# Patient Record
Sex: FEMALE | Race: OTHER | HISPANIC OR LATINO
[De-identification: names, ages, dates, MRNs, and addresses within clinical notes are randomized per-mention and may not be internally consistent; named-entity substitution may affect disease eponyms.]

---

## 2017-01-10 ENCOUNTER — APPOINTMENT (OUTPATIENT)
Dept: UROGYNECOLOGY | Facility: CLINIC | Age: 67
End: 2017-01-10

## 2017-01-10 VITALS
DIASTOLIC BLOOD PRESSURE: 70 MMHG | SYSTOLIC BLOOD PRESSURE: 120 MMHG | WEIGHT: 131 LBS | BODY MASS INDEX: 24.73 KG/M2 | HEIGHT: 61 IN

## 2017-01-10 LAB
BILIRUB UR QL STRIP: NEGATIVE
CLARITY UR: CLEAR
COLLECTION METHOD: NORMAL
GLUCOSE UR-MCNC: NEGATIVE
HCG UR QL: 0.2 EU/DL
HGB UR QL STRIP.AUTO: NORMAL
KETONES UR-MCNC: NEGATIVE
LEUKOCYTE ESTERASE UR QL STRIP: NEGATIVE
NITRITE UR QL STRIP: NEGATIVE
PH UR STRIP: 5.5
PROT UR STRIP-MCNC: NEGATIVE
SP GR UR STRIP: 1

## 2017-01-11 LAB
APPEARANCE: CLEAR
BACTERIA: NEGATIVE
BILIRUBIN URINE: NEGATIVE
BLOOD URINE: ABNORMAL
COLOR: YELLOW
GLUCOSE QUALITATIVE U: NORMAL MG/DL
KETONES URINE: NEGATIVE
LEUKOCYTE ESTERASE URINE: NEGATIVE
MICROSCOPIC-UA: NORMAL
NITRITE URINE: NEGATIVE
PH URINE: 6
PROTEIN URINE: NEGATIVE MG/DL
RED BLOOD CELLS URINE: 0 /HPF
SPECIFIC GRAVITY URINE: 1.01
SQUAMOUS EPITHELIAL CELLS: 0 /HPF
UROBILINOGEN URINE: NORMAL MG/DL
WHITE BLOOD CELLS URINE: 0 /HPF

## 2017-04-06 ENCOUNTER — RESULT CHARGE (OUTPATIENT)
Age: 67
End: 2017-04-06

## 2017-04-07 ENCOUNTER — LABORATORY RESULT (OUTPATIENT)
Age: 67
End: 2017-04-07

## 2017-04-07 ENCOUNTER — APPOINTMENT (OUTPATIENT)
Dept: INTERNAL MEDICINE | Facility: CLINIC | Age: 67
End: 2017-04-07

## 2017-04-07 ENCOUNTER — NON-APPOINTMENT (OUTPATIENT)
Age: 67
End: 2017-04-07

## 2017-04-07 ENCOUNTER — OUTPATIENT (OUTPATIENT)
Dept: OUTPATIENT SERVICES | Facility: HOSPITAL | Age: 67
LOS: 1 days | End: 2017-04-07
Payer: MEDICAID

## 2017-04-07 VITALS
DIASTOLIC BLOOD PRESSURE: 90 MMHG | HEART RATE: 76 BPM | SYSTOLIC BLOOD PRESSURE: 120 MMHG | OXYGEN SATURATION: 98 % | WEIGHT: 134 LBS | HEIGHT: 61 IN | BODY MASS INDEX: 25.3 KG/M2

## 2017-04-07 DIAGNOSIS — R00.2 PALPITATIONS: ICD-10-CM

## 2017-04-07 DIAGNOSIS — H26.9 UNSPECIFIED CATARACT: Chronic | ICD-10-CM

## 2017-04-07 DIAGNOSIS — N95.2 POSTMENOPAUSAL ATROPHIC VAGINITIS: ICD-10-CM

## 2017-04-07 DIAGNOSIS — I10 ESSENTIAL (PRIMARY) HYPERTENSION: ICD-10-CM

## 2017-04-07 DIAGNOSIS — L71.9 ROSACEA, UNSPECIFIED: ICD-10-CM

## 2017-04-07 DIAGNOSIS — H02.209 UNSPECIFIED LAGOPHTHALMOS UNSPECIFIED EYE, UNSPECIFIED EYELID: Chronic | ICD-10-CM

## 2017-04-07 LAB — TSH SERPL-MCNC: 0.96 UIU/ML — SIGNIFICANT CHANGE UP (ref 0.27–4.2)

## 2017-04-07 PROCEDURE — 86803 HEPATITIS C AB TEST: CPT

## 2017-04-07 PROCEDURE — 80053 COMPREHEN METABOLIC PANEL: CPT

## 2017-04-07 PROCEDURE — 83036 HEMOGLOBIN GLYCOSYLATED A1C: CPT

## 2017-04-07 PROCEDURE — G0463: CPT

## 2017-04-07 PROCEDURE — 84443 ASSAY THYROID STIM HORMONE: CPT

## 2017-04-08 LAB
ALBUMIN SERPL ELPH-MCNC: 4.4 G/DL — SIGNIFICANT CHANGE UP (ref 3.3–5)
ALP SERPL-CCNC: 90 U/L — SIGNIFICANT CHANGE UP (ref 40–120)
ALT FLD-CCNC: 9 U/L — LOW (ref 10–45)
ANION GAP SERPL CALC-SCNC: 14 MMOL/L — SIGNIFICANT CHANGE UP (ref 5–17)
AST SERPL-CCNC: 24 U/L — SIGNIFICANT CHANGE UP (ref 10–40)
BILIRUB SERPL-MCNC: 0.3 MG/DL — SIGNIFICANT CHANGE UP (ref 0.2–1.2)
BUN SERPL-MCNC: 15 MG/DL — SIGNIFICANT CHANGE UP (ref 7–23)
CALCIUM SERPL-MCNC: 9.8 MG/DL — SIGNIFICANT CHANGE UP (ref 8.4–10.5)
CHLORIDE SERPL-SCNC: 99 MMOL/L — SIGNIFICANT CHANGE UP (ref 96–108)
CO2 SERPL-SCNC: 28 MMOL/L — SIGNIFICANT CHANGE UP (ref 22–31)
CREAT SERPL-MCNC: 0.93 MG/DL — SIGNIFICANT CHANGE UP (ref 0.5–1.3)
GLUCOSE SERPL-MCNC: 91 MG/DL — SIGNIFICANT CHANGE UP (ref 70–99)
HBA1C BLD-MCNC: 5.9 % — HIGH (ref 4–5.6)
HCV AB S/CO SERPL IA: 0.1 S/CO — SIGNIFICANT CHANGE UP
HCV AB SERPL-IMP: SIGNIFICANT CHANGE UP
POTASSIUM SERPL-MCNC: 4.8 MMOL/L — SIGNIFICANT CHANGE UP (ref 3.5–5.3)
POTASSIUM SERPL-SCNC: 4.8 MMOL/L — SIGNIFICANT CHANGE UP (ref 3.5–5.3)
PROT SERPL-MCNC: 8.2 G/DL — SIGNIFICANT CHANGE UP (ref 6–8.3)
SODIUM SERPL-SCNC: 141 MMOL/L — SIGNIFICANT CHANGE UP (ref 135–145)

## 2017-04-12 ENCOUNTER — APPOINTMENT (OUTPATIENT)
Dept: INTERNAL MEDICINE | Facility: CLINIC | Age: 67
End: 2017-04-12

## 2017-04-17 DIAGNOSIS — R73.03 PREDIABETES: ICD-10-CM

## 2017-04-17 DIAGNOSIS — R00.2 PALPITATIONS: ICD-10-CM

## 2017-04-17 DIAGNOSIS — L71.9 ROSACEA, UNSPECIFIED: ICD-10-CM

## 2017-04-17 DIAGNOSIS — E87.6 HYPOKALEMIA: ICD-10-CM

## 2017-04-17 DIAGNOSIS — L53.9 ERYTHEMATOUS CONDITION, UNSPECIFIED: ICD-10-CM

## 2017-04-18 ENCOUNTER — APPOINTMENT (OUTPATIENT)
Dept: INTERNAL MEDICINE | Facility: CLINIC | Age: 67
End: 2017-04-18

## 2017-04-18 ENCOUNTER — NON-APPOINTMENT (OUTPATIENT)
Age: 67
End: 2017-04-18

## 2017-04-25 ENCOUNTER — FORM ENCOUNTER (OUTPATIENT)
Age: 67
End: 2017-04-25

## 2017-04-26 ENCOUNTER — APPOINTMENT (OUTPATIENT)
Dept: MAMMOGRAPHY | Facility: IMAGING CENTER | Age: 67
End: 2017-04-26

## 2017-04-26 ENCOUNTER — OUTPATIENT (OUTPATIENT)
Dept: OUTPATIENT SERVICES | Facility: HOSPITAL | Age: 67
LOS: 1 days | End: 2017-04-26
Payer: MEDICAID

## 2017-04-26 DIAGNOSIS — H26.9 UNSPECIFIED CATARACT: Chronic | ICD-10-CM

## 2017-04-26 DIAGNOSIS — Z00.8 ENCOUNTER FOR OTHER GENERAL EXAMINATION: ICD-10-CM

## 2017-04-26 DIAGNOSIS — H02.209 UNSPECIFIED LAGOPHTHALMOS UNSPECIFIED EYE, UNSPECIFIED EYELID: Chronic | ICD-10-CM

## 2017-04-26 PROCEDURE — 77067 SCR MAMMO BI INCL CAD: CPT

## 2017-04-26 PROCEDURE — 77063 BREAST TOMOSYNTHESIS BI: CPT

## 2017-05-02 ENCOUNTER — LABORATORY RESULT (OUTPATIENT)
Age: 67
End: 2017-05-02

## 2017-05-02 ENCOUNTER — APPOINTMENT (OUTPATIENT)
Dept: OBGYN | Facility: CLINIC | Age: 67
End: 2017-05-02

## 2017-05-02 ENCOUNTER — RESULT REVIEW (OUTPATIENT)
Age: 67
End: 2017-05-02

## 2017-05-02 ENCOUNTER — OUTPATIENT (OUTPATIENT)
Dept: OUTPATIENT SERVICES | Facility: HOSPITAL | Age: 67
LOS: 1 days | End: 2017-05-02
Payer: MEDICARE

## 2017-05-02 VITALS
BODY MASS INDEX: 24.4 KG/M2 | WEIGHT: 129.25 LBS | HEIGHT: 61 IN | DIASTOLIC BLOOD PRESSURE: 82 MMHG | SYSTOLIC BLOOD PRESSURE: 120 MMHG

## 2017-05-02 DIAGNOSIS — H26.9 UNSPECIFIED CATARACT: Chronic | ICD-10-CM

## 2017-05-02 DIAGNOSIS — H02.209 UNSPECIFIED LAGOPHTHALMOS UNSPECIFIED EYE, UNSPECIFIED EYELID: Chronic | ICD-10-CM

## 2017-05-02 DIAGNOSIS — N76.0 ACUTE VAGINITIS: ICD-10-CM

## 2017-05-02 DIAGNOSIS — N95.2 POSTMENOPAUSAL ATROPHIC VAGINITIS: ICD-10-CM

## 2017-05-02 PROCEDURE — 81003 URINALYSIS AUTO W/O SCOPE: CPT

## 2017-05-02 PROCEDURE — 88175 CYTOPATH C/V AUTO FLUID REDO: CPT

## 2017-05-02 PROCEDURE — 87086 URINE CULTURE/COLONY COUNT: CPT

## 2017-05-02 PROCEDURE — G0439: CPT

## 2017-05-02 PROCEDURE — 87624 HPV HI-RISK TYP POOLED RSLT: CPT

## 2017-05-03 ENCOUNTER — LABORATORY RESULT (OUTPATIENT)
Age: 67
End: 2017-05-03

## 2017-05-03 LAB
C TRACH RRNA SPEC QL NAA+PROBE: SIGNIFICANT CHANGE UP
HPV HIGH+LOW RISK DNA PNL CVX: SIGNIFICANT CHANGE UP
N GONORRHOEA RRNA SPEC QL NAA+PROBE: SIGNIFICANT CHANGE UP
SPECIMEN SOURCE: SIGNIFICANT CHANGE UP

## 2017-05-04 DIAGNOSIS — Z01.419 ENCOUNTER FOR GYNECOLOGICAL EXAMINATION (GENERAL) (ROUTINE) WITHOUT ABNORMAL FINDINGS: ICD-10-CM

## 2017-05-04 DIAGNOSIS — N95.2 POSTMENOPAUSAL ATROPHIC VAGINITIS: ICD-10-CM

## 2017-05-04 DIAGNOSIS — L29.0 PRURITUS ANI: ICD-10-CM

## 2017-05-04 LAB
CULTURE RESULTS: SIGNIFICANT CHANGE UP
CYTOLOGY SPEC DOC CYTO: SIGNIFICANT CHANGE UP
SPECIMEN SOURCE: SIGNIFICANT CHANGE UP

## 2017-05-12 ENCOUNTER — APPOINTMENT (OUTPATIENT)
Dept: OPHTHALMOLOGY | Facility: CLINIC | Age: 67
End: 2017-05-12

## 2017-06-02 ENCOUNTER — LABORATORY RESULT (OUTPATIENT)
Age: 67
End: 2017-06-02

## 2017-06-02 ENCOUNTER — APPOINTMENT (OUTPATIENT)
Dept: PEDIATRIC ALLERGY IMMUNOLOGY | Facility: CLINIC | Age: 67
End: 2017-06-02

## 2017-06-02 ENCOUNTER — OUTPATIENT (OUTPATIENT)
Dept: OUTPATIENT SERVICES | Facility: HOSPITAL | Age: 67
LOS: 1 days | End: 2017-06-02
Payer: MEDICARE

## 2017-06-02 VITALS
WEIGHT: 128.99 LBS | HEART RATE: 70 BPM | DIASTOLIC BLOOD PRESSURE: 82 MMHG | OXYGEN SATURATION: 98 % | SYSTOLIC BLOOD PRESSURE: 129 MMHG | HEIGHT: 60.98 IN | BODY MASS INDEX: 24.35 KG/M2

## 2017-06-02 DIAGNOSIS — Z91.041 RADIOGRAPHIC DYE ALLERGY STATUS: ICD-10-CM

## 2017-06-02 DIAGNOSIS — H26.9 UNSPECIFIED CATARACT: Chronic | ICD-10-CM

## 2017-06-02 DIAGNOSIS — J30.9 ALLERGIC RHINITIS, UNSPECIFIED: ICD-10-CM

## 2017-06-02 DIAGNOSIS — H02.209 UNSPECIFIED LAGOPHTHALMOS UNSPECIFIED EYE, UNSPECIFIED EYELID: Chronic | ICD-10-CM

## 2017-06-02 DIAGNOSIS — Z91.040 LATEX ALLERGY STATUS: ICD-10-CM

## 2017-06-02 PROCEDURE — 36415 COLL VENOUS BLD VENIPUNCTURE: CPT

## 2017-06-02 PROCEDURE — G0463: CPT | Mod: 25

## 2017-06-02 PROCEDURE — 95004 PERQ TESTS W/ALRGNC XTRCS: CPT

## 2017-06-02 RX ORDER — NYSTATIN AND TRIAMCINOLONE ACETONIDE 100000; 1 [USP'U]/G; MG/G
100000-0.1 OINTMENT TOPICAL 3 TIMES DAILY
Qty: 1 | Refills: 0 | Status: DISCONTINUED | COMMUNITY
Start: 2017-05-02 | End: 2017-06-02

## 2017-06-07 LAB
5OH-INDOLEACETATE 24H UR-MRATE: 0.2 MG/24 H
5OH-INDOLEACETATE UR-MCNC: 0.04 G/24 H
ALBUMIN SERPL ELPH-MCNC: 4.3 G/DL
ALP BLD-CCNC: 90 U/L
ALT SERPL-CCNC: 12 U/L
ANION GAP SERPL CALC-SCNC: 14 MMOL/L
AST SERPL-CCNC: 20 U/L
BASOPHILS # BLD AUTO: 0.03 K/UL
BASOPHILS NFR BLD AUTO: 0.5 %
BILIRUB SERPL-MCNC: 0.3 MG/DL
BUN SERPL-MCNC: 12 MG/DL
CALCIUM SERPL-MCNC: 9.5 MG/DL
CHLORIDE SERPL-SCNC: 102 MMOL/L
CO2 SERPL-SCNC: 23 MMOL/L
CREAT ?TM UR-MCNC: 40.5 MG/DL
CREAT SERPL-MCNC: 0.71 MG/DL
EOSINOPHIL # BLD AUTO: 0.12 K/UL
EOSINOPHIL NFR BLD AUTO: 1.9 %
GLUCOSE SERPL-MCNC: 90 MG/DL
HCT VFR BLD CALC: 38.6 %
HGB BLD-MCNC: 12 G/DL
IMM GRANULOCYTES NFR BLD AUTO: 0.2 %
LYMPHOCYTES # BLD AUTO: 2.01 K/UL
LYMPHOCYTES NFR BLD AUTO: 31.6 %
MAN DIFF?: NORMAL
MCHC RBC-ENTMCNC: 29.5 PG
MCHC RBC-ENTMCNC: 31.1 GM/DL
MCV RBC AUTO: 94.8 FL
MONOCYTES # BLD AUTO: 0.36 K/UL
MONOCYTES NFR BLD AUTO: 5.7 %
NEUTROPHILS # BLD AUTO: 3.83 K/UL
NEUTROPHILS NFR BLD AUTO: 60.1 %
PLATELET # BLD AUTO: 298 K/UL
POTASSIUM SERPL-SCNC: 4.4 MMOL/L
PROT SERPL-MCNC: 7.8 G/DL
RBC # BLD: 4.07 M/UL
RBC # FLD: 14 %
SODIUM SERPL-SCNC: 139 MMOL/L
SPECIMEN VOL 24H UR: 99 ML
TRYPTASE: 4.7 NG/ML
VMA/G CREATININE: 4.9 MG/G CREAT
WBC # FLD AUTO: 6.36 K/UL

## 2017-06-08 DIAGNOSIS — Z91.040 LATEX ALLERGY STATUS: ICD-10-CM

## 2017-06-08 DIAGNOSIS — R23.2 FLUSHING: ICD-10-CM

## 2017-06-08 DIAGNOSIS — Z01.89 ENCOUNTER FOR OTHER SPECIFIED SPECIAL EXAMINATIONS: ICD-10-CM

## 2017-06-08 DIAGNOSIS — T78.1XXA OTHER ADVERSE FOOD REACTIONS, NOT ELSEWHERE CLASSIFIED, INITIAL ENCOUNTER: ICD-10-CM

## 2017-06-08 DIAGNOSIS — H10.13 ACUTE ATOPIC CONJUNCTIVITIS, BILATERAL: ICD-10-CM

## 2017-06-08 DIAGNOSIS — Z91.013 ALLERGY TO SEAFOOD: ICD-10-CM

## 2017-06-09 ENCOUNTER — LABORATORY RESULT (OUTPATIENT)
Age: 67
End: 2017-06-09

## 2017-06-16 LAB
5OH-INDOLEACETATE 24H UR-MRATE: 3.9 MG/24 H
5OH-INDOLEACETATE UR-MCNC: 1.22 G/24 H
BLUE MUSSEL IGE QN: <0.1 KUA/L
CLAM IGE QN: <0.1 KUA/L
CODFISH IGE QN: <0.1 KUA/L
CRAB IGE QN: <0.1 KUA/L
DEPRECATED BLUE MUSSEL IGE RAST QL: 0
DEPRECATED CLAM IGE RAST QL: 0
DEPRECATED CODFISH IGE RAST QL: 0
DEPRECATED CRAB IGE RAST QL: 0
DEPRECATED FLOUNDER IGE RAST QL: 0
DEPRECATED HALIBUT IGE RAST QL: 0
DEPRECATED LOBSTER IGE RAST QL: 0
DEPRECATED LTX IGE RAST QL: 0
DEPRECATED OYSTER IGE RAST QL: 0
DEPRECATED SALMON IGE RAST QL: 0
DEPRECATED SCALLOP IGE RAST QL: <0.1 KUA/L
DEPRECATED SHRIMP IGE RAST QL: 0
DEPRECATED TILAPIA IGE RAST QL: 0
DEPRECATED TUNA IGE RAST QL: 0
DOPAMINE UR-MCNC: 79 UG/L
DOPAMINE, UR, 24HR: 221 UG/24 HR
EPINEPH UR-MCNC: 1 UG/L
EPINEPHRINE, U, 24HR: 3 UG/24 HR
FLOUNDER IGE QN: <0.1 KUA/L
HALIBUT IGE QN: <0.1 KUA/L
HISTAMINE 24H UR-MRATE: 0.02 MG/24 H
HISTAMINE UR-MCNC: 1.26 G/24 H
LOBSTER IGE QN: <0.1 KUA/L
LTX IGE QN: <0.1 KUA/L
MISCELLANEOUS TEST: NORMAL
NOREPINEPH UR-MCNC: 20 UG/L
NOREPINEPHRINE,U,24H: 56 UG/24 HR
OYSTER IGE QN: <0.1 KUA/L
PROC NAME: NORMAL
SALMON IGE QN: <0.1 KUA/L
SCALLOP IGE QN: 0
SCALLOP IGE QN: <0.1 KUA/L
SPECIMEN VOL 24H UR: 2800 ML
SPECIMEN VOL 24H UR: 2800 ML
TILAPIA IGE QN: <0.1 KUA/L
TUNA IGE QN: <0.1 KUA/L

## 2017-06-23 ENCOUNTER — APPOINTMENT (OUTPATIENT)
Dept: OPHTHALMOLOGY | Facility: CLINIC | Age: 67
End: 2017-06-23

## 2017-06-23 DIAGNOSIS — H35.3190 NONEXUDATIVE AGE-RELATED MACULAR DEGENERATION, UNSPECIFIED EYE, STAGE UNSPECIFIED: ICD-10-CM

## 2017-06-23 LAB
MISCELLANEOUS TEST: NORMAL
MISCELLANEOUS TEST: NORMAL
PROC NAME: NORMAL
PROC NAME: NORMAL

## 2017-06-29 ENCOUNTER — APPOINTMENT (OUTPATIENT)
Dept: ORTHOPEDIC SURGERY | Facility: CLINIC | Age: 67
End: 2017-06-29

## 2017-06-29 DIAGNOSIS — M25.562 PAIN IN LEFT KNEE: ICD-10-CM

## 2017-07-05 ENCOUNTER — OUTPATIENT (OUTPATIENT)
Dept: OUTPATIENT SERVICES | Facility: HOSPITAL | Age: 67
LOS: 1 days | End: 2017-07-05
Payer: MEDICARE

## 2017-07-05 ENCOUNTER — APPOINTMENT (OUTPATIENT)
Dept: ULTRASOUND IMAGING | Facility: CLINIC | Age: 67
End: 2017-07-05

## 2017-07-05 DIAGNOSIS — H02.209 UNSPECIFIED LAGOPHTHALMOS UNSPECIFIED EYE, UNSPECIFIED EYELID: Chronic | ICD-10-CM

## 2017-07-05 DIAGNOSIS — H26.9 UNSPECIFIED CATARACT: Chronic | ICD-10-CM

## 2017-07-05 DIAGNOSIS — Z00.8 ENCOUNTER FOR OTHER GENERAL EXAMINATION: ICD-10-CM

## 2017-07-05 PROCEDURE — 20611 DRAIN/INJ JOINT/BURSA W/US: CPT

## 2017-07-28 ENCOUNTER — OUTPATIENT (OUTPATIENT)
Dept: OUTPATIENT SERVICES | Facility: HOSPITAL | Age: 67
LOS: 1 days | End: 2017-07-28
Payer: MEDICARE

## 2017-07-28 ENCOUNTER — APPOINTMENT (OUTPATIENT)
Dept: PEDIATRIC ALLERGY IMMUNOLOGY | Facility: CLINIC | Age: 67
End: 2017-07-28

## 2017-07-28 VITALS
DIASTOLIC BLOOD PRESSURE: 76 MMHG | HEIGHT: 60.98 IN | BODY MASS INDEX: 24.54 KG/M2 | WEIGHT: 129.98 LBS | OXYGEN SATURATION: 98 % | SYSTOLIC BLOOD PRESSURE: 124 MMHG | HEART RATE: 74 BPM

## 2017-07-28 DIAGNOSIS — H26.9 UNSPECIFIED CATARACT: Chronic | ICD-10-CM

## 2017-07-28 DIAGNOSIS — H02.209 UNSPECIFIED LAGOPHTHALMOS UNSPECIFIED EYE, UNSPECIFIED EYELID: Chronic | ICD-10-CM

## 2017-07-28 PROCEDURE — G0463: CPT

## 2017-07-28 PROCEDURE — 99211 OFF/OP EST MAY X REQ PHY/QHP: CPT | Mod: NC

## 2017-07-28 RX ORDER — FEXOFENADINE HYDROCHLORIDE 180 MG/1
180 TABLET ORAL
Qty: 1 | Refills: 3 | Status: DISCONTINUED | COMMUNITY
Start: 2017-06-02 | End: 2017-07-28

## 2017-08-03 DIAGNOSIS — Z91.013 ALLERGY TO SEAFOOD: ICD-10-CM

## 2017-08-04 ENCOUNTER — OUTPATIENT (OUTPATIENT)
Dept: OUTPATIENT SERVICES | Facility: HOSPITAL | Age: 67
LOS: 1 days | End: 2017-08-04
Payer: MEDICARE

## 2017-08-04 ENCOUNTER — APPOINTMENT (OUTPATIENT)
Dept: PEDIATRIC ALLERGY IMMUNOLOGY | Facility: CLINIC | Age: 67
End: 2017-08-04
Payer: MEDICARE

## 2017-08-04 VITALS
SYSTOLIC BLOOD PRESSURE: 120 MMHG | DIASTOLIC BLOOD PRESSURE: 78 MMHG | HEART RATE: 74 BPM | BODY MASS INDEX: 24.54 KG/M2 | WEIGHT: 129.98 LBS | OXYGEN SATURATION: 98 % | HEIGHT: 60.98 IN

## 2017-08-04 DIAGNOSIS — R23.2 FLUSHING: ICD-10-CM

## 2017-08-04 DIAGNOSIS — H26.9 UNSPECIFIED CATARACT: Chronic | ICD-10-CM

## 2017-08-04 DIAGNOSIS — J30.9 ALLERGIC RHINITIS, UNSPECIFIED: ICD-10-CM

## 2017-08-04 DIAGNOSIS — H02.209 UNSPECIFIED LAGOPHTHALMOS UNSPECIFIED EYE, UNSPECIFIED EYELID: Chronic | ICD-10-CM

## 2017-08-04 PROCEDURE — 95076 INGEST CHALLENGE INI 120 MIN: CPT | Mod: GC,NC

## 2017-08-04 PROCEDURE — 95079 INGEST CHALLENGE ADDL 60 MIN: CPT

## 2017-08-04 PROCEDURE — 95079 INGEST CHALLENGE ADDL 60 MIN: CPT | Mod: GC,NC

## 2017-08-04 PROCEDURE — 95076 INGEST CHALLENGE INI 120 MIN: CPT

## 2017-08-07 DIAGNOSIS — T78.1XXD OTHER ADVERSE FOOD REACTIONS, NOT ELSEWHERE CLASSIFIED, SUBSEQUENT ENCOUNTER: ICD-10-CM

## 2017-08-07 DIAGNOSIS — Z91.013 ALLERGY TO SEAFOOD: ICD-10-CM

## 2017-08-25 ENCOUNTER — APPOINTMENT (OUTPATIENT)
Dept: PEDIATRIC ALLERGY IMMUNOLOGY | Facility: CLINIC | Age: 67
End: 2017-08-25
Payer: MEDICARE

## 2017-08-25 ENCOUNTER — OUTPATIENT (OUTPATIENT)
Dept: OUTPATIENT SERVICES | Facility: HOSPITAL | Age: 67
LOS: 1 days | End: 2017-08-25

## 2017-08-25 VITALS
WEIGHT: 131.99 LBS | HEIGHT: 60.98 IN | HEART RATE: 70 BPM | DIASTOLIC BLOOD PRESSURE: 85 MMHG | BODY MASS INDEX: 24.92 KG/M2 | SYSTOLIC BLOOD PRESSURE: 136 MMHG | OXYGEN SATURATION: 97 %

## 2017-08-25 DIAGNOSIS — H26.9 UNSPECIFIED CATARACT: Chronic | ICD-10-CM

## 2017-08-25 DIAGNOSIS — T78.1XXA OTHER ADVERSE FOOD REACTIONS, NOT ELSEWHERE CLASSIFIED, INITIAL ENCOUNTER: ICD-10-CM

## 2017-08-25 DIAGNOSIS — H02.209 UNSPECIFIED LAGOPHTHALMOS UNSPECIFIED EYE, UNSPECIFIED EYELID: Chronic | ICD-10-CM

## 2017-08-25 DIAGNOSIS — J30.9 ALLERGIC RHINITIS, UNSPECIFIED: ICD-10-CM

## 2017-08-25 PROCEDURE — 95079 INGEST CHALLENGE ADDL 60 MIN: CPT | Mod: GC

## 2017-08-25 PROCEDURE — 95076 INGEST CHALLENGE INI 120 MIN: CPT | Mod: GC

## 2017-09-11 ENCOUNTER — APPOINTMENT (OUTPATIENT)
Dept: OTOLARYNGOLOGY | Facility: CLINIC | Age: 67
End: 2017-09-11

## 2017-09-15 ENCOUNTER — APPOINTMENT (OUTPATIENT)
Dept: PEDIATRIC ALLERGY IMMUNOLOGY | Facility: CLINIC | Age: 67
End: 2017-09-15
Payer: MEDICARE

## 2017-09-15 ENCOUNTER — OUTPATIENT (OUTPATIENT)
Dept: OUTPATIENT SERVICES | Facility: HOSPITAL | Age: 67
LOS: 1 days | End: 2017-09-15
Payer: MEDICARE

## 2017-09-15 VITALS
WEIGHT: 131 LBS | DIASTOLIC BLOOD PRESSURE: 83 MMHG | SYSTOLIC BLOOD PRESSURE: 137 MMHG | BODY MASS INDEX: 24.73 KG/M2 | HEIGHT: 60.98 IN | OXYGEN SATURATION: 98 % | HEART RATE: 67 BPM

## 2017-09-15 VITALS — DIASTOLIC BLOOD PRESSURE: 83 MMHG | HEART RATE: 69 BPM | OXYGEN SATURATION: 98 % | SYSTOLIC BLOOD PRESSURE: 151 MMHG

## 2017-09-15 VITALS — SYSTOLIC BLOOD PRESSURE: 141 MMHG | HEART RATE: 63 BPM | DIASTOLIC BLOOD PRESSURE: 81 MMHG | OXYGEN SATURATION: 98 %

## 2017-09-15 VITALS — OXYGEN SATURATION: 98 % | SYSTOLIC BLOOD PRESSURE: 147 MMHG | HEART RATE: 64 BPM | DIASTOLIC BLOOD PRESSURE: 86 MMHG

## 2017-09-15 DIAGNOSIS — H26.9 UNSPECIFIED CATARACT: Chronic | ICD-10-CM

## 2017-09-15 DIAGNOSIS — Z91.013 ALLERGY TO SEAFOOD: ICD-10-CM

## 2017-09-15 DIAGNOSIS — H02.209 UNSPECIFIED LAGOPHTHALMOS UNSPECIFIED EYE, UNSPECIFIED EYELID: Chronic | ICD-10-CM

## 2017-09-15 DIAGNOSIS — J30.9 ALLERGIC RHINITIS, UNSPECIFIED: ICD-10-CM

## 2017-09-15 PROCEDURE — 95079 INGEST CHALLENGE ADDL 60 MIN: CPT | Mod: GC,NC

## 2017-09-15 PROCEDURE — 95076 INGEST CHALLENGE INI 120 MIN: CPT

## 2017-09-15 PROCEDURE — 95076 INGEST CHALLENGE INI 120 MIN: CPT | Mod: GC,NC

## 2017-09-15 PROCEDURE — 95079 INGEST CHALLENGE ADDL 60 MIN: CPT

## 2017-09-18 DIAGNOSIS — T78.1XXD OTHER ADVERSE FOOD REACTIONS, NOT ELSEWHERE CLASSIFIED, SUBSEQUENT ENCOUNTER: ICD-10-CM

## 2017-09-18 DIAGNOSIS — Z91.013 ALLERGY TO SEAFOOD: ICD-10-CM

## 2017-09-27 ENCOUNTER — APPOINTMENT (OUTPATIENT)
Dept: ORTHOPEDIC SURGERY | Facility: CLINIC | Age: 67
End: 2017-09-27
Payer: MEDICARE

## 2017-09-27 PROCEDURE — 99214 OFFICE O/P EST MOD 30 MIN: CPT

## 2017-09-27 PROCEDURE — 73564 X-RAY EXAM KNEE 4 OR MORE: CPT | Mod: LT

## 2017-09-29 ENCOUNTER — APPOINTMENT (OUTPATIENT)
Dept: MRI IMAGING | Facility: CLINIC | Age: 67
End: 2017-09-29
Payer: MEDICARE

## 2017-09-29 ENCOUNTER — OUTPATIENT (OUTPATIENT)
Dept: OUTPATIENT SERVICES | Facility: HOSPITAL | Age: 67
LOS: 1 days | End: 2017-09-29
Payer: MEDICARE

## 2017-09-29 DIAGNOSIS — H02.209 UNSPECIFIED LAGOPHTHALMOS UNSPECIFIED EYE, UNSPECIFIED EYELID: Chronic | ICD-10-CM

## 2017-09-29 DIAGNOSIS — H26.9 UNSPECIFIED CATARACT: Chronic | ICD-10-CM

## 2017-09-29 DIAGNOSIS — Z00.8 ENCOUNTER FOR OTHER GENERAL EXAMINATION: ICD-10-CM

## 2017-09-29 PROCEDURE — 73721 MRI JNT OF LWR EXTRE W/O DYE: CPT | Mod: 26,LT

## 2017-09-29 PROCEDURE — 73721 MRI JNT OF LWR EXTRE W/O DYE: CPT

## 2017-10-11 ENCOUNTER — APPOINTMENT (OUTPATIENT)
Dept: ORTHOPEDIC SURGERY | Facility: CLINIC | Age: 67
End: 2017-10-11
Payer: MEDICARE

## 2017-10-11 DIAGNOSIS — M23.204 DERANGEMENT OF UNSPECIFIED MEDIAL MENISCUS DUE TO OLD TEAR OR INJURY, LEFT KNEE: ICD-10-CM

## 2017-10-11 DIAGNOSIS — M17.12 UNILATERAL PRIMARY OSTEOARTHRITIS, LEFT KNEE: ICD-10-CM

## 2017-10-11 PROCEDURE — 20610 DRAIN/INJ JOINT/BURSA W/O US: CPT | Mod: LT

## 2017-10-11 PROCEDURE — 99214 OFFICE O/P EST MOD 30 MIN: CPT | Mod: 25

## 2017-10-13 PROBLEM — M23.204 DEGENERATIVE TEAR OF MEDIAL MENISCUS OF LEFT KNEE: Status: ACTIVE | Noted: 2017-10-13

## 2017-10-13 PROBLEM — M17.12 PRIMARY OSTEOARTHRITIS OF LEFT KNEE: Status: ACTIVE | Noted: 2017-10-13

## 2017-11-10 ENCOUNTER — APPOINTMENT (OUTPATIENT)
Dept: INTERNAL MEDICINE | Facility: CLINIC | Age: 67
End: 2017-11-10
Payer: MEDICARE

## 2017-11-10 ENCOUNTER — OUTPATIENT (OUTPATIENT)
Dept: OUTPATIENT SERVICES | Facility: HOSPITAL | Age: 67
LOS: 1 days | End: 2017-11-10
Payer: MEDICARE

## 2017-11-10 VITALS
RESPIRATION RATE: 12 BRPM | HEART RATE: 77 BPM | SYSTOLIC BLOOD PRESSURE: 140 MMHG | DIASTOLIC BLOOD PRESSURE: 80 MMHG | TEMPERATURE: 98.8 F

## 2017-11-10 DIAGNOSIS — H02.209 UNSPECIFIED LAGOPHTHALMOS UNSPECIFIED EYE, UNSPECIFIED EYELID: Chronic | ICD-10-CM

## 2017-11-10 DIAGNOSIS — H26.9 UNSPECIFIED CATARACT: Chronic | ICD-10-CM

## 2017-11-10 DIAGNOSIS — I10 ESSENTIAL (PRIMARY) HYPERTENSION: ICD-10-CM

## 2017-11-10 PROCEDURE — 99213 OFFICE O/P EST LOW 20 MIN: CPT | Mod: GE

## 2017-11-10 PROCEDURE — G0463: CPT

## 2017-11-20 DIAGNOSIS — J06.9 ACUTE UPPER RESPIRATORY INFECTION, UNSPECIFIED: ICD-10-CM

## 2017-11-20 DIAGNOSIS — B97.89 OTHER VIRAL AGENTS AS THE CAUSE OF DISEASES CLASSIFIED ELSEWHERE: ICD-10-CM

## 2017-12-19 ENCOUNTER — APPOINTMENT (OUTPATIENT)
Dept: OPHTHALMOLOGY | Facility: CLINIC | Age: 67
End: 2017-12-19
Payer: MEDICARE

## 2017-12-19 PROCEDURE — 92083 EXTENDED VISUAL FIELD XM: CPT

## 2017-12-19 PROCEDURE — 92012 INTRM OPH EXAM EST PATIENT: CPT

## 2018-02-16 ENCOUNTER — OUTPATIENT (OUTPATIENT)
Dept: OUTPATIENT SERVICES | Facility: HOSPITAL | Age: 68
LOS: 1 days | End: 2018-02-16
Payer: MEDICARE

## 2018-02-16 ENCOUNTER — LABORATORY RESULT (OUTPATIENT)
Age: 68
End: 2018-02-16

## 2018-02-16 ENCOUNTER — APPOINTMENT (OUTPATIENT)
Dept: INTERNAL MEDICINE | Facility: CLINIC | Age: 68
End: 2018-02-16
Payer: MEDICARE

## 2018-02-16 VITALS
BODY MASS INDEX: 24.73 KG/M2 | SYSTOLIC BLOOD PRESSURE: 131 MMHG | HEART RATE: 77 BPM | DIASTOLIC BLOOD PRESSURE: 70 MMHG | WEIGHT: 131 LBS | HEIGHT: 60.98 IN | OXYGEN SATURATION: 98 %

## 2018-02-16 DIAGNOSIS — H26.9 UNSPECIFIED CATARACT: Chronic | ICD-10-CM

## 2018-02-16 DIAGNOSIS — H04.129 DRY EYE SYNDROME OF UNSPECIFIED LACRIMAL GLAND: ICD-10-CM

## 2018-02-16 DIAGNOSIS — R19.8 OTHER SPECIFIED SYMPTOMS AND SIGNS INVOLVING THE DIGESTIVE SYSTEM AND ABDOMEN: ICD-10-CM

## 2018-02-16 DIAGNOSIS — H10.13 ACUTE ATOPIC CONJUNCTIVITIS, BILATERAL: ICD-10-CM

## 2018-02-16 DIAGNOSIS — R31.29 OTHER MICROSCOPIC HEMATURIA: ICD-10-CM

## 2018-02-16 DIAGNOSIS — M79.651 PAIN IN RIGHT THIGH: ICD-10-CM

## 2018-02-16 DIAGNOSIS — Z86.39 PERSONAL HISTORY OF OTHER ENDOCRINE, NUTRITIONAL AND METABOLIC DISEASE: ICD-10-CM

## 2018-02-16 DIAGNOSIS — M25.561 PAIN IN RIGHT KNEE: ICD-10-CM

## 2018-02-16 DIAGNOSIS — H61.23 IMPACTED CERUMEN, BILATERAL: ICD-10-CM

## 2018-02-16 DIAGNOSIS — R76.11 NONSPECIFIC REACTION TO TUBERCULIN SKIN TEST W/OUT ACTIVE TUBERCULOSIS: ICD-10-CM

## 2018-02-16 DIAGNOSIS — Z01.419 ENCOUNTER FOR GYNECOLOGICAL EXAMINATION (GENERAL) (ROUTINE) W/OUT ABNORMAL FINDINGS: ICD-10-CM

## 2018-02-16 DIAGNOSIS — I10 ESSENTIAL (PRIMARY) HYPERTENSION: ICD-10-CM

## 2018-02-16 DIAGNOSIS — R10.32 LEFT LOWER QUADRANT PAIN: ICD-10-CM

## 2018-02-16 DIAGNOSIS — M79.652 PAIN IN RIGHT THIGH: ICD-10-CM

## 2018-02-16 DIAGNOSIS — M54.9 DORSALGIA, UNSPECIFIED: ICD-10-CM

## 2018-02-16 DIAGNOSIS — R76.11 NONSPECIFIC REACTION TO TUBERCULIN SKIN TEST WITHOUT ACTIVE TUBERCULOSIS: ICD-10-CM

## 2018-02-16 DIAGNOSIS — R68.83 CHILLS (WITHOUT FEVER): ICD-10-CM

## 2018-02-16 DIAGNOSIS — L29.0 PRURITUS ANI: ICD-10-CM

## 2018-02-16 DIAGNOSIS — R51 HEADACHE: ICD-10-CM

## 2018-02-16 DIAGNOSIS — L30.1 DYSHIDROSIS [POMPHOLYX]: ICD-10-CM

## 2018-02-16 DIAGNOSIS — M23.203 DERANGEMENT OF UNSPECIFIED MEDIAL MENISCUS DUE TO OLD TEAR OR INJURY, RIGHT KNEE: ICD-10-CM

## 2018-02-16 DIAGNOSIS — Z01.89 ENCOUNTER FOR OTHER SPECIFIED SPECIAL EXAMINATIONS: ICD-10-CM

## 2018-02-16 DIAGNOSIS — H02.209 UNSPECIFIED LAGOPHTHALMOS UNSPECIFIED EYE, UNSPECIFIED EYELID: Chronic | ICD-10-CM

## 2018-02-16 DIAGNOSIS — L53.9 ERYTHEMATOUS CONDITION, UNSPECIFIED: ICD-10-CM

## 2018-02-16 LAB
HCT VFR BLD CALC: 40.1 % — SIGNIFICANT CHANGE UP (ref 34.5–45)
HGB BLD-MCNC: 12.7 G/DL — SIGNIFICANT CHANGE UP (ref 11.5–15.5)
MCHC RBC-ENTMCNC: 30.2 PG — SIGNIFICANT CHANGE UP (ref 27–34)
MCHC RBC-ENTMCNC: 31.7 GM/DL — LOW (ref 32–36)
MCV RBC AUTO: 95.5 FL — SIGNIFICANT CHANGE UP (ref 80–100)
PLATELET # BLD AUTO: 296 K/UL — SIGNIFICANT CHANGE UP (ref 150–400)
RBC # BLD: 4.2 M/UL — SIGNIFICANT CHANGE UP (ref 3.8–5.2)
RBC # FLD: 14.4 % — SIGNIFICANT CHANGE UP (ref 10.3–14.5)
WBC # BLD: 7.1 K/UL — SIGNIFICANT CHANGE UP (ref 3.8–10.5)
WBC # FLD AUTO: 7.1 K/UL — SIGNIFICANT CHANGE UP (ref 3.8–10.5)

## 2018-02-16 PROCEDURE — 88112 CYTOPATH CELL ENHANCE TECH: CPT

## 2018-02-16 PROCEDURE — 80053 COMPREHEN METABOLIC PANEL: CPT

## 2018-02-16 PROCEDURE — 86376 MICROSOMAL ANTIBODY EACH: CPT

## 2018-02-16 PROCEDURE — 99213 OFFICE O/P EST LOW 20 MIN: CPT | Mod: GE

## 2018-02-16 PROCEDURE — G0463: CPT

## 2018-02-16 PROCEDURE — 84443 ASSAY THYROID STIM HORMONE: CPT

## 2018-02-16 PROCEDURE — 85027 COMPLETE CBC AUTOMATED: CPT

## 2018-02-16 PROCEDURE — 88112 CYTOPATH CELL ENHANCE TECH: CPT | Mod: 26

## 2018-02-16 PROCEDURE — 81001 URINALYSIS AUTO W/SCOPE: CPT

## 2018-02-16 PROCEDURE — 83036 HEMOGLOBIN GLYCOSYLATED A1C: CPT

## 2018-02-17 LAB
ALBUMIN SERPL ELPH-MCNC: 4.5 G/DL — SIGNIFICANT CHANGE UP (ref 3.3–5)
ALP SERPL-CCNC: 96 U/L — SIGNIFICANT CHANGE UP (ref 40–120)
ALT FLD-CCNC: 16 U/L — SIGNIFICANT CHANGE UP (ref 10–45)
ANION GAP SERPL CALC-SCNC: 13 MMOL/L — SIGNIFICANT CHANGE UP (ref 5–17)
AST SERPL-CCNC: 26 U/L — SIGNIFICANT CHANGE UP (ref 10–40)
BILIRUB SERPL-MCNC: 0.2 MG/DL — SIGNIFICANT CHANGE UP (ref 0.2–1.2)
BUN SERPL-MCNC: 16 MG/DL — SIGNIFICANT CHANGE UP (ref 7–23)
CALCIUM SERPL-MCNC: 9.9 MG/DL — SIGNIFICANT CHANGE UP (ref 8.4–10.5)
CHLORIDE SERPL-SCNC: 99 MMOL/L — SIGNIFICANT CHANGE UP (ref 96–108)
CO2 SERPL-SCNC: 27 MMOL/L — SIGNIFICANT CHANGE UP (ref 22–31)
CREAT SERPL-MCNC: 0.72 MG/DL — SIGNIFICANT CHANGE UP (ref 0.5–1.3)
GLUCOSE SERPL-MCNC: 101 MG/DL — HIGH (ref 70–99)
HBA1C BLD-MCNC: 5.7 % — HIGH (ref 4–5.6)
POTASSIUM SERPL-MCNC: 5.1 MMOL/L — SIGNIFICANT CHANGE UP (ref 3.5–5.3)
POTASSIUM SERPL-SCNC: 5.1 MMOL/L — SIGNIFICANT CHANGE UP (ref 3.5–5.3)
PROT SERPL-MCNC: 8.6 G/DL — HIGH (ref 6–8.3)
SODIUM SERPL-SCNC: 139 MMOL/L — SIGNIFICANT CHANGE UP (ref 135–145)
T4 FREE+ TSH PNL SERPL: 0.83 UIU/ML — SIGNIFICANT CHANGE UP (ref 0.27–4.2)
THYROPEROXIDASE AB SERPL-ACNC: <10 IU/ML — SIGNIFICANT CHANGE UP (ref 0–34.9)

## 2018-02-22 LAB — NON-GYNECOLOGICAL CYTOLOGY STUDY: SIGNIFICANT CHANGE UP

## 2018-02-26 ENCOUNTER — APPOINTMENT (OUTPATIENT)
Dept: RADIOLOGY | Facility: CLINIC | Age: 68
End: 2018-02-26
Payer: MEDICARE

## 2018-02-26 ENCOUNTER — OUTPATIENT (OUTPATIENT)
Dept: OUTPATIENT SERVICES | Facility: HOSPITAL | Age: 68
LOS: 1 days | End: 2018-02-26
Payer: MEDICARE

## 2018-02-26 ENCOUNTER — APPOINTMENT (OUTPATIENT)
Dept: ULTRASOUND IMAGING | Facility: CLINIC | Age: 68
End: 2018-02-26
Payer: MEDICARE

## 2018-02-26 DIAGNOSIS — E03.9 HYPOTHYROIDISM, UNSPECIFIED: ICD-10-CM

## 2018-02-26 DIAGNOSIS — R31.29 OTHER MICROSCOPIC HEMATURIA: ICD-10-CM

## 2018-02-26 DIAGNOSIS — H26.9 UNSPECIFIED CATARACT: Chronic | ICD-10-CM

## 2018-02-26 DIAGNOSIS — H02.209 UNSPECIFIED LAGOPHTHALMOS UNSPECIFIED EYE, UNSPECIFIED EYELID: Chronic | ICD-10-CM

## 2018-02-26 DIAGNOSIS — R76.11 NONSPECIFIC REACTION TO TUBERCULIN SKIN TEST WITHOUT ACTIVE TUBERCULOSIS: ICD-10-CM

## 2018-02-26 DIAGNOSIS — M54.9 DORSALGIA, UNSPECIFIED: ICD-10-CM

## 2018-02-26 PROCEDURE — 71046 X-RAY EXAM CHEST 2 VIEWS: CPT | Mod: 26

## 2018-02-26 PROCEDURE — 72070 X-RAY EXAM THORAC SPINE 2VWS: CPT | Mod: 26

## 2018-02-26 PROCEDURE — 72040 X-RAY EXAM NECK SPINE 2-3 VW: CPT

## 2018-02-26 PROCEDURE — 72040 X-RAY EXAM NECK SPINE 2-3 VW: CPT | Mod: 26

## 2018-02-26 PROCEDURE — 71046 X-RAY EXAM CHEST 2 VIEWS: CPT

## 2018-02-26 PROCEDURE — 72070 X-RAY EXAM THORAC SPINE 2VWS: CPT

## 2018-03-01 ENCOUNTER — FORM ENCOUNTER (OUTPATIENT)
Age: 68
End: 2018-03-01

## 2018-03-02 ENCOUNTER — APPOINTMENT (OUTPATIENT)
Dept: ULTRASOUND IMAGING | Facility: CLINIC | Age: 68
End: 2018-03-02
Payer: MEDICARE

## 2018-03-02 ENCOUNTER — OUTPATIENT (OUTPATIENT)
Dept: OUTPATIENT SERVICES | Facility: HOSPITAL | Age: 68
LOS: 1 days | End: 2018-03-02
Payer: MEDICARE

## 2018-03-02 DIAGNOSIS — H02.209 UNSPECIFIED LAGOPHTHALMOS UNSPECIFIED EYE, UNSPECIFIED EYELID: Chronic | ICD-10-CM

## 2018-03-02 DIAGNOSIS — R31.29 OTHER MICROSCOPIC HEMATURIA: ICD-10-CM

## 2018-03-02 DIAGNOSIS — H26.9 UNSPECIFIED CATARACT: Chronic | ICD-10-CM

## 2018-03-02 PROCEDURE — 76775 US EXAM ABDO BACK WALL LIM: CPT | Mod: 26

## 2018-03-02 PROCEDURE — 76775 US EXAM ABDO BACK WALL LIM: CPT

## 2018-03-19 ENCOUNTER — APPOINTMENT (OUTPATIENT)
Dept: INTERNAL MEDICINE | Facility: CLINIC | Age: 68
End: 2018-03-19
Payer: MEDICARE

## 2018-03-19 ENCOUNTER — OUTPATIENT (OUTPATIENT)
Dept: OUTPATIENT SERVICES | Facility: HOSPITAL | Age: 68
LOS: 1 days | End: 2018-03-19
Payer: MEDICARE

## 2018-03-19 VITALS
DIASTOLIC BLOOD PRESSURE: 70 MMHG | BODY MASS INDEX: 24.73 KG/M2 | HEIGHT: 60.98 IN | WEIGHT: 131 LBS | SYSTOLIC BLOOD PRESSURE: 118 MMHG

## 2018-03-19 DIAGNOSIS — I10 ESSENTIAL (PRIMARY) HYPERTENSION: ICD-10-CM

## 2018-03-19 DIAGNOSIS — H26.9 UNSPECIFIED CATARACT: Chronic | ICD-10-CM

## 2018-03-19 DIAGNOSIS — H02.209 UNSPECIFIED LAGOPHTHALMOS UNSPECIFIED EYE, UNSPECIFIED EYELID: Chronic | ICD-10-CM

## 2018-03-19 PROCEDURE — 99214 OFFICE O/P EST MOD 30 MIN: CPT | Mod: GC

## 2018-03-19 PROCEDURE — G0463: CPT

## 2018-03-22 ENCOUNTER — RESULT REVIEW (OUTPATIENT)
Age: 68
End: 2018-03-22

## 2018-03-22 LAB
ALBUMIN SERPL ELPH-MCNC: 4.5 G/DL
ALP BLD-CCNC: 85 U/L
ALT SERPL-CCNC: 7 U/L
ANION GAP SERPL CALC-SCNC: 13 MMOL/L
AST SERPL-CCNC: 25 U/L
BILIRUB SERPL-MCNC: <0.2 MG/DL
BUN SERPL-MCNC: 12 MG/DL
CALCIUM SERPL-MCNC: 9.8 MG/DL
CHLORIDE SERPL-SCNC: 97 MMOL/L
CO2 SERPL-SCNC: 29 MMOL/L
CREAT SERPL-MCNC: 0.76 MG/DL
GLUCOSE SERPL-MCNC: 100 MG/DL
POTASSIUM SERPL-SCNC: 4.5 MMOL/L
PROT SERPL-MCNC: 8.1 G/DL
SODIUM SERPL-SCNC: 139 MMOL/L

## 2018-03-23 ENCOUNTER — APPOINTMENT (OUTPATIENT)
Dept: INTERNAL MEDICINE | Facility: CLINIC | Age: 68
End: 2018-03-23

## 2018-03-23 ENCOUNTER — APPOINTMENT (OUTPATIENT)
Dept: OPHTHALMOLOGY | Facility: CLINIC | Age: 68
End: 2018-03-23
Payer: MEDICARE

## 2018-03-23 PROCEDURE — 92015 DETERMINE REFRACTIVE STATE: CPT

## 2018-03-23 PROCEDURE — 92012 INTRM OPH EXAM EST PATIENT: CPT

## 2018-03-29 DIAGNOSIS — R31.29 OTHER MICROSCOPIC HEMATURIA: ICD-10-CM

## 2018-03-29 DIAGNOSIS — R94.5 ABNORMAL RESULTS OF LIVER FUNCTION STUDIES: ICD-10-CM

## 2018-03-29 DIAGNOSIS — R51 HEADACHE: ICD-10-CM

## 2018-04-19 ENCOUNTER — APPOINTMENT (OUTPATIENT)
Dept: UROGYNECOLOGY | Facility: CLINIC | Age: 68
End: 2018-04-19
Payer: MEDICARE

## 2018-04-19 LAB
BILIRUB UR QL STRIP: NORMAL
CLARITY UR: CLEAR
COLLECTION METHOD: NORMAL
GLUCOSE UR-MCNC: NORMAL
HCG UR QL: 0.2 EU/DL
HGB UR QL STRIP.AUTO: NORMAL
KETONES UR-MCNC: NORMAL
LEUKOCYTE ESTERASE UR QL STRIP: NORMAL
NITRITE UR QL STRIP: NORMAL
PH UR STRIP: 7
PROT UR STRIP-MCNC: NORMAL
SP GR UR STRIP: 1.01

## 2018-04-19 PROCEDURE — 81003 URINALYSIS AUTO W/O SCOPE: CPT | Mod: QW

## 2018-04-19 PROCEDURE — 99213 OFFICE O/P EST LOW 20 MIN: CPT | Mod: 25

## 2018-04-19 PROCEDURE — 51725 SIMPLE CYSTOMETROGRAM: CPT

## 2018-04-19 PROCEDURE — 51741 ELECTRO-UROFLOWMETRY FIRST: CPT

## 2018-04-23 ENCOUNTER — APPOINTMENT (OUTPATIENT)
Dept: INTERNAL MEDICINE | Facility: CLINIC | Age: 68
End: 2018-04-23

## 2018-04-23 LAB — BACTERIA UR CULT: NORMAL

## 2018-04-27 LAB
APPEARANCE: CLEAR
BACTERIA: NEGATIVE
BILIRUBIN URINE: NEGATIVE
BLOOD URINE: ABNORMAL
COLOR: YELLOW
GLUCOSE QUALITATIVE U: NEGATIVE MG/DL
HYALINE CASTS: 2 /LPF
KETONES URINE: NEGATIVE
LEUKOCYTE ESTERASE URINE: ABNORMAL
MICROSCOPIC-UA: NORMAL
NITRITE URINE: NEGATIVE
PH URINE: 6.5
PROTEIN URINE: NEGATIVE MG/DL
RED BLOOD CELLS URINE: 20 /HPF
SPECIFIC GRAVITY URINE: 1.01
SQUAMOUS EPITHELIAL CELLS: 2 /HPF
UROBILINOGEN URINE: NEGATIVE MG/DL
WHITE BLOOD CELLS URINE: 15 /HPF

## 2018-04-29 ENCOUNTER — FORM ENCOUNTER (OUTPATIENT)
Age: 68
End: 2018-04-29

## 2018-04-30 ENCOUNTER — OUTPATIENT (OUTPATIENT)
Dept: OUTPATIENT SERVICES | Facility: HOSPITAL | Age: 68
LOS: 1 days | End: 2018-04-30
Payer: MEDICARE

## 2018-04-30 ENCOUNTER — APPOINTMENT (OUTPATIENT)
Dept: MAMMOGRAPHY | Facility: IMAGING CENTER | Age: 68
End: 2018-04-30
Payer: MEDICARE

## 2018-04-30 DIAGNOSIS — H02.209 UNSPECIFIED LAGOPHTHALMOS UNSPECIFIED EYE, UNSPECIFIED EYELID: Chronic | ICD-10-CM

## 2018-04-30 DIAGNOSIS — H26.9 UNSPECIFIED CATARACT: Chronic | ICD-10-CM

## 2018-04-30 DIAGNOSIS — Z00.00 ENCOUNTER FOR GENERAL ADULT MEDICAL EXAMINATION WITHOUT ABNORMAL FINDINGS: ICD-10-CM

## 2018-04-30 PROCEDURE — 77063 BREAST TOMOSYNTHESIS BI: CPT | Mod: 26

## 2018-04-30 PROCEDURE — 77063 BREAST TOMOSYNTHESIS BI: CPT

## 2018-04-30 PROCEDURE — 77067 SCR MAMMO BI INCL CAD: CPT

## 2018-04-30 PROCEDURE — 77067 SCR MAMMO BI INCL CAD: CPT | Mod: 26

## 2018-05-17 ENCOUNTER — APPOINTMENT (OUTPATIENT)
Dept: UROGYNECOLOGY | Facility: CLINIC | Age: 68
End: 2018-05-17

## 2018-05-26 ENCOUNTER — APPOINTMENT (OUTPATIENT)
Dept: MRI IMAGING | Facility: IMAGING CENTER | Age: 68
End: 2018-05-26
Payer: MEDICARE

## 2018-05-26 ENCOUNTER — OUTPATIENT (OUTPATIENT)
Dept: OUTPATIENT SERVICES | Facility: HOSPITAL | Age: 68
LOS: 1 days | End: 2018-05-26
Payer: MEDICARE

## 2018-05-26 DIAGNOSIS — H02.209 UNSPECIFIED LAGOPHTHALMOS UNSPECIFIED EYE, UNSPECIFIED EYELID: Chronic | ICD-10-CM

## 2018-05-26 DIAGNOSIS — R31.29 OTHER MICROSCOPIC HEMATURIA: ICD-10-CM

## 2018-05-26 DIAGNOSIS — H26.9 UNSPECIFIED CATARACT: Chronic | ICD-10-CM

## 2018-05-26 PROCEDURE — 82565 ASSAY OF CREATININE: CPT

## 2018-05-26 PROCEDURE — 74183 MRI ABD W/O CNTR FLWD CNTR: CPT | Mod: 26

## 2018-05-26 PROCEDURE — 72197 MRI PELVIS W/O & W/DYE: CPT

## 2018-05-26 PROCEDURE — 74183 MRI ABD W/O CNTR FLWD CNTR: CPT

## 2018-05-26 PROCEDURE — A9585: CPT

## 2018-05-26 PROCEDURE — 72197 MRI PELVIS W/O & W/DYE: CPT | Mod: 26

## 2018-06-01 ENCOUNTER — APPOINTMENT (OUTPATIENT)
Dept: INTERNAL MEDICINE | Facility: CLINIC | Age: 68
End: 2018-06-01
Payer: MEDICARE

## 2018-06-01 ENCOUNTER — OUTPATIENT (OUTPATIENT)
Dept: OUTPATIENT SERVICES | Facility: HOSPITAL | Age: 68
LOS: 1 days | End: 2018-06-01
Payer: MEDICARE

## 2018-06-01 VITALS
HEIGHT: 60 IN | DIASTOLIC BLOOD PRESSURE: 70 MMHG | SYSTOLIC BLOOD PRESSURE: 100 MMHG | BODY MASS INDEX: 26.11 KG/M2 | HEART RATE: 75 BPM | WEIGHT: 133 LBS

## 2018-06-01 DIAGNOSIS — H26.9 UNSPECIFIED CATARACT: Chronic | ICD-10-CM

## 2018-06-01 DIAGNOSIS — I10 ESSENTIAL (PRIMARY) HYPERTENSION: ICD-10-CM

## 2018-06-01 DIAGNOSIS — H02.209 UNSPECIFIED LAGOPHTHALMOS UNSPECIFIED EYE, UNSPECIFIED EYELID: Chronic | ICD-10-CM

## 2018-06-01 DIAGNOSIS — R94.5 ABNORMAL RESULTS OF LIVER FUNCTION STUDIES: ICD-10-CM

## 2018-06-01 DIAGNOSIS — R51 HEADACHE: ICD-10-CM

## 2018-06-01 DIAGNOSIS — R31.29 OTHER MICROSCOPIC HEMATURIA: ICD-10-CM

## 2018-06-01 PROCEDURE — 99213 OFFICE O/P EST LOW 20 MIN: CPT | Mod: GE

## 2018-06-01 PROCEDURE — G0463: CPT

## 2018-06-01 NOTE — PLAN
[FreeTextEntry1] : # Depression \par PHQ-9  is 5 today significantly improved from 12 last visit\par Pt does not want to see a therapist, at this time feels that she is improving on own\par Monitor \par \par #Microscopic hematuria\par - Chronic, MRI abd/pelvis wnl\par - f/u w GYN-URO for cystoscopy\par \par #Hypothyroidism/thyroid tenderness\par - TSH w free T4 wnl recently, will refer for thyroid us to evaluate for a mass\par  \par #HCM\par Mammo wnl from 3/2018\par Colonoscopy w polyps - path pending\par UTD on TDap, pneumovax/prevnar \par

## 2018-06-01 NOTE — REVIEW OF SYSTEMS
[Feeling Tired] : feeling tired [Heartburn] : heartburn [see HPI] : see HPI [Depression] : depression [Fever] : no fever [Chills] : no chills [Eyesight Problems] : no eyesight problems [Nosebleeds] : no nosebleeds [Nasal Discharge] : no nasal discharge [Sore Throat] : no sore throat [Chest Pain] : no chest pain [Palpitations] : no palpitations [Lower Ext Edema] : no lower extremity edema [Shortness Of Breath] : no shortness of breath [Wheezing] : no wheezing [Cough] : no cough [SOB on Exertion] : no shortness of breath during exertion [Orthopnea] : no orthopnea [Abdominal Pain] : no abdominal pain [Constipation] : no constipation [Diarrhea] : no diarrhea [Dysuria] : no dysuria [Incontinence] : no incontinence [Vaginal Discharge] : no vaginal discharge [Dizziness] : no dizziness [Fainting] : no fainting [Suicidal] : not suicidal [Anxiety] : no anxiety [Feelings Of Weakness] : no feelings of weakness

## 2018-06-01 NOTE — COUNSELING
[Healthy eating counseling provided] : healthy eating [Activity counseling provided] : activity [Behavioral health counseling provided] : behavioral health  [Engage in a relaxing activity] : Engage in a relaxing activity

## 2018-06-01 NOTE — HISTORY OF PRESENT ILLNESS
[FreeTextEntry1] : L knee pain  [de-identified] : 66 yo F with pmhx of hypothyroidism, anxiety and pre diabetes presenting for CPE. \par \par Patient states she has been okay in the interval. Her headaches have improved though she still gets them. They are worse when she is stressed. Patient says that her mood is still down and she does not like doing any activities. Her other brother recently had a spine surgery which was stressful for her, however states that her son cheers her up. Patient does not like talking about why she is upset asit makes her feel worse. She does not want to see a therapist.  \par \par Patient notes that for the past few months her L knee pain has been worse than usual. She had a steroid injection 1 yr ago which improved the pain but now it is making it difficult for her to climb stairs. She is stable on flat ground though and not using any cane or support to walk. \par \par Patient saw Gyn Uro in the interval for microscopic hematuria. MRI abd/pelvis was wnl without clear etiology of hematuria. She is pending a cystoscopy. Pt notes that sometimes her urine smells like metal. However she has never seen rik blood. Denies any weight loss, loss of appetite, fatigue. \par

## 2018-06-01 NOTE — ASSESSMENT
[FreeTextEntry1] : 67 yo F w pmhx of hypothyroidism, anxiety and pre-diabetes presenting for a CPE \par \par

## 2018-06-01 NOTE — PHYSICAL EXAM
[General Appearance - Alert] : alert [General Appearance - In No Acute Distress] : in no acute distress [General Appearance - Well-Appearing] : healthy appearing [Sclera] : the sclera and conjunctiva were normal [PERRL With Normal Accommodation] : pupils were equal in size, round, and reactive to light [Extraocular Movements] : extraocular movements were intact [Both Tympanic Membranes Were Examined] : both tympanic membranes were normal [Oropharynx] : the oropharynx was normal [] : the neck was supple [Respiration, Rhythm And Depth] : normal respiratory rhythm and effort [Auscultation Breath Sounds / Voice Sounds] : lungs were clear to auscultation bilaterally [Heart Rate And Rhythm] : heart rate was normal and rhythm regular [Heart Sounds] : normal S1 and S2 [Heart Sounds Gallop] : no gallops [Murmurs] : no murmurs [Edema] : there was no peripheral edema [Bowel Sounds] : normal bowel sounds [Abdomen Soft] : soft [Abdomen Tenderness] : non-tender [Cervical Lymph Nodes Enlarged Posterior Bilaterally] : posterior cervical [Cervical Lymph Nodes Enlarged Anterior Bilaterally] : anterior cervical [Supraclavicular Lymph Nodes Enlarged Bilaterally] : supraclavicular [No CVA Tenderness] : no ~M costovertebral angle tenderness [No Spinal Tenderness] : no spinal tenderness [Motor Tone] : muscle strength and tone were normal [Sensation] : the sensory exam was normal to light touch and pinprick [Motor Exam] : the motor exam was normal [No Focal Deficits] : no focal deficits [Impaired Insight] : insight and judgment were intact [Over the Past 2 Weeks, Have You Felt Down, Depressed, or Hopeless?] : 1.) Over the past 2 weeks, have you felt down, depressed, or hopeless? Yes [Over the Past 2 Weeks, Have You Felt Little Interest or Pleasure Doing Things?] : 2.) Over the past 2 weeks, have you felt little interest or pleasure doing things? Yes [Nasal Cavity] : the nasal mucosa and septum were normal [FreeTextEntry1] : PHQ 9  = 5

## 2018-06-01 NOTE — HEALTH RISK ASSESSMENT
[Fair] : ~his/her~ current health as fair  [Poor] : ~his/her~ mood as  poor [Alone] : lives alone [Single] : single [Feels Safe at Home] : Feels safe at home [Smoke Detector] : smoke detector [Carbon Monoxide Detector] : carbon monoxide detector [No falls in past year] : Patient reported no falls in the past year [2] : 2) Feeling down, depressed, or hopeless for more than half of the days (2) [Patient reported mammogram was normal] : Patient reported mammogram was normal [Patient reported PAP Smear was normal] : Patient reported PAP Smear was normal [Patient reported bone density results were abnormal] : Patient reported bone density results were abnormal [Patient reported colonoscopy was abnormal] : Patient reported colonoscopy was abnormal [] : No [FreeTextEntry1] : Somewhat difficult  [[id number]] : 4 [[id number]] : 5 [Change in mental status noted] : No change in mental status noted [MammogramDate] : 4/2018 [PapSmearDate] : 5/2017 [BoneDensityDate] : 4/2016 [ColonoscopyDate] : 3/2018 [ColonoscopyComments] : 2 sessile polyps removed, path pending  [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

## 2018-06-01 NOTE — PAST MEDICAL HISTORY
[unknown] : the patient is unsure of the date of her LMP [Postmenopausal] : history of menopause having occurred

## 2018-06-02 LAB
CHOLEST SERPL-MCNC: 191 MG/DL
CHOLEST/HDLC SERPL: 2.9 RATIO
HDLC SERPL-MCNC: 65 MG/DL
LDLC SERPL CALC-MCNC: 107 MG/DL
TRIGL SERPL-MCNC: 96 MG/DL

## 2018-06-12 DIAGNOSIS — E03.9 HYPOTHYROIDISM, UNSPECIFIED: ICD-10-CM

## 2018-06-26 ENCOUNTER — FORM ENCOUNTER (OUTPATIENT)
Age: 68
End: 2018-06-26

## 2018-06-27 ENCOUNTER — APPOINTMENT (OUTPATIENT)
Dept: ULTRASOUND IMAGING | Facility: CLINIC | Age: 68
End: 2018-06-27
Payer: MEDICARE

## 2018-06-27 ENCOUNTER — OUTPATIENT (OUTPATIENT)
Dept: OUTPATIENT SERVICES | Facility: HOSPITAL | Age: 68
LOS: 1 days | End: 2018-06-27
Payer: MEDICARE

## 2018-06-27 DIAGNOSIS — R00.2 PALPITATIONS: ICD-10-CM

## 2018-06-27 DIAGNOSIS — E03.9 HYPOTHYROIDISM, UNSPECIFIED: ICD-10-CM

## 2018-06-27 DIAGNOSIS — H02.209 UNSPECIFIED LAGOPHTHALMOS UNSPECIFIED EYE, UNSPECIFIED EYELID: Chronic | ICD-10-CM

## 2018-06-27 DIAGNOSIS — H26.9 UNSPECIFIED CATARACT: Chronic | ICD-10-CM

## 2018-06-27 PROCEDURE — 76536 US EXAM OF HEAD AND NECK: CPT | Mod: 26

## 2018-06-27 PROCEDURE — 76536 US EXAM OF HEAD AND NECK: CPT

## 2018-07-19 ENCOUNTER — OUTPATIENT (OUTPATIENT)
Dept: OUTPATIENT SERVICES | Facility: HOSPITAL | Age: 68
LOS: 1 days | End: 2018-07-19
Payer: MEDICARE

## 2018-07-19 ENCOUNTER — APPOINTMENT (OUTPATIENT)
Dept: UROGYNECOLOGY | Facility: CLINIC | Age: 68
End: 2018-07-19
Payer: MEDICARE

## 2018-07-19 DIAGNOSIS — Z01.818 ENCOUNTER FOR OTHER PREPROCEDURAL EXAMINATION: ICD-10-CM

## 2018-07-19 DIAGNOSIS — H02.209 UNSPECIFIED LAGOPHTHALMOS UNSPECIFIED EYE, UNSPECIFIED EYELID: Chronic | ICD-10-CM

## 2018-07-19 DIAGNOSIS — H26.9 UNSPECIFIED CATARACT: Chronic | ICD-10-CM

## 2018-07-19 LAB
BILIRUB UR QL STRIP: NORMAL
CLARITY UR: CLEAR
COLLECTION METHOD: NORMAL
GLUCOSE UR-MCNC: NORMAL
HCG UR QL: 0.2 EU/DL
HGB UR QL STRIP.AUTO: NORMAL
KETONES UR-MCNC: NORMAL
LEUKOCYTE ESTERASE UR QL STRIP: NORMAL
NITRITE UR QL STRIP: NORMAL
PH UR STRIP: 6
PROT UR STRIP-MCNC: NORMAL
SP GR UR STRIP: 1.01

## 2018-07-19 PROCEDURE — 52000 CYSTOURETHROSCOPY: CPT

## 2018-07-23 ENCOUNTER — RESULT REVIEW (OUTPATIENT)
Age: 68
End: 2018-07-23

## 2018-07-23 LAB — BACTERIA UR CULT: NORMAL

## 2018-07-27 ENCOUNTER — APPOINTMENT (OUTPATIENT)
Dept: OPHTHALMOLOGY | Facility: CLINIC | Age: 68
End: 2018-07-27
Payer: MEDICARE

## 2018-07-27 PROCEDURE — 92083 EXTENDED VISUAL FIELD XM: CPT

## 2018-07-27 PROCEDURE — ZZZZZ: CPT

## 2018-07-27 PROCEDURE — 92012 INTRM OPH EXAM EST PATIENT: CPT

## 2018-08-13 DIAGNOSIS — R31.9 HEMATURIA, UNSPECIFIED: ICD-10-CM

## 2018-08-15 ENCOUNTER — EMERGENCY (EMERGENCY)
Facility: HOSPITAL | Age: 68
LOS: 1 days | Discharge: HOME | End: 2018-08-15
Attending: EMERGENCY MEDICINE
Payer: MEDICARE

## 2018-08-15 VITALS
SYSTOLIC BLOOD PRESSURE: 136 MMHG | OXYGEN SATURATION: 97 % | HEART RATE: 89 BPM | DIASTOLIC BLOOD PRESSURE: 82 MMHG | RESPIRATION RATE: 18 BRPM | TEMPERATURE: 99 F

## 2018-08-15 DIAGNOSIS — H26.9 UNSPECIFIED CATARACT: Chronic | ICD-10-CM

## 2018-08-15 DIAGNOSIS — H02.209 UNSPECIFIED LAGOPHTHALMOS UNSPECIFIED EYE, UNSPECIFIED EYELID: Chronic | ICD-10-CM

## 2018-08-15 PROCEDURE — 99283 EMERGENCY DEPT VISIT LOW MDM: CPT

## 2018-08-15 PROCEDURE — 73030 X-RAY EXAM OF SHOULDER: CPT | Mod: 26,LT

## 2018-08-15 RX ORDER — IBUPROFEN 200 MG
600 TABLET ORAL ONCE
Qty: 0 | Refills: 0 | Status: COMPLETED | OUTPATIENT
Start: 2018-08-15 | End: 2018-08-15

## 2018-08-15 RX ADMIN — Medication 600 MILLIGRAM(S): at 22:47

## 2018-08-15 RX ADMIN — Medication 600 MILLIGRAM(S): at 23:15

## 2018-08-15 NOTE — ED PROVIDER NOTE - MUSCULOSKELETAL MINIMAL EXAM
Pain with palpation of anterior, posterior, lateral left shoulder. With radiation of pain down the left upper extremity with palpation.Limited range of motion of left shoulder secondary to pain. Normal sensation and strength distally to left upper extremity.

## 2018-08-15 NOTE — ED PROVIDER NOTE - MEDICAL DECISION MAKING DETAILS
Left shoulder pain since Sunday, which she woke up with. Pain radiates down left upper extremity, pain worse with movements of shoulder. Suspect left calcific tendinitis. We'll obtain x-ray, provide analgesia, consider injection.

## 2018-08-15 NOTE — ED ADULT NURSE NOTE - NSIMPLEMENTINTERV_GEN_ALL_ED
Implemented All Universal Safety Interventions:  [place] to call system. Call bell, personal items and telephone within reach. Instruct patient to call for assistance. Room bathroom lighting operational. Non-slip footwear when patient is off stretcher. Physically safe environment: no spills, clutter or unnecessary equipment. Stretcher in lowest position, wheels locked, appropriate side rails in place.

## 2018-08-15 NOTE — ED PROVIDER NOTE - PMH
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

## 2018-08-15 NOTE — ED ADULT NURSE NOTE - OBJECTIVE STATEMENT
67 y/o F patient presents to ED from home c/o left shoulder pain. Patient states she woke up in bed 3 days ago and began to feel sudden pain in left shoulder. Patient denies falls/injuries. As per patient pain is intermittent and non radiating. Patient A&Ox3. speech clear and coherent. lungs CTA. skin warm and intact. abdomen soft, non tender, non distended. ambulatory. Patient denies HA, dizziness, SOB, chest pain, abdominal pain, N/V/D, bowel/bladder changes. VSS. Safety and comfort measures provided and maintained. MD bedside.

## 2018-08-15 NOTE — ED PROVIDER NOTE - PROGRESS NOTE DETAILS
Improvement of pain and range of motion after nsaids.  Exam still c/w bursitis/RTC syndrome.  No chest pain.  No pleuritic component.  Already seen by sports ortho outpt.  Will give PO steroids and refer to orthopedist.  No indication for sling at this point -- encouraged range of motion/C[place]man exercises and gave precautions re frozen shoulder.  --NINFAM

## 2018-08-15 NOTE — ED PROVIDER NOTE - OBJECTIVE STATEMENT
68-year-old female presenting with left shoulder pain since Monday. Patient reports some soreness on Sunday on her left side on Sunday night. Patient reports waking up with severe left shoulder pain radiating down the left upper extremity. Patient reports shooting pain that is worse with movement, she reports paresthesias down to dorsal left hand. Denies any known trauma, weakness, loss of sensation, chest pain, shortness of breath, neck pain. Patient taking Tylenol which he last took at 3 PM today with minimal relief. Patient takes Synthroid and eyedrops for glaucoma

## 2018-08-16 VITALS
TEMPERATURE: 98 F | SYSTOLIC BLOOD PRESSURE: 129 MMHG | DIASTOLIC BLOOD PRESSURE: 83 MMHG | HEART RATE: 84 BPM | OXYGEN SATURATION: 100 % | RESPIRATION RATE: 18 BRPM

## 2018-08-16 PROCEDURE — 73020 X-RAY EXAM OF SHOULDER: CPT

## 2018-08-16 PROCEDURE — 99284 EMERGENCY DEPT VISIT MOD MDM: CPT

## 2018-08-16 PROCEDURE — 73030 X-RAY EXAM OF SHOULDER: CPT

## 2018-08-16 RX ORDER — ACETAMINOPHEN 500 MG
975 TABLET ORAL ONCE
Qty: 0 | Refills: 0 | Status: COMPLETED | OUTPATIENT
Start: 2018-08-16 | End: 2018-08-16

## 2018-08-16 RX ADMIN — Medication 975 MILLIGRAM(S): at 00:52

## 2018-08-16 RX ADMIN — Medication 50 MILLIGRAM(S): at 00:52

## 2018-08-22 ENCOUNTER — APPOINTMENT (OUTPATIENT)
Dept: ORTHOPEDIC SURGERY | Facility: CLINIC | Age: 68
End: 2018-08-22
Payer: MEDICARE

## 2018-08-22 VITALS
HEART RATE: 79 BPM | HEIGHT: 61 IN | BODY MASS INDEX: 24.55 KG/M2 | SYSTOLIC BLOOD PRESSURE: 124 MMHG | WEIGHT: 130 LBS | DIASTOLIC BLOOD PRESSURE: 69 MMHG

## 2018-08-22 PROCEDURE — 99214 OFFICE O/P EST MOD 30 MIN: CPT

## 2018-09-20 ENCOUNTER — TRANSCRIPTION ENCOUNTER (OUTPATIENT)
Age: 68
End: 2018-09-20

## 2018-10-18 ENCOUNTER — APPOINTMENT (OUTPATIENT)
Dept: OPHTHALMOLOGY | Facility: CLINIC | Age: 68
End: 2018-10-18
Payer: MEDICARE

## 2018-10-18 DIAGNOSIS — H26.492 OTHER SECONDARY CATARACT, LEFT EYE: ICD-10-CM

## 2018-10-18 PROCEDURE — 68761 CLOSE TEAR DUCT OPENING: CPT | Mod: E2,E4

## 2018-10-18 PROCEDURE — 99214 OFFICE O/P EST MOD 30 MIN: CPT

## 2018-10-18 PROCEDURE — 76514 ECHO EXAM OF EYE THICKNESS: CPT

## 2018-10-18 PROCEDURE — 92250 FUNDUS PHOTOGRAPHY W/I&R: CPT | Mod: TC

## 2018-11-15 ENCOUNTER — APPOINTMENT (OUTPATIENT)
Dept: INTERNAL MEDICINE | Facility: CLINIC | Age: 68
End: 2018-11-15
Payer: MEDICARE

## 2018-11-15 ENCOUNTER — OUTPATIENT (OUTPATIENT)
Dept: OUTPATIENT SERVICES | Facility: HOSPITAL | Age: 68
LOS: 1 days | End: 2018-11-15
Payer: MEDICARE

## 2018-11-15 VITALS
DIASTOLIC BLOOD PRESSURE: 80 MMHG | WEIGHT: 130 LBS | BODY MASS INDEX: 24.55 KG/M2 | SYSTOLIC BLOOD PRESSURE: 130 MMHG | HEIGHT: 61 IN

## 2018-11-15 VITALS — TEMPERATURE: 98.3 F

## 2018-11-15 DIAGNOSIS — J32.9 CHRONIC SINUSITIS, UNSPECIFIED: ICD-10-CM

## 2018-11-15 DIAGNOSIS — I10 ESSENTIAL (PRIMARY) HYPERTENSION: ICD-10-CM

## 2018-11-15 DIAGNOSIS — H26.9 UNSPECIFIED CATARACT: Chronic | ICD-10-CM

## 2018-11-15 DIAGNOSIS — H02.209 UNSPECIFIED LAGOPHTHALMOS UNSPECIFIED EYE, UNSPECIFIED EYELID: Chronic | ICD-10-CM

## 2018-11-15 PROCEDURE — 99213 OFFICE O/P EST LOW 20 MIN: CPT | Mod: GE

## 2018-11-15 PROCEDURE — G0463: CPT

## 2018-11-15 RX ORDER — AZELASTINE HYDROCHLORIDE 0.5 MG/ML
0.05 SOLUTION/ DROPS OPHTHALMIC TWICE DAILY
Qty: 1 | Refills: 3 | Status: DISCONTINUED | COMMUNITY
Start: 2017-06-02 | End: 2018-11-15

## 2018-11-15 NOTE — ASSESSMENT
[FreeTextEntry1] : Pt is a 67yo F with PMH hypothyroidism, anxiety and prediabetes returning to clinic for acute visit for cold symptoms x 10 days. \par \par Acute non-recurrent frontal sinus infection: frontal L sinus TTP on exam with fevers at home, duration 10 days w/o improvement in setting of other URI symptoms. Lungs CTAB, and patient reports mild dry cough. No concern for PNA at this time.\par -start Augmentin DS x 7 days\par -c/w Mucinex prn\par -instructed patient to call our clinic back if no improvement in 5-7 days.\par \par Case d/w Dr. Whaley \par RTC 6 mo, or sooner if symptoms persist

## 2018-11-15 NOTE — REVIEW OF SYSTEMS
[Muscle Pain] : muscle pain [Headache] : headache [Fever] : no fever [Chills] : no chills [Night Sweats] : no night sweats [Discharge] : no discharge [Pain] : no pain [Vision Problems] : no vision problems [Earache] : no earache [Nasal Discharge] : no nasal discharge [Chest Pain] : no chest pain [Orthopnea] : no orthopnea [Shortness Of Breath] : no shortness of breath [Wheezing] : no wheezing [Abdominal Pain] : no abdominal pain [Vomiting] : no vomiting [Dysuria] : no dysuria [Hematuria] : no hematuria [Joint Pain] : no joint pain [Itching] : no itching [Skin Rash] : no skin rash [Memory Loss] : no memory loss [Suicidal] : not suicidal [Insomnia] : no insomnia [Easy Bleeding] : no easy bleeding [Easy Bruising] : no easy bruising [FreeTextEntry4] : sinus pressure, headche sometimes, runny nose, sore throat

## 2018-11-15 NOTE — HISTORY OF PRESENT ILLNESS
[FreeTextEntry8] : Pt is a 67yo F with PMH hypothyroidism, anxiety and prediabetes returning to clinic for acute visit for cold symptoms x 10 days. No sick contacts at home. She reports hoarse voice, dry voice, body aches, headaches at times, watering eyes, runny nose, cough (non-productive). Denies pain urinating, abd pain, change in bowel habits, leg swelling, chest pain, jaw/arm pain. No recent travel history. For work she cleans houses occasionally. She has been able to work with some difficulty. No new joint pain, joint swelling, or rashes. She had sinus pressure when she had a headache. She has been taking OTC Allegra 180, Mucinex liquid, and Nyquil with minimal relief. She has had similar symptoms in the past, never diagnosed and treated with PNA. In the past, she reports that eventually they just go away. She got her flu shot this year in September. She reports fevers at home T 38.6degF with chills.

## 2018-11-15 NOTE — PHYSICAL EXAM
[No Acute Distress] : no acute distress [Well Nourished] : well nourished [Normal Sclera/Conjunctiva] : normal sclera/conjunctiva [PERRL] : pupils equal round and reactive to light [Normal Outer Ear/Nose] : the outer ears and nose were normal in appearance [Normal Oropharynx] : the oropharynx was normal [No JVD] : no jugular venous distention [Supple] : supple [No Respiratory Distress] : no respiratory distress  [Clear to Auscultation] : lungs were clear to auscultation bilaterally [Normal Rate] : normal rate  [Regular Rhythm] : with a regular rhythm [Normal S1, S2] : normal S1 and S2 [No Abdominal Bruit] : a ~M bruit was not heard ~T in the abdomen [No Edema] : there was no peripheral edema [Soft] : abdomen soft [Non Tender] : non-tender [Normal Posterior Cervical Nodes] : no posterior cervical lymphadenopathy [Normal Anterior Cervical Nodes] : no anterior cervical lymphadenopathy [No CVA Tenderness] : no CVA  tenderness [No Spinal Tenderness] : no spinal tenderness [No Joint Swelling] : no joint swelling [Grossly Normal Strength/Tone] : grossly normal strength/tone [No Rash] : no rash [Normal Gait] : normal gait [No Focal Deficits] : no focal deficits [Normal Affect] : the affect was normal [Normal Insight/Judgement] : insight and judgment were intact [Acne] : no acne [de-identified] : some conj injection [de-identified] : erythematous nasal mucosa, TTP over L>R frontal sinus

## 2018-11-26 DIAGNOSIS — J06.9 ACUTE UPPER RESPIRATORY INFECTION, UNSPECIFIED: ICD-10-CM

## 2018-11-26 DIAGNOSIS — J01.10 ACUTE FRONTAL SINUSITIS, UNSPECIFIED: ICD-10-CM

## 2018-11-29 ENCOUNTER — APPOINTMENT (OUTPATIENT)
Dept: OPHTHALMOLOGY | Facility: CLINIC | Age: 68
End: 2018-11-29
Payer: MEDICARE

## 2018-11-29 PROCEDURE — 66821 AFTER CATARACT LASER SURGERY: CPT | Mod: LT

## 2018-12-03 ENCOUNTER — APPOINTMENT (OUTPATIENT)
Dept: OPHTHALMOLOGY | Facility: CLINIC | Age: 68
End: 2018-12-03
Payer: MEDICARE

## 2018-12-03 DIAGNOSIS — H26.493 OTHER SECONDARY CATARACT, BILATERAL: ICD-10-CM

## 2018-12-03 PROCEDURE — 99024 POSTOP FOLLOW-UP VISIT: CPT

## 2018-12-12 ENCOUNTER — OUTPATIENT (OUTPATIENT)
Dept: OUTPATIENT SERVICES | Facility: HOSPITAL | Age: 68
LOS: 1 days | End: 2018-12-12
Payer: MEDICARE

## 2018-12-12 ENCOUNTER — APPOINTMENT (OUTPATIENT)
Dept: INTERNAL MEDICINE | Facility: CLINIC | Age: 68
End: 2018-12-12
Payer: MEDICARE

## 2018-12-12 VITALS
BODY MASS INDEX: 24.92 KG/M2 | HEIGHT: 61 IN | WEIGHT: 132 LBS | DIASTOLIC BLOOD PRESSURE: 100 MMHG | SYSTOLIC BLOOD PRESSURE: 140 MMHG

## 2018-12-12 DIAGNOSIS — I10 ESSENTIAL (PRIMARY) HYPERTENSION: ICD-10-CM

## 2018-12-12 DIAGNOSIS — H02.209 UNSPECIFIED LAGOPHTHALMOS UNSPECIFIED EYE, UNSPECIFIED EYELID: Chronic | ICD-10-CM

## 2018-12-12 DIAGNOSIS — H26.9 UNSPECIFIED CATARACT: Chronic | ICD-10-CM

## 2018-12-12 PROCEDURE — G0463: CPT

## 2018-12-12 PROCEDURE — 99213 OFFICE O/P EST LOW 20 MIN: CPT | Mod: GE

## 2018-12-12 RX ORDER — AMOXICILLIN AND CLAVULANATE POTASSIUM 875; 125 MG/1; MG/1
875-125 TABLET, COATED ORAL
Qty: 14 | Refills: 0 | Status: COMPLETED | COMMUNITY
Start: 2018-11-15 | End: 2018-12-12

## 2018-12-12 NOTE — ASSESSMENT
[FreeTextEntry1] : Patient is a 67 yo F w/PMH of hypothyroidism, anxiety and pre-DM (A1c 5.7% in February 2018), presenting for follow up appointment.\par \par 1. Cough\par - likely post-infectious\par - review of past imaging of the lungs most recently CXR March 2018 clear\par - satting 98% on room air today\par - no wheezes, crackles or rhonchi on exam\par - unlikely related to fibrosis despite family history\par - start PRN albuterol inhaler\par - switch ipratropium nasal spray to Flonase given c/o dry throat\par \par RTC in 1 month if symptoms not improved, will get PFTs at that point if not improved\par \par jyoti Carreno\par \par Joselyn Melissa, R3

## 2018-12-12 NOTE — PHYSICAL EXAM
[No Acute Distress] : no acute distress [Well Nourished] : well nourished [Well Developed] : well developed [Well-Appearing] : well-appearing [Normal Sclera/Conjunctiva] : normal sclera/conjunctiva [PERRL] : pupils equal round and reactive to light [EOMI] : extraocular movements intact [Normal Outer Ear/Nose] : the outer ears and nose were normal in appearance [Normal Oropharynx] : the oropharynx was normal [Supple] : supple [No Respiratory Distress] : no respiratory distress  [Clear to Auscultation] : lungs were clear to auscultation bilaterally [No Accessory Muscle Use] : no accessory muscle use [Normal Rate] : normal rate  [Regular Rhythm] : with a regular rhythm [Normal S1, S2] : normal S1 and S2 [No Joint Swelling] : no joint swelling [No Rash] : no rash [Normal Gait] : normal gait [Normal Affect] : the affect was normal [Normal Insight/Judgement] : insight and judgment were intact

## 2018-12-12 NOTE — REVIEW OF SYSTEMS
[Chills] : chills [Sore Throat] : sore throat [Postnasal Drip] : postnasal drip [Cough] : cough [Fever] : no fever [Fatigue] : no fatigue [Night Sweats] : no night sweats [Recent Change In Weight] : ~T no recent weight change [Discharge] : no discharge [Pain] : no pain [Vision Problems] : no vision problems [Itching] : no itching [Earache] : no earache [Hearing Loss] : no hearing loss [Nosebleed] : no nosebleeds [Nasal Discharge] : no nasal discharge [Chest Pain] : no chest pain [Palpitations] : no palpitations [Shortness Of Breath] : no shortness of breath [Wheezing] : no wheezing [Abdominal Pain] : no abdominal pain [Nausea] : no nausea [Vomiting] : no vomiting [Joint Pain] : no joint pain [Muscle Pain] : no muscle pain [Skin Rash] : no skin rash [Headache] : no headache [Dizziness] : no dizziness [Fainting] : no fainting

## 2018-12-12 NOTE — HISTORY OF PRESENT ILLNESS
[de-identified] : Patient is a 69 yo F w/PMH of hypothyroidism, anxiety and pre-DM (A1c 5.7% in February 2018), presenting for follow up appointment.\par \par She reports she was recently seen in November for body aches, non-productive cough. She reports that she completed the antibiotics (Augmentin) which she was given at that time. However, her cough has persisted. Still non-productive. Notes it has been waking her from sleep.\par \par She reports that she has a strong family history of pulmonary fibrosis. Her brother and sister bother have had pulmonary fibrosis. Sister with RA. She reports mom passed from emphysema and was a never smoker.\par \par She denies SOB but reports chest tightness with deep breaths.\par \par Reports had positive PPD when she moved to the [place] s/p treatment for latent TB at that time.\par \par Review of past imaging: CXR March 2018 wnl, clear lungs. Prior CTAP in 2016 caught lower lung fields which were wnl.

## 2018-12-27 DIAGNOSIS — R05 COUGH: ICD-10-CM

## 2019-03-04 ENCOUNTER — OUTPATIENT (OUTPATIENT)
Dept: OUTPATIENT SERVICES | Facility: HOSPITAL | Age: 69
LOS: 1 days | End: 2019-03-04
Payer: MEDICARE

## 2019-03-04 ENCOUNTER — APPOINTMENT (OUTPATIENT)
Dept: INTERNAL MEDICINE | Facility: CLINIC | Age: 69
End: 2019-03-04
Payer: MEDICARE

## 2019-03-04 VITALS
WEIGHT: 132 LBS | BODY MASS INDEX: 24.92 KG/M2 | HEIGHT: 61 IN | DIASTOLIC BLOOD PRESSURE: 80 MMHG | SYSTOLIC BLOOD PRESSURE: 132 MMHG

## 2019-03-04 DIAGNOSIS — I10 ESSENTIAL (PRIMARY) HYPERTENSION: ICD-10-CM

## 2019-03-04 DIAGNOSIS — H26.9 UNSPECIFIED CATARACT: Chronic | ICD-10-CM

## 2019-03-04 DIAGNOSIS — H02.209 UNSPECIFIED LAGOPHTHALMOS UNSPECIFIED EYE, UNSPECIFIED EYELID: Chronic | ICD-10-CM

## 2019-03-04 PROCEDURE — G0463: CPT

## 2019-03-04 PROCEDURE — 99213 OFFICE O/P EST LOW 20 MIN: CPT | Mod: GE

## 2019-03-04 RX ORDER — IPRATROPIUM BROMIDE 21 UG/1
0.03 SPRAY NASAL 3 TIMES DAILY
Qty: 1 | Refills: 0 | Status: DISCONTINUED | COMMUNITY
Start: 2017-11-10 | End: 2019-03-04

## 2019-03-04 NOTE — PHYSICAL EXAM
[No Acute Distress] : no acute distress [Well Nourished] : well nourished [Well Developed] : well developed [Well-Appearing] : well-appearing [Normal Sclera/Conjunctiva] : normal sclera/conjunctiva [PERRL] : pupils equal round and reactive to light [Normal Outer Ear/Nose] : the outer ears and nose were normal in appearance [Normal Oropharynx] : the oropharynx was normal [No JVD] : no jugular venous distention [No Lymphadenopathy] : no lymphadenopathy [Thyroid Normal, No Nodules] : the thyroid was normal and there were no nodules present [No Respiratory Distress] : no respiratory distress  [Clear to Auscultation] : lungs were clear to auscultation bilaterally [No Accessory Muscle Use] : no accessory muscle use [Normal Rate] : normal rate  [Regular Rhythm] : with a regular rhythm [Normal S1, S2] : normal S1 and S2 [No Edema] : there was no peripheral edema [No Extremity Clubbing/Cyanosis] : no extremity clubbing/cyanosis [Soft] : abdomen soft [Non Tender] : non-tender [Non-distended] : non-distended [No HSM] : no HSM [Normal Bowel Sounds] : normal bowel sounds [No Joint Swelling] : no joint swelling [No Rash] : no rash [Normal Gait] : normal gait [Coordination Grossly Intact] : coordination grossly intact [No Focal Deficits] : no focal deficits [Speech Grossly Normal] : speech grossly normal [Memory Grossly Normal] : memory grossly normal [Normal Affect] : the affect was normal [Normal Mood] : the mood was normal [Normal Insight/Judgement] : insight and judgment were intact

## 2019-03-08 DIAGNOSIS — E03.9 HYPOTHYROIDISM, UNSPECIFIED: ICD-10-CM

## 2019-03-08 NOTE — ASSESSMENT
[FreeTextEntry1] : 69 yo F w/PMH of hypothyroidism, anxiety and pre-DM (A1c 5.7% in February 2018), presenting for acute visit for levothyroxine refill.\par \par #hypothyroidism\par -levothyroxine renewed for one month\par -check TSH to assess if current dosage is appropriate\par -rtc on March 20 for cpe\par \par dtatiana Matute

## 2019-03-08 NOTE — REVIEW OF SYSTEMS
[Fever] : no fever [Chills] : no chills [Discharge] : no discharge [Earache] : no earache [Chest Pain] : no chest pain [Palpitations] : no palpitations [Shortness Of Breath] : no shortness of breath [Abdominal Pain] : no abdominal pain [Nausea] : no nausea [Vomiting] : no vomiting [Dysuria] : no dysuria [Joint Pain] : no joint pain [Headache] : no headache

## 2019-03-08 NOTE — HISTORY OF PRESENT ILLNESS
[FreeTextEntry8] : 67 yo F w/PMH of hypothyroidism, anxiety and pre-DM (A1c 5.7% in February 2018), presenting for acute visit for levothyroxine refill. Pt endorses running low on levothyroxine and has only 2 pills left. She endorses feeling more cold and fatigue in the past 3 months. Pt denies cp, sob, depression.

## 2019-03-11 LAB — TSH SERPL-ACNC: 0.32 UIU/ML

## 2019-03-20 ENCOUNTER — APPOINTMENT (OUTPATIENT)
Dept: INTERNAL MEDICINE | Facility: CLINIC | Age: 69
End: 2019-03-20
Payer: MEDICARE

## 2019-03-20 ENCOUNTER — LABORATORY RESULT (OUTPATIENT)
Age: 69
End: 2019-03-20

## 2019-03-20 ENCOUNTER — OUTPATIENT (OUTPATIENT)
Dept: OUTPATIENT SERVICES | Facility: HOSPITAL | Age: 69
LOS: 1 days | End: 2019-03-20
Payer: MEDICARE

## 2019-03-20 DIAGNOSIS — H02.209 UNSPECIFIED LAGOPHTHALMOS UNSPECIFIED EYE, UNSPECIFIED EYELID: Chronic | ICD-10-CM

## 2019-03-20 DIAGNOSIS — H26.9 UNSPECIFIED CATARACT: Chronic | ICD-10-CM

## 2019-03-20 DIAGNOSIS — I10 ESSENTIAL (PRIMARY) HYPERTENSION: ICD-10-CM

## 2019-03-20 DIAGNOSIS — L30.9 DERMATITIS, UNSPECIFIED: ICD-10-CM

## 2019-03-20 PROCEDURE — 83036 HEMOGLOBIN GLYCOSYLATED A1C: CPT

## 2019-03-20 PROCEDURE — 80061 LIPID PANEL: CPT

## 2019-03-20 PROCEDURE — 80053 COMPREHEN METABOLIC PANEL: CPT

## 2019-03-20 PROCEDURE — G0463: CPT

## 2019-03-20 PROCEDURE — 99213 OFFICE O/P EST LOW 20 MIN: CPT | Mod: GE

## 2019-03-20 PROCEDURE — 85027 COMPLETE CBC AUTOMATED: CPT

## 2019-03-20 RX ORDER — GUAIFENESIN AND DEXTROMETHORPHAN HYDROBROMIDE 600; 30 MG/1; MG/1
30-600 TABLET, EXTENDED RELEASE ORAL
Qty: 14 | Refills: 0 | Status: COMPLETED | COMMUNITY
Start: 2017-11-10 | End: 2019-03-20

## 2019-03-20 RX ORDER — PREDNISOLONE ACETATE 10 MG/ML
1 SUSPENSION/ DROPS OPHTHALMIC 4 TIMES DAILY
Qty: 1 | Refills: 5 | Status: COMPLETED | COMMUNITY
Start: 2018-11-29 | End: 2019-03-20

## 2019-03-20 RX ORDER — FLUTICASONE PROPIONATE 50 UG/1
50 SPRAY, METERED NASAL TWICE DAILY
Qty: 1 | Refills: 0 | Status: COMPLETED | COMMUNITY
Start: 2018-12-12 | End: 2019-03-20

## 2019-03-20 RX ORDER — METRONIDAZOLE 10 MG/G
1 GEL TOPICAL DAILY
Qty: 1 | Refills: 0 | Status: COMPLETED | COMMUNITY
Start: 2017-04-07 | End: 2019-03-20

## 2019-03-20 RX ORDER — AZELASTINE HYDROCHLORIDE 137 UG/1
0.1 SPRAY, METERED NASAL TWICE DAILY
Qty: 1 | Refills: 0 | Status: COMPLETED | COMMUNITY
Start: 2017-07-13 | End: 2019-03-20

## 2019-03-20 RX ORDER — ACETAMINOPHEN, DEXTROMETHORPHAN HYDROBROMIDE, DOXYLAMINE SUCCINATE, AND PHENYLEPHRINE HYDROCHLORIDE 10; 12.5; 20; 65 MG/30ML; MG/30ML; MG/30ML; MG/30ML
5-6.25-10-325 SOLUTION ORAL
Qty: 1 | Refills: 0 | Status: COMPLETED | COMMUNITY
Start: 2017-11-10 | End: 2019-03-20

## 2019-03-20 RX ORDER — LATANOPROST/PF 0.005 %
0.01 DROPS OPHTHALMIC (EYE) DAILY
Qty: 1 | Refills: 5 | Status: COMPLETED | OUTPATIENT
Start: 2017-12-19 | End: 2019-03-20

## 2019-03-20 RX ORDER — FEXOFENADINE HCL 60 MG
TABLET ORAL
Refills: 0 | Status: COMPLETED | COMMUNITY
End: 2019-03-20

## 2019-03-21 DIAGNOSIS — L30.9 DERMATITIS, UNSPECIFIED: ICD-10-CM

## 2019-03-21 DIAGNOSIS — K59.00 CONSTIPATION, UNSPECIFIED: ICD-10-CM

## 2019-03-21 DIAGNOSIS — R73.03 PREDIABETES: ICD-10-CM

## 2019-03-21 DIAGNOSIS — E03.9 HYPOTHYROIDISM, UNSPECIFIED: ICD-10-CM

## 2019-03-21 LAB
ALBUMIN SERPL ELPH-MCNC: 4.3 G/DL — SIGNIFICANT CHANGE UP (ref 3.3–5)
ALP SERPL-CCNC: 93 U/L — SIGNIFICANT CHANGE UP (ref 40–120)
ALT FLD-CCNC: 12 U/L — SIGNIFICANT CHANGE UP (ref 10–45)
ANION GAP SERPL CALC-SCNC: 12 MMOL/L — SIGNIFICANT CHANGE UP (ref 5–17)
AST SERPL-CCNC: 21 U/L — SIGNIFICANT CHANGE UP (ref 10–40)
BILIRUB SERPL-MCNC: 0.2 MG/DL — SIGNIFICANT CHANGE UP (ref 0.2–1.2)
BUN SERPL-MCNC: 12 MG/DL — SIGNIFICANT CHANGE UP (ref 7–23)
CALCIUM SERPL-MCNC: 9.4 MG/DL — SIGNIFICANT CHANGE UP (ref 8.4–10.5)
CHLORIDE SERPL-SCNC: 103 MMOL/L — SIGNIFICANT CHANGE UP (ref 96–108)
CHOLEST SERPL-MCNC: 192 MG/DL — SIGNIFICANT CHANGE UP (ref 10–199)
CO2 SERPL-SCNC: 26 MMOL/L — SIGNIFICANT CHANGE UP (ref 22–31)
CREAT SERPL-MCNC: 0.65 MG/DL — SIGNIFICANT CHANGE UP (ref 0.5–1.3)
GLUCOSE SERPL-MCNC: 100 MG/DL — HIGH (ref 70–99)
HBA1C BLD-MCNC: 5.7 % — HIGH (ref 4–5.6)
HCT VFR BLD CALC: 41 % — SIGNIFICANT CHANGE UP (ref 34.5–45)
HDLC SERPL-MCNC: 60 MG/DL — SIGNIFICANT CHANGE UP
HGB BLD-MCNC: 12.7 G/DL — SIGNIFICANT CHANGE UP (ref 11.5–15.5)
LIPID PNL WITH DIRECT LDL SERPL: 116 MG/DL — HIGH
MCHC RBC-ENTMCNC: 29.5 PG — SIGNIFICANT CHANGE UP (ref 27–34)
MCHC RBC-ENTMCNC: 31 GM/DL — LOW (ref 32–36)
MCV RBC AUTO: 95.3 FL — SIGNIFICANT CHANGE UP (ref 80–100)
PLATELET # BLD AUTO: 306 K/UL — SIGNIFICANT CHANGE UP (ref 150–400)
POTASSIUM SERPL-MCNC: 4.3 MMOL/L — SIGNIFICANT CHANGE UP (ref 3.5–5.3)
POTASSIUM SERPL-SCNC: 4.3 MMOL/L — SIGNIFICANT CHANGE UP (ref 3.5–5.3)
PROT SERPL-MCNC: 7.6 G/DL — SIGNIFICANT CHANGE UP (ref 6–8.3)
RBC # BLD: 4.3 M/UL — SIGNIFICANT CHANGE UP (ref 3.8–5.2)
RBC # FLD: 13.1 % — SIGNIFICANT CHANGE UP (ref 10.3–14.5)
SODIUM SERPL-SCNC: 141 MMOL/L — SIGNIFICANT CHANGE UP (ref 135–145)
TOTAL CHOLESTEROL/HDL RATIO MEASUREMENT: 3.2 RATIO — LOW (ref 3.3–7.1)
TRIGL SERPL-MCNC: 82 MG/DL — SIGNIFICANT CHANGE UP (ref 10–149)
WBC # BLD: 6.71 K/UL — SIGNIFICANT CHANGE UP (ref 3.8–10.5)
WBC # FLD AUTO: 6.71 K/UL — SIGNIFICANT CHANGE UP (ref 3.8–10.5)

## 2019-03-21 NOTE — PHYSICAL EXAM
[No Acute Distress] : no acute distress [Well Nourished] : well nourished [Well Developed] : well developed [Well-Appearing] : well-appearing [Normal Sclera/Conjunctiva] : normal sclera/conjunctiva [PERRL] : pupils equal round and reactive to light [EOMI] : extraocular movements intact [Normal Oropharynx] : the oropharynx was normal [Normal Outer Ear/Nose] : the outer ears and nose were normal in appearance [No JVD] : no jugular venous distention [Supple] : supple [No Lymphadenopathy] : no lymphadenopathy [Thyroid Normal, No Nodules] : the thyroid was normal and there were no nodules present [No Respiratory Distress] : no respiratory distress  [No Accessory Muscle Use] : no accessory muscle use [Clear to Auscultation] : lungs were clear to auscultation bilaterally [Normal Rate] : normal rate  [Regular Rhythm] : with a regular rhythm [Normal S1, S2] : normal S1 and S2 [No Murmur] : no murmur heard [No Carotid Bruits] : no carotid bruits [No Abdominal Bruit] : a ~M bruit was not heard ~T in the abdomen [No Varicosities] : no varicosities [Pedal Pulses Present] : the pedal pulses are present [No Edema] : there was no peripheral edema [No Extremity Clubbing/Cyanosis] : no extremity clubbing/cyanosis [No Palpable Aorta] : no palpable aorta [Soft] : abdomen soft [Non Tender] : non-tender [Non-distended] : non-distended [No Masses] : no abdominal mass palpated [No HSM] : no HSM [Normal Bowel Sounds] : normal bowel sounds [Normal Posterior Cervical Nodes] : no posterior cervical lymphadenopathy [Normal Anterior Cervical Nodes] : no anterior cervical lymphadenopathy [No CVA Tenderness] : no CVA  tenderness [No Spinal Tenderness] : no spinal tenderness [No Joint Swelling] : no joint swelling [Grossly Normal Strength/Tone] : grossly normal strength/tone [No Rash] : no rash [Normal Gait] : normal gait [Coordination Grossly Intact] : coordination grossly intact [No Focal Deficits] : no focal deficits [Deep Tendon Reflexes (DTR)] : deep tendon reflexes were 2+ and symmetric [Normal Affect] : the affect was normal [Normal Insight/Judgement] : insight and judgment were intact [de-identified] : Patient with xerosis along arms, abdomen and neck with small approx 0.5mm papules scattered.

## 2019-03-21 NOTE — PLAN
[FreeTextEntry1] : Rash: likely due to xerosis given temporality of symptoms\par -educated patient about hygeine with emollients\par -will trial fexofenadine \par -aquaphor lotion\par \par Constipation: patient states she drinks about 3 cups of water a day, with balanced diet, colonoscopy wnl 4 years ago, however has some color change in stool\par -Fecal occult provided if positive send to GI for colonscopy\par -docusate, senna and polyethylene glycol\par \par HCM: RTC in 6 mo\par -cbc, cmp, lipids, a1c\par -mammogram provided\par -gyn referral for pap

## 2019-03-21 NOTE — HEALTH RISK ASSESSMENT
[Fair] : ~his/her~ current health as fair  [Good] : ~his/her~  mood as  good [No falls in past year] : Patient reported no falls in the past year [0] : 2) Feeling down, depressed, or hopeless: Not at all (0) [Patient reported mammogram was normal] : Patient reported mammogram was normal [Patient reported PAP Smear was normal] : Patient reported PAP Smear was normal [Patient reported colonoscopy was normal] : Patient reported colonoscopy was normal [Fully functional (bathing, dressing, toileting, transferring, walking, feeding)] : Fully functional (bathing, dressing, toileting, transferring, walking, feeding) [Fully functional (using the telephone, shopping, preparing meals, housekeeping, doing laundry, using] : Fully functional and needs no help or supervision to perform IADLs (using the telephone, shopping, preparing meals, housekeeping, doing laundry, using transportation, managing medications and managing finances) [] : No [MammogramDate] : 4/2018 [PapSmearDate] : 2015 [ColonoscopyDate] : 2015

## 2019-03-21 NOTE — HISTORY OF PRESENT ILLNESS
[de-identified] : 68 yo F w/ PMHx of hypothyroidism, anxiety and pre-DM (A1c 5.7% in February 2018), presenting for CPE.\par \par Rash: she states that she has had 3 mo history of individual red bumps on various parts of her body, neck, arm, legs abdomen that come and go and associated with dry skin and itching. She states she has had similar symptoms before, but has not lasted this long. She tried using aveno cream and baby oil, humidifier in her room without significant improvement. She has not tried other medications. \par \par Constipation: She has had fewer BM over the past month ranging between 2-3 a week with color described as coffee without change in caliber of the stool, abdominal pain, N/V. She tried docusate without improvement.

## 2019-03-21 NOTE — ASSESSMENT
[FreeTextEntry1] : 68 yo F w/ PMHx of hypothyroidism, anxiety and pre-DM (A1c 5.7% in February 2018), presenting for CPE.\par

## 2019-04-12 ENCOUNTER — APPOINTMENT (OUTPATIENT)
Dept: OPHTHALMOLOGY | Facility: CLINIC | Age: 69
End: 2019-04-12
Payer: MEDICARE

## 2019-04-12 PROCEDURE — ZZZZZ: CPT

## 2019-04-12 PROCEDURE — 92083 EXTENDED VISUAL FIELD XM: CPT

## 2019-04-12 PROCEDURE — 92133 CPTRZD OPH DX IMG PST SGM ON: CPT

## 2019-04-12 PROCEDURE — 92012 INTRM OPH EXAM EST PATIENT: CPT

## 2019-04-25 ENCOUNTER — FORM ENCOUNTER (OUTPATIENT)
Age: 69
End: 2019-04-25

## 2019-04-26 ENCOUNTER — APPOINTMENT (OUTPATIENT)
Dept: MAMMOGRAPHY | Facility: IMAGING CENTER | Age: 69
End: 2019-04-26
Payer: MEDICARE

## 2019-04-26 ENCOUNTER — OUTPATIENT (OUTPATIENT)
Dept: OUTPATIENT SERVICES | Facility: HOSPITAL | Age: 69
LOS: 1 days | End: 2019-04-26
Payer: MEDICARE

## 2019-04-26 ENCOUNTER — APPOINTMENT (OUTPATIENT)
Dept: RADIOLOGY | Facility: IMAGING CENTER | Age: 69
End: 2019-04-26
Payer: MEDICARE

## 2019-04-26 DIAGNOSIS — R92.2 INCONCLUSIVE MAMMOGRAM: ICD-10-CM

## 2019-04-26 DIAGNOSIS — H26.9 UNSPECIFIED CATARACT: Chronic | ICD-10-CM

## 2019-04-26 DIAGNOSIS — Z00.00 ENCOUNTER FOR GENERAL ADULT MEDICAL EXAMINATION WITHOUT ABNORMAL FINDINGS: ICD-10-CM

## 2019-04-26 DIAGNOSIS — H02.209 UNSPECIFIED LAGOPHTHALMOS UNSPECIFIED EYE, UNSPECIFIED EYELID: Chronic | ICD-10-CM

## 2019-04-26 PROCEDURE — 77080 DXA BONE DENSITY AXIAL: CPT | Mod: 26

## 2019-04-26 PROCEDURE — 77067 SCR MAMMO BI INCL CAD: CPT | Mod: 26

## 2019-04-26 PROCEDURE — 77080 DXA BONE DENSITY AXIAL: CPT

## 2019-04-26 PROCEDURE — 77063 BREAST TOMOSYNTHESIS BI: CPT | Mod: 26

## 2019-04-26 PROCEDURE — 77067 SCR MAMMO BI INCL CAD: CPT

## 2019-04-26 PROCEDURE — 77063 BREAST TOMOSYNTHESIS BI: CPT

## 2019-04-29 ENCOUNTER — CHART COPY (OUTPATIENT)
Age: 69
End: 2019-04-29

## 2019-04-29 PROBLEM — R92.2 BREAST DENSITY: Status: ACTIVE | Noted: 2019-04-29

## 2019-05-07 ENCOUNTER — LABORATORY RESULT (OUTPATIENT)
Age: 69
End: 2019-05-07

## 2019-05-08 ENCOUNTER — FORM ENCOUNTER (OUTPATIENT)
Age: 69
End: 2019-05-08

## 2019-05-08 ENCOUNTER — APPOINTMENT (OUTPATIENT)
Dept: PEDIATRIC ALLERGY IMMUNOLOGY | Facility: CLINIC | Age: 69
End: 2019-05-08
Payer: MEDICARE

## 2019-05-08 VITALS
WEIGHT: 131.99 LBS | SYSTOLIC BLOOD PRESSURE: 130 MMHG | BODY MASS INDEX: 24.92 KG/M2 | OXYGEN SATURATION: 99 % | HEIGHT: 60.98 IN | DIASTOLIC BLOOD PRESSURE: 81 MMHG | HEART RATE: 70 BPM

## 2019-05-08 DIAGNOSIS — L50.8 OTHER URTICARIA: ICD-10-CM

## 2019-05-08 DIAGNOSIS — Z91.013 ALLERGY TO SEAFOOD: ICD-10-CM

## 2019-05-08 DIAGNOSIS — J30.1 ALLERGIC RHINITIS DUE TO POLLEN: ICD-10-CM

## 2019-05-08 PROCEDURE — 99213 OFFICE O/P EST LOW 20 MIN: CPT | Mod: GC

## 2019-05-08 RX ORDER — FEXOFENADINE HCL 60 MG/1
60 TABLET, FILM COATED ORAL DAILY
Qty: 30 | Refills: 2 | Status: DISCONTINUED | COMMUNITY
Start: 2019-05-08 | End: 2019-05-08

## 2019-05-08 RX ORDER — FEXOFENADINE HCL 60 MG/1
60 TABLET, FILM COATED ORAL DAILY
Qty: 30 | Refills: 0 | Status: DISCONTINUED | COMMUNITY
Start: 2019-03-20 | End: 2019-05-08

## 2019-05-08 NOTE — REASON FOR VISIT
[Routine Follow-Up] : a routine follow-up visit for [Congestion] : congestion [Runny Nose] : runny nose [Hives] : hives

## 2019-05-09 ENCOUNTER — OUTPATIENT (OUTPATIENT)
Dept: OUTPATIENT SERVICES | Facility: HOSPITAL | Age: 69
LOS: 1 days | End: 2019-05-09
Payer: MEDICARE

## 2019-05-09 ENCOUNTER — APPOINTMENT (OUTPATIENT)
Dept: ULTRASOUND IMAGING | Facility: IMAGING CENTER | Age: 69
End: 2019-05-09
Payer: MEDICARE

## 2019-05-09 ENCOUNTER — APPOINTMENT (OUTPATIENT)
Dept: MAMMOGRAPHY | Facility: IMAGING CENTER | Age: 69
End: 2019-05-09
Payer: MEDICARE

## 2019-05-09 DIAGNOSIS — H26.9 UNSPECIFIED CATARACT: Chronic | ICD-10-CM

## 2019-05-09 DIAGNOSIS — R92.2 INCONCLUSIVE MAMMOGRAM: ICD-10-CM

## 2019-05-09 DIAGNOSIS — H02.209 UNSPECIFIED LAGOPHTHALMOS UNSPECIFIED EYE, UNSPECIFIED EYELID: Chronic | ICD-10-CM

## 2019-05-09 PROCEDURE — 77065 DX MAMMO INCL CAD UNI: CPT

## 2019-05-09 PROCEDURE — 77065 DX MAMMO INCL CAD UNI: CPT | Mod: 26,RT

## 2019-05-09 PROCEDURE — G0279: CPT | Mod: 26

## 2019-05-09 PROCEDURE — 76642 ULTRASOUND BREAST LIMITED: CPT

## 2019-05-09 PROCEDURE — 76642 ULTRASOUND BREAST LIMITED: CPT | Mod: 26,RT

## 2019-05-09 PROCEDURE — G0279: CPT

## 2019-05-10 PROBLEM — Z91.013 HISTORY OF ALLERGY TO SHELLFISH: Status: RESOLVED | Noted: 2017-06-02 | Resolved: 2019-05-10

## 2019-05-10 RX ORDER — EPINEPHRINE 0.3 MG/.3ML
0.3 INJECTION INTRAMUSCULAR
Qty: 1 | Refills: 3 | Status: DISCONTINUED | COMMUNITY
Start: 2017-06-02 | End: 2019-05-10

## 2019-05-10 NOTE — HISTORY OF PRESENT ILLNESS
[Molds] : molds [Trees] : trees [de-identified] : Patient (Pt) is 69 year old female with allergic rhinoconjunctivitis, radiocontrast media allergy, latex allergy and flushing who presents for follow-up. Pt previously seen in 2017 for workup of seafood and seasonal allergies. Patient has resolved food allergy.\par \par For past several months (~6 months), pt has noticed intermittent pruritic rash/hives that occurs "all over my body" (arms, legs, abdomen, face). These appear to come on randomly with no obvious trigger and resolve over a short period of time ranging from 2 hours to a few. She will use Marcela moisturizing cream on the lesions/rash, but has not used any topical steroids or PO medication. These lesions occur without any identifiable trigger or exacerbating agent. She denies any known exposure to latex or new detergents, lotions, or creams. She denies any NSAID use or new medications in general. Pt saw PCP in 3/2019 with similar symptoms. He recommended aquaphor and fexofenadine, which she has not been taking. Pt has had rhinorrhea, watery eyes, and dry cough for approx three weeks. She has not been using any nasal sprays or antihistamine eye drops. Pt uses timolol eye drops for glaucoma.  Pt has albuterol rescue inhaler for wheezing/SOB associated with her seasonal allergies which she uses approx once a month. Pt has a dermatologist who she last saw in 2017 and she has planned follow-up within the next month. \par \par Pt also c/o chills and intermittent left-sided neck pain/swelling that also appears to occur intermittently without identifiable trigger. These symptoms occur independently are not related to her urticaria/rash. She denies any shortness of breath or difficulty swallowing during these episodes. Pt reports being on a stable dose of levothyroxine for years. She denies heat/cold intolerance, recent changes in weight, diaphoresis, She has had a neck sonogram in the past year that was okay.\par \par Pt had previous skin tests positive for sensitization to fish and shellfish. She passed an in office challenge to salmon and tilapia in 2017 and since that time she has continued to eat fish and shellfish without restriction. Her symptoms are not correlated with any specific food consumption. Pt has had no interval allergic reaction to food and has had no interval use of her EpiPen. She has had no interval ED visits or hospitalizations.  [de-identified] : f [FreeTextEntry3] : No restrictions

## 2019-05-10 NOTE — ASSESSMENT
[FreeTextEntry1] : 69 year old female with hypothyroidism, glaucoma, seasonal allergic rhinitis, radiocontrast media allergy, and latex allergy who presents with recurrent urticaria. \par \par #URTICARIA\par - Patient's (Pt) symptoms are consistent with chronic urticaria. Specific trigger remains unclear at this time as pt denies any new medication, NSAID use, exposures. Most recent laboratory workup from 3/2019 show TSH wnl, CMP wnl, pre-diabetic A1C that has been downtrending/stable since 2015, unremarkable lipid panel. \par - CBC dating back to 2011 has shown persistently mildly low MCHC, the clinical significance of which is unclear. Possibly artifact as hyperglycemia at time of testing can cause a low MCHC, however this is often associated with concomitant increased MCV. Low MCHC is most commonly caused by iron deficiency anemia (deficient iron stores or functional/relative iron deficiency 2/2 sequestration). \par \par However MCV and MCH are normal, making iron deficiency anemia unlikely. Pt does not have signs/sx of active or occult bleeding, FOBT in 4/2019 was negative.\par - Pt has been on a stable dose of levothyroxine for years for hypothyroidism. Thyroid autoantibodies can be secondary cause of urticaria, will send serum studies today as we have no records in [employer]. Prior thyroid US wnl, however pt was without neck pain/swelling at that time. Will notify PMD of pt's complaints today. Counseled pt to call PMD when she is symptomatic to obtain repeat thyroid ultrasound.\par - Will send ESR and IgE autoantibody for ?autoimmune or inflammatory cause of urticaria\par - Will start fexofenadine 180 mg daily and monitor response to treatment. \par \par #ALLERGIC RHINITIS\par - Pt with prior skin testing positive for trees and mold, not currently taking any medication for allergy symptoms. \par - Information and education regarding environmental avoidance and control measures for environmental allergens provided.\par - Use Fluticasone and Fexofenadine as prescribed. Instructions on the proper use of nose sprays were provided.\par \par Tanja d/w Dr. Ram\par \par Shellie Duke MD, MPH\par PGY2 Internal Medicine

## 2019-05-10 NOTE — CONSULT LETTER
[Courtesy Letter:] : I had the pleasure of seeing your patient, [unfilled], in my office today. [Dear  ___] : Dear  [unfilled], [Please see my note below.] : Please see my note below. [Consult Closing:] : Thank you very much for allowing me to participate in the care of this patient.  If you have any questions, please do not hesitate to contact me. [Sincerely,] : Sincerely, [FreeTextEntry2] : Dr. Omar Alvarez Danya [FreeTextEntry3] : Shellie Duke MD\par PGY2\par \par Magalys Ram MD, FAAAAI, FACSWETAI\par Associate , \par Assistant Fellowship Training ,\par Director, [facility] and [facility]\par Division of Allergy and Immunology\par [facility]\par [facility]\par , Pediatrics and Medicine\par [facility] [employer] [facility]\par [address], [address]\[place] [address]\par [phone]\par

## 2019-05-10 NOTE — ADDENDUM
[FreeTextEntry1] : Elevated ESR- will reach out to PCP.  Suggest consideration of malignancy in differential- screening labs, and routine screening procedures should be updated.  Also added on EZRA to the labs.

## 2019-05-10 NOTE — PHYSICAL EXAM
[Alert] : alert [Well Nourished] : well nourished [Healthy Appearance] : healthy appearance [Normal Pupil & Iris Size/Symmetry] : normal pupil and iris size and symmetry [No Photophobia] : no photophobia [Sclera Not Icteric] : sclera not icteric [No Discharge] : no discharge [Normal TMs] : both tympanic membranes were normal [Normal Outer Ear/Nose] : the ears and nose were normal in appearance [Normal Lips/Tongue] : the lips and tongue were normal [Pharyngeal erythema] : pharyngeal erythema [Normal Rate and Effort] : normal respiratory rhythm and effort [No Retractions] : no retractions [No Crackles] : no crackles [Bilateral Audible Breath Sounds] : bilateral audible breath sounds [Normal Rate] : heart rate was normal  [Normal S1, S2] : normal S1 and S2 [No murmur] : no murmur [Regular Rhythm] : with a regular rhythm [Soft] : abdomen soft [Not Distended] : not distended [Not Tender] : non-tender [Normal Cervical Lymph Nodes] : cervical [No Skin Lesions] : no skin lesions [No Edema] : no edema [No clubbing] : no clubbing [Normal Mood] : mood was normal [No Cyanosis] : no cyanosis [Normal Affect] : affect was normal [Alert, Awake, Oriented as Age-Appropriate] : alert, awake, oriented as age appropriate [Boggy Nasal Turbinates] : no boggy and/or pale nasal turbinates [Xerosis] : no xerosis [de-identified] : solitary urticarial lesions on left anterior upper extremity and right wrist with associated erythema, no surrounding swelling or excoriations, blanching [de-identified] : thyroid tender to palpation on left side; no palpable mass

## 2019-05-20 ENCOUNTER — APPOINTMENT (OUTPATIENT)
Dept: OBGYN | Facility: CLINIC | Age: 69
End: 2019-05-20
Payer: MEDICARE

## 2019-05-20 ENCOUNTER — MESSAGE (OUTPATIENT)
Age: 69
End: 2019-05-20

## 2019-05-20 VITALS
BODY MASS INDEX: 25.91 KG/M2 | HEART RATE: 76 BPM | SYSTOLIC BLOOD PRESSURE: 139 MMHG | DIASTOLIC BLOOD PRESSURE: 78 MMHG | WEIGHT: 132 LBS | HEIGHT: 60 IN

## 2019-05-20 DIAGNOSIS — Z87.42 PERSONAL HISTORY OF OTHER DISEASES OF THE FEMALE GENITAL TRACT: ICD-10-CM

## 2019-05-20 DIAGNOSIS — Z87.2 PERSONAL HISTORY OF DISEASES OF THE SKIN AND SUBCUTANEOUS TISSUE: ICD-10-CM

## 2019-05-20 DIAGNOSIS — Z98.890 OTHER SPECIFIED POSTPROCEDURAL STATES: ICD-10-CM

## 2019-05-20 DIAGNOSIS — M75.22 BICIPITAL TENDINITIS, LEFT SHOULDER: ICD-10-CM

## 2019-05-20 DIAGNOSIS — M54.9 DORSALGIA, UNSPECIFIED: ICD-10-CM

## 2019-05-20 DIAGNOSIS — R10.2 PELVIC AND PERINEAL PAIN: ICD-10-CM

## 2019-05-20 DIAGNOSIS — Z87.440 PERSONAL HISTORY OF URINARY (TRACT) INFECTIONS: ICD-10-CM

## 2019-05-20 DIAGNOSIS — B97.89 ACUTE UPPER RESPIRATORY INFECTION, UNSPECIFIED: ICD-10-CM

## 2019-05-20 DIAGNOSIS — R31.29 OTHER MICROSCOPIC HEMATURIA: ICD-10-CM

## 2019-05-20 DIAGNOSIS — Z87.898 PERSONAL HISTORY OF OTHER SPECIFIED CONDITIONS: ICD-10-CM

## 2019-05-20 DIAGNOSIS — J06.9 ACUTE UPPER RESPIRATORY INFECTION, UNSPECIFIED: ICD-10-CM

## 2019-05-20 DIAGNOSIS — N89.9 NONINFLAMMATORY DISORDER OF VAGINA, UNSPECIFIED: ICD-10-CM

## 2019-05-20 PROCEDURE — 99397 PER PM REEVAL EST PAT 65+ YR: CPT

## 2019-05-20 NOTE — HISTORY OF PRESENT ILLNESS
[1 Year Ago] : 1 year ago [Good] : being in good health [Healthy Diet] : a healthy diet [Regular Exercise] : regular exercise [Last Mammogram ___] : Last Mammogram was [unfilled] [Last Bone Density ___] : Last bone density studies [unfilled] [Last Pap ___] : Last cervical pap smear was [unfilled] [Last Colonoscopy ___] : Last colonoscopy [unfilled] [Postmenopausal] : is postmenopausal [Pregnancy History] : pregnancy history: [Up to Date] : up to date with ~his/her~ immunizations [Weight Concerns] : no concerns with her weight [Menstrual Problems] : reports normal menses

## 2019-05-20 NOTE — CHIEF COMPLAINT
[Initial Visit] : initial GYN visit [FreeTextEntry1] : 70YO  [id number] s/p supracerv vag hyst, bladder suspension (cervix and ovaries in) for severe prolapse presents for checkup without complaints.

## 2019-05-28 LAB — CYTOLOGY CVX/VAG DOC THIN PREP: NORMAL

## 2019-06-07 ENCOUNTER — RX RENEWAL (OUTPATIENT)
Age: 69
End: 2019-06-07

## 2019-07-08 ENCOUNTER — MEDICATION RENEWAL (OUTPATIENT)
Age: 69
End: 2019-07-08

## 2019-07-22 ENCOUNTER — APPOINTMENT (OUTPATIENT)
Dept: RHEUMATOLOGY | Facility: CLINIC | Age: 69
End: 2019-07-22
Payer: MEDICARE

## 2019-07-22 VITALS
SYSTOLIC BLOOD PRESSURE: 129 MMHG | OXYGEN SATURATION: 98 % | HEART RATE: 74 BPM | DIASTOLIC BLOOD PRESSURE: 81 MMHG | TEMPERATURE: 98.2 F | RESPIRATION RATE: 14 BRPM

## 2019-07-22 DIAGNOSIS — Z82.61 FAMILY HISTORY OF ARTHRITIS: ICD-10-CM

## 2019-07-22 PROCEDURE — 99205 OFFICE O/P NEW HI 60 MIN: CPT

## 2019-07-24 ENCOUNTER — APPOINTMENT (OUTPATIENT)
Dept: INTERNAL MEDICINE | Facility: CLINIC | Age: 69
End: 2019-07-24
Payer: MEDICARE

## 2019-07-24 ENCOUNTER — OUTPATIENT (OUTPATIENT)
Dept: OUTPATIENT SERVICES | Facility: HOSPITAL | Age: 69
LOS: 1 days | End: 2019-07-24
Payer: MEDICARE

## 2019-07-24 VITALS
SYSTOLIC BLOOD PRESSURE: 122 MMHG | HEIGHT: 60 IN | DIASTOLIC BLOOD PRESSURE: 70 MMHG | WEIGHT: 130 LBS | BODY MASS INDEX: 25.52 KG/M2

## 2019-07-24 DIAGNOSIS — H26.9 UNSPECIFIED CATARACT: Chronic | ICD-10-CM

## 2019-07-24 DIAGNOSIS — I10 ESSENTIAL (PRIMARY) HYPERTENSION: ICD-10-CM

## 2019-07-24 DIAGNOSIS — R94.5 ABNORMAL RESULTS OF LIVER FUNCTION STUDIES: ICD-10-CM

## 2019-07-24 DIAGNOSIS — R05 COUGH: ICD-10-CM

## 2019-07-24 DIAGNOSIS — Z78.9 OTHER SPECIFIED HEALTH STATUS: ICD-10-CM

## 2019-07-24 DIAGNOSIS — J01.10 ACUTE FRONTAL SINUSITIS, UNSPECIFIED: ICD-10-CM

## 2019-07-24 DIAGNOSIS — H02.209 UNSPECIFIED LAGOPHTHALMOS UNSPECIFIED EYE, UNSPECIFIED EYELID: Chronic | ICD-10-CM

## 2019-07-24 DIAGNOSIS — J45.909 UNSPECIFIED ASTHMA, UNCOMPLICATED: ICD-10-CM

## 2019-07-24 DIAGNOSIS — R09.89 OTHER SPECIFIED SYMPTOMS AND SIGNS INVOLVING THE CIRCULATORY AND RESPIRATORY SYSTEMS: ICD-10-CM

## 2019-07-24 DIAGNOSIS — Z01.419 ENCOUNTER FOR GYNECOLOGICAL EXAMINATION (GENERAL) (ROUTINE) W/OUT ABNORMAL FINDINGS: ICD-10-CM

## 2019-07-24 DIAGNOSIS — R89.9 UNSPECIFIED ABNORMAL FINDING IN SPECIMENS FROM OTHER ORGANS, SYSTEMS AND TISSUES: ICD-10-CM

## 2019-07-24 PROCEDURE — G0463: CPT

## 2019-07-24 PROCEDURE — 99213 OFFICE O/P EST LOW 20 MIN: CPT | Mod: GE

## 2019-07-24 NOTE — PHYSICAL EXAM
[No Acute Distress] : no acute distress [Well Nourished] : well nourished [Well Developed] : well developed [Well-Appearing] : well-appearing [Normal Sclera/Conjunctiva] : normal sclera/conjunctiva [EOMI] : extraocular movements intact [Normal Outer Ear/Nose] : the outer ears and nose were normal in appearance [Normal Oropharynx] : the oropharynx was normal [No Lymphadenopathy] : no lymphadenopathy [Supple] : supple [No Respiratory Distress] : no respiratory distress  [No Accessory Muscle Use] : no accessory muscle use [Clear to Auscultation] : lungs were clear to auscultation bilaterally [Normal Rate] : normal rate  [Regular Rhythm] : with a regular rhythm [Normal S1, S2] : normal S1 and S2 [No Murmur] : no murmur heard [Pedal Pulses Present] : the pedal pulses are present [No Edema] : there was no peripheral edema [No Extremity Clubbing/Cyanosis] : no extremity clubbing/cyanosis [Soft] : abdomen soft [Non Tender] : non-tender [Non-distended] : non-distended [No Masses] : no abdominal mass palpated [No HSM] : no HSM [Normal Bowel Sounds] : normal bowel sounds [Normal Posterior Cervical Nodes] : no posterior cervical lymphadenopathy [Normal Anterior Cervical Nodes] : no anterior cervical lymphadenopathy [No Spinal Tenderness] : no spinal tenderness [No Joint Swelling] : no joint swelling [Grossly Normal Strength/Tone] : grossly normal strength/tone [No Rash] : no rash [Coordination Grossly Intact] : coordination grossly intact [No Focal Deficits] : no focal deficits [Normal Gait] : normal gait [Normal Affect] : the affect was normal [Alert and Oriented x3] : oriented to person, place, and time [Normal Insight/Judgement] : insight and judgment were intact

## 2019-07-26 PROBLEM — R05 COUGH: Noted: 2018-12-12

## 2019-07-26 PROBLEM — R94.5 ABNORMAL LFTS (LIVER FUNCTION TESTS): Status: RESOLVED | Noted: 2018-03-19 | Resolved: 2019-07-26

## 2019-07-26 PROBLEM — R89.9 ABNORMAL LABORATORY TEST: Status: RESOLVED | Noted: 2019-05-20 | Resolved: 2019-07-26

## 2019-07-26 PROBLEM — J45.909 ASTHMA: Status: ACTIVE | Noted: 2019-07-26

## 2019-07-26 PROBLEM — J01.10 ACUTE NON-RECURRENT FRONTAL SINUSITIS: Status: RESOLVED | Noted: 2018-11-15 | Resolved: 2019-07-26

## 2019-07-26 NOTE — HEALTH RISK ASSESSMENT
[No] : In the past 12 months have you used drugs other than those required for medical reasons? No [] : No [de-identified] : 1 drink a month or 2 months

## 2019-07-26 NOTE — HISTORY OF PRESENT ILLNESS
[FreeTextEntry8] : 70 yo F w/ PMHx of hypothyroidism, anxiety and pre-DM (A1c 5.7% in February 2018) presenting for acute visit for cough and chills since end of June. A/w body aches, sore throat, SOB, dry cough. At night she feels like throat is tight and closed, has dry mouth. States her nephew was also sick with similar URI sx but his symptoms have already improved. She has tried Nyquil and home remedies. Has family history of pulmonary fibrosis (in her siblings). Was seen recently by rheum on Monday. Had lots of blood work done and CXR ordered.  Also complaining of chronic constipation, had colonoscopy done in 2015, with 2 sessile polyps, diverticulosis. Has been using stool softeners but makes her BMs watery. Also having very thin but brown stools. Had EGD and colonoscopy ~10 years ago, normal. \par The patient reports that she has had intermittent wheezing.

## 2019-07-26 NOTE — REVIEW OF SYSTEMS
[Chills] : chills [Sore Throat] : sore throat [Shortness Of Breath] : shortness of breath [Cough] : cough [Constipation] : constipation [Diarrhea] : diarrhea [Itching] : Itching [Fever] : no fever [Pain] : no pain [Vision Problems] : no vision problems [Chest Pain] : no chest pain [Wheezing] : no wheezing [Dysuria] : no dysuria [Joint Pain] : no joint pain [Skin Rash] : no skin rash [Headache] : no headache [Insomnia] : no insomnia [FreeTextEntry5] : congestion [FreeTextEntry8] : feels retention

## 2019-07-28 ENCOUNTER — FORM ENCOUNTER (OUTPATIENT)
Age: 69
End: 2019-07-28

## 2019-07-29 ENCOUNTER — APPOINTMENT (OUTPATIENT)
Dept: RADIOLOGY | Facility: CLINIC | Age: 69
End: 2019-07-29
Payer: MEDICARE

## 2019-07-29 ENCOUNTER — OUTPATIENT (OUTPATIENT)
Dept: OUTPATIENT SERVICES | Facility: HOSPITAL | Age: 69
LOS: 1 days | End: 2019-07-29
Payer: MEDICARE

## 2019-07-29 DIAGNOSIS — H02.209 UNSPECIFIED LAGOPHTHALMOS UNSPECIFIED EYE, UNSPECIFIED EYELID: Chronic | ICD-10-CM

## 2019-07-29 DIAGNOSIS — H26.9 UNSPECIFIED CATARACT: Chronic | ICD-10-CM

## 2019-07-29 DIAGNOSIS — M25.50 PAIN IN UNSPECIFIED JOINT: ICD-10-CM

## 2019-07-29 DIAGNOSIS — R05 COUGH: ICD-10-CM

## 2019-07-29 PROCEDURE — 71048 X-RAY EXAM CHEST 4+ VIEWS: CPT | Mod: 26

## 2019-07-29 PROCEDURE — 73030 X-RAY EXAM OF SHOULDER: CPT | Mod: 26,50

## 2019-07-29 PROCEDURE — 73562 X-RAY EXAM OF KNEE 3: CPT

## 2019-07-29 PROCEDURE — 73562 X-RAY EXAM OF KNEE 3: CPT | Mod: 26,50

## 2019-07-29 PROCEDURE — 73030 X-RAY EXAM OF SHOULDER: CPT

## 2019-07-29 PROCEDURE — 71048 X-RAY EXAM CHEST 4+ VIEWS: CPT

## 2019-08-01 DIAGNOSIS — K59.00 CONSTIPATION, UNSPECIFIED: ICD-10-CM

## 2019-08-01 DIAGNOSIS — R05 COUGH: ICD-10-CM

## 2019-08-01 DIAGNOSIS — J45.909 UNSPECIFIED ASTHMA, UNCOMPLICATED: ICD-10-CM

## 2019-08-16 LAB
25(OH)D3 SERPL-MCNC: 29 NG/ML
ALBUMIN SERPL ELPH-MCNC: 4.3 G/DL
ALP BLD-CCNC: 89 U/L
ALT SERPL-CCNC: 8 U/L
ANA PAT FLD IF-IMP: ABNORMAL
ANA SER IF-ACNC: ABNORMAL
ANION GAP SERPL CALC-SCNC: 13 MMOL/L
APPEARANCE: CLEAR
APTT BLD: 32 SEC
AST SERPL-CCNC: 19 U/L
B2 GLYCOPROT1 AB SER QL: NEGATIVE
BACTERIA: NEGATIVE
BASOPHILS # BLD AUTO: 0.04 K/UL
BASOPHILS NFR BLD AUTO: 0.6 %
BILIRUB SERPL-MCNC: 0.2 MG/DL
BILIRUBIN URINE: NEGATIVE
BLOOD URINE: ABNORMAL
BUN SERPL-MCNC: 11 MG/DL
CALCIUM SERPL-MCNC: 9.1 MG/DL
CARDIOLIPIN AB SER IA-ACNC: NEGATIVE
CCP AB SER IA-ACNC: <8 UNITS
CENTROMERE IGG SER-ACNC: <0.2 CD:130001892
CHLORIDE SERPL-SCNC: 102 MMOL/L
CO2 SERPL-SCNC: 24 MMOL/L
COLOR: NORMAL
CREAT SERPL-MCNC: 0.7 MG/DL
CREAT SPEC-SCNC: 47 MG/DL
CREAT/PROT UR: 0.1 RATIO
CRP SERPL-MCNC: 0.41 MG/DL
DSDNA AB SER-ACNC: 42 IU/ML
ENA RNP AB SER IA-ACNC: 0.3 AL
ENA SCL70 IGG SER IA-ACNC: <0.2 AL
ENA SM AB SER IA-ACNC: <0.2 AL
ENA SS-A AB SER IA-ACNC: <0.2 AL
ENA SS-B AB SER IA-ACNC: <0.2 AL
EOSINOPHIL # BLD AUTO: 0.18 K/UL
EOSINOPHIL NFR BLD AUTO: 2.7 %
ERYTHROCYTE [SEDIMENTATION RATE] IN BLOOD BY WESTERGREN METHOD: 25 MM/HR
GLUCOSE QUALITATIVE U: NEGATIVE
GLUCOSE SERPL-MCNC: 86 MG/DL
HCT VFR BLD CALC: 37.1 %
HGB BLD-MCNC: 11.5 G/DL
HYALINE CASTS: 1 /LPF
IMM GRANULOCYTES NFR BLD AUTO: 0.2 %
KETONES URINE: NEGATIVE
LEUKOCYTE ESTERASE URINE: ABNORMAL
LYMPHOCYTES # BLD AUTO: 2.24 K/UL
LYMPHOCYTES NFR BLD AUTO: 33.9 %
MAN DIFF?: NORMAL
MCHC RBC-ENTMCNC: 29 PG
MCHC RBC-ENTMCNC: 31 GM/DL
MCV RBC AUTO: 93.5 FL
MICROSCOPIC-UA: NORMAL
MONOCYTES # BLD AUTO: 0.66 K/UL
MONOCYTES NFR BLD AUTO: 10 %
NEUTROPHILS # BLD AUTO: 3.47 K/UL
NEUTROPHILS NFR BLD AUTO: 52.6 %
NITRITE URINE: NEGATIVE
PH URINE: 5.5
PLATELET # BLD AUTO: 334 K/UL
POTASSIUM SERPL-SCNC: 4.9 MMOL/L
PROT SERPL-MCNC: 7.8 G/DL
PROT UR-MCNC: 6 MG/DL
PROTEIN URINE: NEGATIVE
RBC # BLD: 3.97 M/UL
RBC # FLD: 13 %
RED BLOOD CELLS URINE: 2 /HPF
RF+CCP IGG SER-IMP: NEGATIVE
RHEUMATOID FACT SER QL: 10 IU/ML
RNA POLYMERASE III IGG: 12.4 U
SODIUM SERPL-SCNC: 139 MMOL/L
SPECIFIC GRAVITY URINE: 1.01
SQUAMOUS EPITHELIAL CELLS: 5 /HPF
THYROGLOB AB SERPL-ACNC: <20 IU/ML
THYROPEROXIDASE AB SERPL IA-ACNC: <10 IU/ML
TSH SERPL-ACNC: 0.84 UIU/ML
UROBILINOGEN URINE: NORMAL
WBC # FLD AUTO: 6.6 K/UL
WHITE BLOOD CELLS URINE: 14 /HPF

## 2019-09-23 ENCOUNTER — MEDICATION RENEWAL (OUTPATIENT)
Age: 69
End: 2019-09-23

## 2019-09-23 ENCOUNTER — LABORATORY RESULT (OUTPATIENT)
Age: 69
End: 2019-09-23

## 2019-09-23 ENCOUNTER — APPOINTMENT (OUTPATIENT)
Dept: RHEUMATOLOGY | Facility: CLINIC | Age: 69
End: 2019-09-23
Payer: MEDICARE

## 2019-09-23 VITALS
DIASTOLIC BLOOD PRESSURE: 81 MMHG | HEIGHT: 60 IN | TEMPERATURE: 97.8 F | SYSTOLIC BLOOD PRESSURE: 146 MMHG | OXYGEN SATURATION: 99 % | BODY MASS INDEX: 25.13 KG/M2 | HEART RATE: 71 BPM | WEIGHT: 128 LBS

## 2019-09-23 DIAGNOSIS — M19.012 PRIMARY OSTEOARTHRITIS, RIGHT SHOULDER: ICD-10-CM

## 2019-09-23 DIAGNOSIS — M19.011 PRIMARY OSTEOARTHRITIS, RIGHT SHOULDER: ICD-10-CM

## 2019-09-23 DIAGNOSIS — M17.0 BILATERAL PRIMARY OSTEOARTHRITIS OF KNEE: ICD-10-CM

## 2019-09-23 PROCEDURE — 99214 OFFICE O/P EST MOD 30 MIN: CPT

## 2019-09-24 LAB
APPEARANCE: CLEAR
BACTERIA: NEGATIVE
BILIRUBIN URINE: NEGATIVE
BLOOD URINE: ABNORMAL
COLOR: COLORLESS
CREAT SPEC-SCNC: 20 MG/DL
CREAT/PROT UR: NORMAL RATIO
GLUCOSE QUALITATIVE U: NEGATIVE
HYALINE CASTS: 0 /LPF
KETONES URINE: NEGATIVE
LEUKOCYTE ESTERASE URINE: NEGATIVE
MICROSCOPIC-UA: NORMAL
NITRITE URINE: NEGATIVE
PH URINE: 6.5
PROT UR-MCNC: <4 MG/DL
PROTEIN URINE: NEGATIVE
RED BLOOD CELLS URINE: 0 /HPF
SPECIFIC GRAVITY URINE: 1.01
SQUAMOUS EPITHELIAL CELLS: 1 /HPF
UROBILINOGEN URINE: NORMAL
WHITE BLOOD CELLS URINE: 1 /HPF

## 2019-09-26 LAB — DSDNA AB SER-ACNC: 27 IU/ML

## 2019-10-21 ENCOUNTER — APPOINTMENT (OUTPATIENT)
Dept: PULMONOLOGY | Facility: CLINIC | Age: 69
End: 2019-10-21
Payer: MEDICARE

## 2019-10-21 VITALS
TEMPERATURE: 97.5 F | HEIGHT: 61.42 IN | SYSTOLIC BLOOD PRESSURE: 124 MMHG | BODY MASS INDEX: 24.04 KG/M2 | DIASTOLIC BLOOD PRESSURE: 77 MMHG | WEIGHT: 129 LBS | HEART RATE: 70 BPM | OXYGEN SATURATION: 94 % | RESPIRATION RATE: 16 BRPM

## 2019-10-21 PROCEDURE — ZZZZZ: CPT

## 2019-10-21 PROCEDURE — 94729 DIFFUSING CAPACITY: CPT

## 2019-10-21 PROCEDURE — 94726 PLETHYSMOGRAPHY LUNG VOLUMES: CPT

## 2019-10-21 PROCEDURE — 94060 EVALUATION OF WHEEZING: CPT

## 2019-10-21 PROCEDURE — 99204 OFFICE O/P NEW MOD 45 MIN: CPT | Mod: 25

## 2019-10-21 NOTE — ASSESSMENT
[FreeTextEntry1] : 1. Recurrent cough: No evidence for ILD on CXR or PFTs. Probably related to post nasal drip. ?Reflux. Advised to take flonase regularly for one month and observe.

## 2019-10-21 NOTE — REVIEW OF SYSTEMS
[Chills] : chills [Dry Mouth] : dry mouth [As Noted in HPI] : as noted in HPI [Heartburn] : heartburn [Arthralgias] : arthralgias [Negative] : Dermatologic

## 2019-10-21 NOTE — PHYSICAL EXAM
[General Appearance - Well Developed] : well developed [Normal Appearance] : normal appearance [Well Groomed] : well groomed [General Appearance - Well Nourished] : well nourished [No Deformities] : no deformities [General Appearance - In No Acute Distress] : no acute distress [Normal Conjunctiva] : the conjunctiva exhibited no abnormalities [Eyelids - No Xanthelasma] : the eyelids demonstrated no xanthelasmas [Normal Oropharynx] : normal oropharynx [Neck Appearance] : the appearance of the neck was normal [Neck Cervical Mass (___cm)] : no neck mass was observed [Jugular Venous Distention Increased] : there was no jugular-venous distention [Thyroid Nodule] : there were no palpable thyroid nodules [Thyroid Diffuse Enlargement] : the thyroid was not enlarged [Heart Rate And Rhythm] : heart rate and rhythm were normal [Heart Sounds] : normal S1 and S2 [Murmurs] : no murmurs present [Respiration, Rhythm And Depth] : normal respiratory rhythm and effort [Exaggerated Use Of Accessory Muscles For Inspiration] : no accessory muscle use [Auscultation Breath Sounds / Voice Sounds] : lungs were clear to auscultation bilaterally [Abdomen Soft] : soft [Abdomen Tenderness] : non-tender [Abdomen Mass (___ Cm)] : no abdominal mass palpated [Gait - Sufficient For Exercise Testing] : the gait was sufficient for exercise testing [Abnormal Walk] : normal gait [Nail Clubbing] : no clubbing of the fingernails [Cyanosis, Localized] : no localized cyanosis [Petechial Hemorrhages (___cm)] : no petechial hemorrhages [] : no ischemic changes [Deep Tendon Reflexes (DTR)] : deep tendon reflexes were 2+ and symmetric [No Focal Deficits] : no focal deficits [Sensation] : the sensory exam was normal to light touch and pinprick

## 2019-10-21 NOTE — HISTORY OF PRESENT ILLNESS
[FreeTextEntry1] : Patient notes that 3-4 times a year, she develops a URI, which is followed by a severe cough. She notes that the cough is mostly nonproductive, but occasionally productive of green phlegm. Also notes severe dry throat, and the sensation that her throat will close. SHe states that she also has problem swallowing, and she saw ENT doc about 3 years ago for these problems.\par \par Recently saw rheumatologist for abnormal labs, no definite autoimmune disease\par \par Patient notes that pulmonary fibrosis runs in family, at least three close family members. \par \par Patient endorses post nasal dirp and acid indigestion. Uses flonase occasionally, none in the past two weeks\par \par

## 2019-11-06 ENCOUNTER — NON-APPOINTMENT (OUTPATIENT)
Age: 69
End: 2019-11-06

## 2019-11-06 ENCOUNTER — APPOINTMENT (OUTPATIENT)
Dept: OPHTHALMOLOGY | Facility: CLINIC | Age: 69
End: 2019-11-06
Payer: MEDICARE

## 2019-11-06 PROCEDURE — 92134 CPTRZ OPH DX IMG PST SGM RTA: CPT

## 2019-11-06 PROCEDURE — 92014 COMPRE OPH EXAM EST PT 1/>: CPT

## 2019-11-14 ENCOUNTER — RX RENEWAL (OUTPATIENT)
Age: 69
End: 2019-11-14

## 2019-11-18 ENCOUNTER — RX RENEWAL (OUTPATIENT)
Age: 69
End: 2019-11-18

## 2020-01-14 ENCOUNTER — OUTPATIENT (OUTPATIENT)
Dept: OUTPATIENT SERVICES | Facility: HOSPITAL | Age: 70
LOS: 1 days | End: 2020-01-14
Payer: MEDICARE

## 2020-01-14 ENCOUNTER — RESULT CHARGE (OUTPATIENT)
Age: 70
End: 2020-01-14

## 2020-01-14 ENCOUNTER — LABORATORY RESULT (OUTPATIENT)
Age: 70
End: 2020-01-14

## 2020-01-14 ENCOUNTER — APPOINTMENT (OUTPATIENT)
Dept: INTERNAL MEDICINE | Facility: CLINIC | Age: 70
End: 2020-01-14
Payer: MEDICARE

## 2020-01-14 VITALS
OXYGEN SATURATION: 99 % | BODY MASS INDEX: 23.3 KG/M2 | WEIGHT: 125 LBS | TEMPERATURE: 98.4 F | SYSTOLIC BLOOD PRESSURE: 100 MMHG | DIASTOLIC BLOOD PRESSURE: 60 MMHG | HEART RATE: 79 BPM

## 2020-01-14 DIAGNOSIS — H26.9 UNSPECIFIED CATARACT: Chronic | ICD-10-CM

## 2020-01-14 DIAGNOSIS — I10 ESSENTIAL (PRIMARY) HYPERTENSION: ICD-10-CM

## 2020-01-14 DIAGNOSIS — Z87.09 PERSONAL HISTORY OF OTHER DISEASES OF THE RESPIRATORY SYSTEM: ICD-10-CM

## 2020-01-14 DIAGNOSIS — H02.209 UNSPECIFIED LAGOPHTHALMOS UNSPECIFIED EYE, UNSPECIFIED EYELID: Chronic | ICD-10-CM

## 2020-01-14 LAB
FLUAV SPEC QL CULT: NEGATIVE
FLUBV AG SPEC QL IA: NEGATIVE
S PYO AG SPEC QL IA: NEGATIVE

## 2020-01-14 PROCEDURE — 99214 OFFICE O/P EST MOD 30 MIN: CPT | Mod: GC

## 2020-01-14 PROCEDURE — G0463: CPT

## 2020-01-14 RX ORDER — SENNOSIDES 8.6 MG TABLETS 8.6 MG/1
8.6 TABLET ORAL
Qty: 30 | Refills: 0 | Status: DISCONTINUED | COMMUNITY
Start: 2019-03-20 | End: 2020-01-14

## 2020-01-14 NOTE — PHYSICAL EXAM
[Normal TMs] : both tympanic membranes were normal [Normal] : normal rate, regular rhythm, normal S1 and S2 and no murmur heard [de-identified] : Erythematous oropharynx, no exudates [de-identified] : Enlarged bilateral cervical LAD (one 1cm L anterior cervical LN with mild tenderness)

## 2020-01-14 NOTE — HISTORY OF PRESENT ILLNESS
[FreeTextEntry8] : Started on 12/26 started with cough\par Very painful throat. Chills, no fever. \par Lost voice on 1/12. Had vomiting last week for one day and now resolved. No diarrhea. \par Also with decreased appetite. No muscle aches - just some back pain. \par Had headache for two days.\par \par Has tried multiple OTC meds -- mucinex, delsym chest congestion.\par Able to ambulate without SOB.\par Nephew was sick 1 week ago with URI sx.

## 2020-01-14 NOTE — ASSESSMENT
[FreeTextEntry1] : 68 y/o F with h/o hypothyroidism, anxiety, preDM, asthma here for acute visit for cough/chills.\par Has had subacute history of cough since 12/26 with new onset of symptoms of sore throat/hoarseness/chills since 1/12.\par Likely viral infection. Rapid strep and rapid flu negative.\par Of note, had similar symptoms in June/July 2019.\par - Would do supportive care - chloraseptic spray, flonase, tessalon\par - Encouraged hydration\par \par RTC if sx persist or worsen.

## 2020-01-14 NOTE — REVIEW OF SYSTEMS
[Chills] : chills [Nasal Discharge] : nasal discharge [Hoarseness] : hoarseness [Sore Throat] : sore throat [Cough] : cough [Diarrhea] : diarrhea [Vomiting] : vomiting [Negative] : Cardiovascular [Fever] : no fever [Fatigue] : no fatigue [Shortness Of Breath] : no shortness of breath [Night Sweats] : no night sweats [Wheezing] : no wheezing

## 2020-01-15 DIAGNOSIS — J02.9 ACUTE PHARYNGITIS, UNSPECIFIED: ICD-10-CM

## 2020-03-06 ENCOUNTER — NON-APPOINTMENT (OUTPATIENT)
Age: 70
End: 2020-03-06

## 2020-03-06 ENCOUNTER — APPOINTMENT (OUTPATIENT)
Dept: OPHTHALMOLOGY | Facility: CLINIC | Age: 70
End: 2020-03-06
Payer: MEDICARE

## 2020-03-06 PROCEDURE — 92012 INTRM OPH EXAM EST PATIENT: CPT

## 2020-04-03 PROBLEM — K62.1 SESSILE RECTAL POLYP: Status: ACTIVE | Noted: 2020-04-03

## 2020-04-17 ENCOUNTER — APPOINTMENT (OUTPATIENT)
Dept: INTERNAL MEDICINE | Facility: CLINIC | Age: 70
End: 2020-04-17

## 2020-06-29 ENCOUNTER — LABORATORY RESULT (OUTPATIENT)
Age: 70
End: 2020-06-29

## 2020-06-29 ENCOUNTER — OUTPATIENT (OUTPATIENT)
Dept: OUTPATIENT SERVICES | Facility: HOSPITAL | Age: 70
LOS: 1 days | End: 2020-06-29
Payer: MEDICARE

## 2020-06-29 ENCOUNTER — APPOINTMENT (OUTPATIENT)
Dept: INTERNAL MEDICINE | Facility: CLINIC | Age: 70
End: 2020-06-29
Payer: MEDICARE

## 2020-06-29 VITALS
SYSTOLIC BLOOD PRESSURE: 110 MMHG | WEIGHT: 135 LBS | BODY MASS INDEX: 24.84 KG/M2 | DIASTOLIC BLOOD PRESSURE: 70 MMHG | HEIGHT: 62 IN

## 2020-06-29 DIAGNOSIS — Z00.00 ENCOUNTER FOR GENERAL ADULT MEDICAL EXAMINATION WITHOUT ABNORMAL FINDINGS: ICD-10-CM

## 2020-06-29 DIAGNOSIS — M25.561 PAIN IN RIGHT KNEE: ICD-10-CM

## 2020-06-29 DIAGNOSIS — H26.9 UNSPECIFIED CATARACT: Chronic | ICD-10-CM

## 2020-06-29 DIAGNOSIS — H40.003 PREGLAUCOMA, UNSPECIFIED, BILATERAL: ICD-10-CM

## 2020-06-29 DIAGNOSIS — G89.29 PAIN IN RIGHT KNEE: ICD-10-CM

## 2020-06-29 DIAGNOSIS — I10 ESSENTIAL (PRIMARY) HYPERTENSION: ICD-10-CM

## 2020-06-29 DIAGNOSIS — R73.03 PREDIABETES.: ICD-10-CM

## 2020-06-29 DIAGNOSIS — R73.03 PREDIABETES: ICD-10-CM

## 2020-06-29 DIAGNOSIS — F32.9 MAJOR DEPRESSIVE DISORDER, SINGLE EPISODE, UNSPECIFIED: ICD-10-CM

## 2020-06-29 DIAGNOSIS — H02.209 UNSPECIFIED LAGOPHTHALMOS UNSPECIFIED EYE, UNSPECIFIED EYELID: Chronic | ICD-10-CM

## 2020-06-29 DIAGNOSIS — E03.9 HYPOTHYROIDISM, UNSPECIFIED: ICD-10-CM

## 2020-06-29 DIAGNOSIS — N76.5 ULCERATION OF VAGINA: ICD-10-CM

## 2020-06-29 LAB
HCT VFR BLD CALC: 37 % — SIGNIFICANT CHANGE UP (ref 34.5–45)
HGB BLD-MCNC: 11.7 G/DL — SIGNIFICANT CHANGE UP (ref 11.5–15.5)
MCHC RBC-ENTMCNC: 29.3 PG — SIGNIFICANT CHANGE UP (ref 27–34)
MCHC RBC-ENTMCNC: 31.6 GM/DL — LOW (ref 32–36)
MCV RBC AUTO: 92.5 FL — SIGNIFICANT CHANGE UP (ref 80–100)
PLATELET # BLD AUTO: 296 K/UL — SIGNIFICANT CHANGE UP (ref 150–400)
RBC # BLD: 4 M/UL — SIGNIFICANT CHANGE UP (ref 3.8–5.2)
RBC # FLD: 13 % — SIGNIFICANT CHANGE UP (ref 10.3–14.5)
T4 FREE+ TSH PNL SERPL: 0.67 UIU/ML — SIGNIFICANT CHANGE UP (ref 0.27–4.2)
WBC # BLD: 7.5 K/UL — SIGNIFICANT CHANGE UP (ref 3.8–10.5)
WBC # FLD AUTO: 7.5 K/UL — SIGNIFICANT CHANGE UP (ref 3.8–10.5)

## 2020-06-29 PROCEDURE — G0439: CPT

## 2020-06-29 PROCEDURE — 85027 COMPLETE CBC AUTOMATED: CPT

## 2020-06-29 PROCEDURE — G0439: CPT | Mod: GE

## 2020-06-29 PROCEDURE — 84443 ASSAY THYROID STIM HORMONE: CPT

## 2020-06-29 PROCEDURE — 80053 COMPREHEN METABOLIC PANEL: CPT

## 2020-06-29 RX ORDER — FEXOFENADINE HYDROCHLORIDE 180 MG/1
180 TABLET ORAL DAILY
Qty: 90 | Refills: 1 | Status: DISCONTINUED | COMMUNITY
Start: 2019-05-08 | End: 2020-06-29

## 2020-06-29 RX ORDER — ALBUTEROL SULFATE 108 UG/1
108 (90 BASE) AEROSOL, METERED RESPIRATORY (INHALATION)
Qty: 1 | Refills: 1 | Status: DISCONTINUED | COMMUNITY
Start: 2018-12-12 | End: 2020-06-29

## 2020-06-29 RX ORDER — BENZONATATE 100 MG/1
100 CAPSULE ORAL 3 TIMES DAILY
Qty: 15 | Refills: 0 | Status: DISCONTINUED | COMMUNITY
Start: 2020-01-14 | End: 2020-06-29

## 2020-06-30 PROBLEM — R73.03 PREDIABETES: Status: ACTIVE | Noted: 2017-04-07

## 2020-06-30 PROBLEM — F32.9 DEPRESSION: Status: ACTIVE | Noted: 2020-06-29

## 2020-06-30 LAB
ALBUMIN SERPL ELPH-MCNC: 4.5 G/DL — SIGNIFICANT CHANGE UP (ref 3.3–5)
ALP SERPL-CCNC: 89 U/L — SIGNIFICANT CHANGE UP (ref 40–120)
ALT FLD-CCNC: 13 U/L — SIGNIFICANT CHANGE UP (ref 10–45)
ANION GAP SERPL CALC-SCNC: 12 MMOL/L — SIGNIFICANT CHANGE UP (ref 5–17)
AST SERPL-CCNC: 25 U/L — SIGNIFICANT CHANGE UP (ref 10–40)
BILIRUB SERPL-MCNC: 0.2 MG/DL — SIGNIFICANT CHANGE UP (ref 0.2–1.2)
BUN SERPL-MCNC: 18 MG/DL — SIGNIFICANT CHANGE UP (ref 7–23)
CALCIUM SERPL-MCNC: 9.5 MG/DL — SIGNIFICANT CHANGE UP (ref 8.4–10.5)
CHLORIDE SERPL-SCNC: 104 MMOL/L — SIGNIFICANT CHANGE UP (ref 96–108)
CO2 SERPL-SCNC: 25 MMOL/L — SIGNIFICANT CHANGE UP (ref 22–31)
CREAT SERPL-MCNC: 0.75 MG/DL — SIGNIFICANT CHANGE UP (ref 0.5–1.3)
GLUCOSE SERPL-MCNC: 91 MG/DL — SIGNIFICANT CHANGE UP (ref 70–99)
POTASSIUM SERPL-MCNC: 4.7 MMOL/L — SIGNIFICANT CHANGE UP (ref 3.5–5.3)
POTASSIUM SERPL-SCNC: 4.7 MMOL/L — SIGNIFICANT CHANGE UP (ref 3.5–5.3)
PROT SERPL-MCNC: 7.6 G/DL — SIGNIFICANT CHANGE UP (ref 6–8.3)
SODIUM SERPL-SCNC: 140 MMOL/L — SIGNIFICANT CHANGE UP (ref 135–145)

## 2020-07-17 ENCOUNTER — APPOINTMENT (OUTPATIENT)
Dept: OPHTHALMOLOGY | Facility: CLINIC | Age: 70
End: 2020-07-17
Payer: MEDICARE

## 2020-07-17 ENCOUNTER — NON-APPOINTMENT (OUTPATIENT)
Age: 70
End: 2020-07-17

## 2020-07-17 PROCEDURE — 92133 CPTRZD OPH DX IMG PST SGM ON: CPT

## 2020-07-17 PROCEDURE — 92012 INTRM OPH EXAM EST PATIENT: CPT

## 2020-07-25 NOTE — HISTORY OF PRESENT ILLNESS
[de-identified] : 71 yo F w/ pre- DM, hypothyroidism presenting for CPE\par \par Patient reports that she is in good health generally, she states that she has bilateral hand numbness, morning stiffness that lasts about 7 minutes, and cramps in her legs that have been bothering her but not affecting her functional status. She also has chronic knee pain that has been seen by Ortho in the past and is now requesting to see them again for her worsening R knee pain for possible PT vs steroid injection. Patient also requesting a renewal of Valtrex which she had prescribed last year for intermittent herpetic lesions both in the vaginal area as well as periorally, will sent rx for PRN valtrex in case patient has recurrent episode. Patient also requesting referral for mammogram which was due in April, will provide referral for mammo. For colon cancer screening, patient follows with Dr. Medina (GI) at [facility], has colonoscopy scheduled in July and requires a referral faxed over to the office which was done. Patient complaining of hemorrhoids and rectal itching, discussed using preparation H and keeping area clean to improve symptoms. \par Of note, patient reported that her mood has depressed as of lately between COVID pandemic and her concern for her sister who just underwent a lung transplant with complications. PHQ9 was 12, patient was initially reluctant to consider behavioral health services, however later agreed for an initial consultation.

## 2020-07-25 NOTE — HEALTH RISK ASSESSMENT
[Good] : ~his/her~ current health as good [Yes] : Yes [Monthly or less (1 pt)] : Monthly or less (1 point) [1 or 2 (0 pts)] : 1 or 2 (0 points) [No] : In the past 12 months have you used drugs other than those required for medical reasons? No [Never (0 pts)] : Never (0 points) [No falls in past year] : Patient reported no falls in the past year [2] : 1) Little interest or pleasure doing things for more than half of the days (2) [1] : 2) Feeling down, depressed, or hopeless for several days (1) [Alone] : lives alone [Employed] : employed [Feels Safe at Home] : Feels safe at home [Fully functional (bathing, dressing, toileting, transferring, walking, feeding)] : Fully functional (bathing, dressing, toileting, transferring, walking, feeding) [Fully functional (using the telephone, shopping, preparing meals, housekeeping, doing laundry, using] : Fully functional and needs no help or supervision to perform IADLs (using the telephone, shopping, preparing meals, housekeeping, doing laundry, using transportation, managing medications and managing finances) [Reports changes in vision] : Reports changes in vision [Poor] : ~his/her~ mood as  poor [] : No [FreeTextEntry1] : depression [de-identified] : Part-time works, used to walk a lot but now knee hurts  [Audit-CScore] : 1 [Sexually Active] : not sexually active [de-identified] : 2 meals a day, vegetables, chicken (no red meat, and allergy to fish) [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

## 2020-07-25 NOTE — PHYSICAL EXAM
[Normal] : affect was normal and insight and judgment were intact [de-identified] : Thyroid enlarged, tender to palpation [FreeTextEntry1] : External rectal exam done per pt request, no hemorrhoids observed, however, some dry, irritated patches in the zenon-rectal area were noted

## 2020-07-25 NOTE — ASSESSMENT
[FreeTextEntry1] : #Healthcare Maintenance\par -Mammogram referral sent\par -Referral for colonoscopy faxed to patient's GI's office (Dr. Medina at [facility])\par -f/u routine bloodwork\par \par RTC in 6 months-1 year unless patient has an acute need. \par \par d/w Dr. Schwartz\par \par Ju Barber, PGY-2\par Internal Medicine\par

## 2020-08-30 ENCOUNTER — FORM ENCOUNTER (OUTPATIENT)
Age: 70
End: 2020-08-30

## 2020-08-31 ENCOUNTER — APPOINTMENT (OUTPATIENT)
Dept: RHEUMATOLOGY | Facility: CLINIC | Age: 70
End: 2020-08-31
Payer: MEDICARE

## 2020-08-31 VITALS
SYSTOLIC BLOOD PRESSURE: 150 MMHG | TEMPERATURE: 97.6 F | HEIGHT: 62 IN | WEIGHT: 132 LBS | DIASTOLIC BLOOD PRESSURE: 79 MMHG | HEART RATE: 71 BPM | OXYGEN SATURATION: 97 % | BODY MASS INDEX: 24.29 KG/M2

## 2020-08-31 DIAGNOSIS — M25.531 PAIN IN RIGHT WRIST: ICD-10-CM

## 2020-08-31 DIAGNOSIS — R76.8 OTHER SPECIFIED ABNORMAL IMMUNOLOGICAL FINDINGS IN SERUM: ICD-10-CM

## 2020-08-31 DIAGNOSIS — Z87.39 PERSONAL HISTORY OF OTHER DISEASES OF THE MUSCULOSKELETAL SYSTEM AND CONNECTIVE TISSUE: ICD-10-CM

## 2020-08-31 PROCEDURE — 73130 X-RAY EXAM OF HAND: CPT | Mod: RT

## 2020-08-31 PROCEDURE — 99214 OFFICE O/P EST MOD 30 MIN: CPT

## 2020-08-31 PROCEDURE — 73030 X-RAY EXAM OF SHOULDER: CPT | Mod: RT

## 2020-08-31 PROCEDURE — 73110 X-RAY EXAM OF WRIST: CPT | Mod: RT

## 2020-09-04 PROBLEM — M25.531 RIGHT WRIST PAIN: Status: ACTIVE | Noted: 2020-09-04

## 2020-09-04 PROBLEM — Z87.39 HISTORY OF JOINT PAIN: Status: RESOLVED | Noted: 2019-07-22 | Resolved: 2020-09-04

## 2020-09-04 PROBLEM — R76.8 POSITIVE DOUBLE STRANDED DNA ANTIBODY TEST: Status: ACTIVE | Noted: 2019-09-23

## 2020-09-04 LAB
ALBUMIN SERPL ELPH-MCNC: 4.4 G/DL
ALP BLD-CCNC: 80 U/L
ALT SERPL-CCNC: 12 U/L
ANION GAP SERPL CALC-SCNC: 11 MMOL/L
APPEARANCE: CLEAR
AST SERPL-CCNC: 25 U/L
BACTERIA: NEGATIVE
BASOPHILS # BLD AUTO: 0.05 K/UL
BASOPHILS NFR BLD AUTO: 0.6 %
BILIRUB SERPL-MCNC: 0.2 MG/DL
BILIRUBIN URINE: NEGATIVE
BLOOD URINE: NEGATIVE
BUN SERPL-MCNC: 13 MG/DL
C3 SERPL-MCNC: 109 MG/DL
C4 SERPL-MCNC: 26 MG/DL
CALCIUM SERPL-MCNC: 9.3 MG/DL
CHLORIDE SERPL-SCNC: 101 MMOL/L
CO2 SERPL-SCNC: 26 MMOL/L
COLOR: NORMAL
CREAT SERPL-MCNC: 0.73 MG/DL
CREAT SPEC-SCNC: 22 MG/DL
CREAT/PROT UR: NORMAL RATIO
DSDNA AB SER-ACNC: 16 IU/ML
EOSINOPHIL # BLD AUTO: 0.31 K/UL
EOSINOPHIL NFR BLD AUTO: 3.6 %
GLUCOSE QUALITATIVE U: NEGATIVE
GLUCOSE SERPL-MCNC: 88 MG/DL
HCT VFR BLD CALC: 37.7 %
HGB BLD-MCNC: 12 G/DL
HYALINE CASTS: 1 /LPF
IMM GRANULOCYTES NFR BLD AUTO: 0.2 %
KETONES URINE: NEGATIVE
LEUKOCYTE ESTERASE URINE: ABNORMAL
LYMPHOCYTES # BLD AUTO: 2.4 K/UL
LYMPHOCYTES NFR BLD AUTO: 27.8 %
MAN DIFF?: NORMAL
MCHC RBC-ENTMCNC: 29.8 PG
MCHC RBC-ENTMCNC: 31.8 GM/DL
MCV RBC AUTO: 93.5 FL
MICROSCOPIC-UA: NORMAL
MONOCYTES # BLD AUTO: 0.79 K/UL
MONOCYTES NFR BLD AUTO: 9.2 %
NEUTROPHILS # BLD AUTO: 5.05 K/UL
NEUTROPHILS NFR BLD AUTO: 58.6 %
NITRITE URINE: NEGATIVE
PH URINE: 7
PLATELET # BLD AUTO: 282 K/UL
POTASSIUM SERPL-SCNC: 5 MMOL/L
PROT SERPL-MCNC: 7.3 G/DL
PROT UR-MCNC: <4 MG/DL
PROTEIN URINE: NEGATIVE
RBC # BLD: 4.03 M/UL
RBC # FLD: 13.3 %
RED BLOOD CELLS URINE: 5 /HPF
SODIUM SERPL-SCNC: 139 MMOL/L
SPECIFIC GRAVITY URINE: 1.01
SQUAMOUS EPITHELIAL CELLS: 2 /HPF
UROBILINOGEN URINE: NORMAL
WBC # FLD AUTO: 8.62 K/UL
WHITE BLOOD CELLS URINE: 3 /HPF

## 2020-09-25 ENCOUNTER — RESULT REVIEW (OUTPATIENT)
Age: 70
End: 2020-09-25

## 2020-09-25 ENCOUNTER — APPOINTMENT (OUTPATIENT)
Dept: MAMMOGRAPHY | Facility: IMAGING CENTER | Age: 70
End: 2020-09-25
Payer: MEDICARE

## 2020-09-25 ENCOUNTER — APPOINTMENT (OUTPATIENT)
Dept: ULTRASOUND IMAGING | Facility: IMAGING CENTER | Age: 70
End: 2020-09-25
Payer: MEDICARE

## 2020-09-25 ENCOUNTER — OUTPATIENT (OUTPATIENT)
Dept: OUTPATIENT SERVICES | Facility: HOSPITAL | Age: 70
LOS: 1 days | End: 2020-09-25
Payer: MEDICARE

## 2020-09-25 DIAGNOSIS — H02.209 UNSPECIFIED LAGOPHTHALMOS UNSPECIFIED EYE, UNSPECIFIED EYELID: Chronic | ICD-10-CM

## 2020-09-25 DIAGNOSIS — Z00.00 ENCOUNTER FOR GENERAL ADULT MEDICAL EXAMINATION WITHOUT ABNORMAL FINDINGS: ICD-10-CM

## 2020-09-25 DIAGNOSIS — H26.9 UNSPECIFIED CATARACT: Chronic | ICD-10-CM

## 2020-09-25 DIAGNOSIS — E03.9 HYPOTHYROIDISM, UNSPECIFIED: ICD-10-CM

## 2020-09-25 PROCEDURE — 77067 SCR MAMMO BI INCL CAD: CPT | Mod: 26

## 2020-09-25 PROCEDURE — 77063 BREAST TOMOSYNTHESIS BI: CPT | Mod: 26

## 2020-09-25 PROCEDURE — 76536 US EXAM OF HEAD AND NECK: CPT | Mod: 26

## 2020-09-25 PROCEDURE — 76536 US EXAM OF HEAD AND NECK: CPT

## 2020-09-25 PROCEDURE — 77067 SCR MAMMO BI INCL CAD: CPT

## 2020-09-25 PROCEDURE — 77063 BREAST TOMOSYNTHESIS BI: CPT

## 2020-12-11 ENCOUNTER — RX RENEWAL (OUTPATIENT)
Age: 70
End: 2020-12-11

## 2020-12-23 PROBLEM — Z87.09 HISTORY OF SORE THROAT: Status: RESOLVED | Noted: 2020-01-14 | Resolved: 2020-12-23

## 2020-12-30 ENCOUNTER — TRANSCRIPTION ENCOUNTER (OUTPATIENT)
Age: 70
End: 2020-12-30

## 2021-01-11 ENCOUNTER — APPOINTMENT (OUTPATIENT)
Dept: OPHTHALMOLOGY | Facility: CLINIC | Age: 71
End: 2021-01-11
Payer: MEDICARE

## 2021-01-11 ENCOUNTER — NON-APPOINTMENT (OUTPATIENT)
Age: 71
End: 2021-01-11

## 2021-01-11 PROCEDURE — 99072 ADDL SUPL MATRL&STAF TM PHE: CPT

## 2021-01-11 PROCEDURE — 92014 COMPRE OPH EXAM EST PT 1/>: CPT

## 2021-01-11 PROCEDURE — 92133 CPTRZD OPH DX IMG PST SGM ON: CPT

## 2021-01-11 PROCEDURE — 92083 EXTENDED VISUAL FIELD XM: CPT

## 2021-02-22 ENCOUNTER — APPOINTMENT (OUTPATIENT)
Dept: OPHTHALMOLOGY | Facility: CLINIC | Age: 71
End: 2021-02-22
Payer: MEDICARE

## 2021-02-22 ENCOUNTER — NON-APPOINTMENT (OUTPATIENT)
Age: 71
End: 2021-02-22

## 2021-02-22 PROCEDURE — 76514 ECHO EXAM OF EYE THICKNESS: CPT

## 2021-02-22 PROCEDURE — 92083 EXTENDED VISUAL FIELD XM: CPT

## 2021-02-22 PROCEDURE — 68761 CLOSE TEAR DUCT OPENING: CPT | Mod: E4,E2

## 2021-02-22 PROCEDURE — 99072 ADDL SUPL MATRL&STAF TM PHE: CPT

## 2021-02-22 PROCEDURE — 92012 INTRM OPH EXAM EST PATIENT: CPT | Mod: 25

## 2021-04-05 ENCOUNTER — APPOINTMENT (OUTPATIENT)
Dept: GASTROENTEROLOGY | Facility: CLINIC | Age: 71
End: 2021-04-05
Payer: MEDICARE

## 2021-04-05 VITALS
HEIGHT: 62 IN | HEART RATE: 77 BPM | SYSTOLIC BLOOD PRESSURE: 160 MMHG | DIASTOLIC BLOOD PRESSURE: 81 MMHG | BODY MASS INDEX: 23.92 KG/M2 | WEIGHT: 130 LBS | TEMPERATURE: 97.6 F

## 2021-04-05 DIAGNOSIS — K57.30 DIVERTICULOSIS OF LARGE INTESTINE W/OUT PERFORATION OR ABSCESS W/OUT BLEEDING: ICD-10-CM

## 2021-04-05 DIAGNOSIS — K80.20 CALCULUS OF GALLBLADDER W/OUT CHOLECYSTITIS W/OUT OBSTRUCTION: ICD-10-CM

## 2021-04-05 PROCEDURE — 99204 OFFICE O/P NEW MOD 45 MIN: CPT

## 2021-04-05 PROCEDURE — 99072 ADDL SUPL MATRL&STAF TM PHE: CPT

## 2021-04-05 PROCEDURE — 82272 OCCULT BLD FECES 1-3 TESTS: CPT

## 2021-04-05 RX ORDER — TIMOLOL MALEATE 2.5 MG/ML
0.25 SOLUTION/ DROPS OPHTHALMIC
Qty: 10 | Refills: 0 | Status: ACTIVE | COMMUNITY
Start: 2020-12-14

## 2021-04-05 NOTE — HISTORY OF PRESENT ILLNESS
[Heartburn] : stable heartburn [Nausea] : denies nausea [Vomiting] : denies vomiting [Diarrhea] : denies diarrhea [Constipation] : stable constipation [Yellow Skin Or Eyes (Jaundice)] : denies jaundice [Abdominal Pain] : stable abdominal pain [Abdominal Swelling] : denies abdominal swelling [Rectal Pain] : denies rectal pain [_________] : Performed [unfilled] [de-identified] : Marissa presents to the office today for evaluation of abdominal pain and constipation.\par \par The patient reports that she has had chronic constipation for the past 27-30 years (since she immigrated to the [place]).  She currently uses Dulcolax, Prune-Lax, or Soft stools every 2-3 days but can go 3-4 days between BMs.  She does not feel a sensation to have a BM and sits on the toilet for a long time straining before passing stool.  In addition to constipation, she has upper abdominal discomfort, lower abdominal cramping, and bloating.  Currently, her appetite is very poor but her weight has been stable.  She denies any rectal bleeding.\par \par From [id number], the patient was seen by Dr. Bin Witt for constipation before a repair of a uterine prolapse and rectocele.  Anorectal manometry and anal spincter EMG were normal and a MR defecography showed a large cystocele and a partial tear of the levator ani muscle.  He felt her constipation was functional in etiology.  She underwent a colonoscopy in 2018 with Dr. Kamara with removal of 2 polyps.  She also saw Dr. Medina ([facility]) in September 2020 and underwent an EGD (gastritis) and colonoscopy (tubular adenoma, diverticulosis).\par  [de-identified] : gallstones [de-identified] : gastritis [de-identified] : tubular adenoma, diverticulosis

## 2021-04-05 NOTE — REVIEW OF SYSTEMS
[Chills] : chills [Eyes Itch] : itching of the eyes [Negative] : Heme/Lymph [As Noted in HPI] : as noted in HPI

## 2021-04-05 NOTE — PHYSICAL EXAM
[General Appearance - Alert] : alert [General Appearance - In No Acute Distress] : in no acute distress [Sclera] : the sclera and conjunctiva were normal [Extraocular Movements] : extraocular movements were intact [Outer Ear] : the ears and nose were normal in appearance [Hearing Threshold Finger Rub Not [place]] : hearing was normal [Neck Appearance] : the appearance of the neck was normal [Neck Cervical Mass (___cm)] : no neck mass was observed [] : no respiratory distress [Auscultation Breath Sounds / Voice Sounds] : lungs were clear to auscultation bilaterally [Heart Rate And Rhythm] : heart rate was normal and rhythm regular [Heart Sounds] : normal S1 and S2 [Edema] : there was no peripheral edema [Abdomen Soft] : soft [Abdomen Tenderness] : non-tender [Normal Sphincter Tone] : normal sphincter tone [No Rectal Mass] : no rectal mass [Cervical Lymph Nodes Enlarged Anterior Bilaterally] : anterior cervical [Supraclavicular Lymph Nodes Enlarged Bilaterally] : supraclavicular [No CVA Tenderness] : no ~M costovertebral angle tenderness [Abnormal Walk] : normal gait [Musculoskeletal - Swelling] : no joint swelling seen [Skin Color & Pigmentation] : normal skin color and pigmentation [Skin Turgor] : normal skin turgor [No Focal Deficits] : no focal deficits [Oriented To Time, Place, And Person] : oriented to person, place, and time [Impaired Insight] : insight and judgment were intact [External Hemorrhoid] : no external hemorrhoids [Occult Blood Positive] : stool was negative for occult blood [FreeTextEntry1] : soft brown stool

## 2021-04-05 NOTE — ASSESSMENT
[FreeTextEntry1] : 1.  Chronic constipation, likely functional in etiology.  Colonoscopy in September 2020 with tubular adenoma, diverticulosis.  Prior anorectal manometry, anal sphincter EMG with Dr. Bin Witt unremarkable.  MR Defecography (2014) with cystocele and partial tear of levator ani muscle.\par 2.  Epigastric pain.  EGD in September 2020 with gastritis.  Rule out biliary colic.\par 3.  Asthma.\par 4.  Chronic urticaria.\par 5.  HTN.\par 6.  Hypothyroidism.\par 7.  Osteopenia.\par 8.  Status post JACK-BSO.\par \par Recs:\par - Labs and prior records reviewed.\par - Per prior records, patient had response using Colace.  Patient was advised to use Colace 2-3 tabs daily and a stimulant laxative PRN with goal to have 3 BMs a week.\par - Famotidine at bedtime.\par - Repeat colonoscopy in 2025.\par - Check abdominal US.

## 2021-04-09 NOTE — ED ADULT NURSE NOTE - BREATHING, MLM
----- Message from JONO Demarco sent at 4/8/2021  7:12 PM CDT -----  Please notify the patient of culture results, common bacteria found on the skin. He can complete the oral antibiotics prescribed at his visit. Follow-up as planned.   Spontaneous, unlabored and symmetrical

## 2021-04-14 ENCOUNTER — APPOINTMENT (OUTPATIENT)
Dept: INTERNAL MEDICINE | Facility: CLINIC | Age: 71
End: 2021-04-14
Payer: MEDICARE

## 2021-04-14 ENCOUNTER — OUTPATIENT (OUTPATIENT)
Dept: OUTPATIENT SERVICES | Facility: HOSPITAL | Age: 71
LOS: 1 days | End: 2021-04-14
Payer: MEDICARE

## 2021-04-14 DIAGNOSIS — H26.9 UNSPECIFIED CATARACT: Chronic | ICD-10-CM

## 2021-04-14 DIAGNOSIS — R52 PAIN, UNSPECIFIED: ICD-10-CM

## 2021-04-14 DIAGNOSIS — H02.209 UNSPECIFIED LAGOPHTHALMOS UNSPECIFIED EYE, UNSPECIFIED EYELID: Chronic | ICD-10-CM

## 2021-04-14 DIAGNOSIS — E03.9 HYPOTHYROIDISM, UNSPECIFIED: ICD-10-CM

## 2021-04-14 PROCEDURE — 99213 OFFICE O/P EST LOW 20 MIN: CPT | Mod: GE,95

## 2021-04-14 PROCEDURE — G0463: CPT

## 2021-04-14 NOTE — REVIEW OF SYSTEMS
[Chills] : chills [Fatigue] : fatigue [Constipation] : constipation [Itching] : Itching [Depression] : depression [Negative] : Heme/Lymph [Fever] : no fever [Hot Flashes] : no hot flashes [Night Sweats] : no night sweats [Recent Change In Weight] : ~T no recent weight change [Abdominal Pain] : no abdominal pain [Nausea] : no nausea [Diarrhea] : diarrhea [Vomiting] : no vomiting [Heartburn] : no heartburn [Melena] : no melena [Joint Pain] : no joint pain [Muscle Pain] : no muscle pain [Skin Rash] : no skin rash [Suicidal] : not suicidal [Insomnia] : no insomnia [Anxiety] : no anxiety [de-identified] : Dry skin

## 2021-04-14 NOTE — ASSESSMENT
[FreeTextEntry1] : Ms Crouch is a 70 yo F w/ hx pre- DM, hypothyroidism presenting via telephonic visit for chronic body aches and chills. Symptoms most likely 2/2 hypothyroidism vs old age vs depression vs other rheumatologic syndrome. Symptoms are very chronic and non-progressive, and no alarm features.

## 2021-04-14 NOTE — PLAN
[FreeTextEntry1] : -Motrin PRN for body aches\par -Will task team to schedule apt with pt for CPE in next few weeks (last apt was June 2020). At that visit pt should obtain labs for TSH, ESR/CRP,  EZRA, DSDNA, CBC, CMP. \par -Pending results of these labs, consider increasing levothyroxine and/or rheum referral. Last seen by Rheumatology in August 2020. \par -F/U sooner for acute visit if symptoms worsen.

## 2021-04-14 NOTE — HISTORY OF PRESENT ILLNESS
[Home] : at home, [unfilled] , at the time of the visit. [Other Location: e.g. Home (Enter Location, City,State)___] : at [unfilled] [Verbal consent obtained from patient] : the patient, [unfilled] [FreeTextEntry8] : Ms Crouch is a 70 yo F w/ hx pre- DM, hypothyroidism presenting via telephonic visit for body aches and chills. She has been feeling this for a while now, 3-4 years. Always brings this up when she has an appointment, but has never been resolved. Has no fever. No recent illnesses or sick contacts. Lives with nephew. Has been in house. Had COVID last August. Had vaccine (Pfizer) March 3rd/22nd. Chills/body aches come and go. Come one week, goes away, comes back, goes away for a few months ect. Calling now because she has had it for 3 weeks and it hasn't stopped. Saw Rheumatologist and was told she has "cells for lupus." In review of chart, had positive EZRA 1:640 in 2019, and positive DSDNA at that time, which was negative recently. Notes dry, itchy skin, and hair loss. No major changes in weight. Feels tired, low energy, almost like depression. Has not taken anything to help with symptoms. Notes extremities are cold like ice. No headaches or joint pains. Was recently seen by GI for constipation and given colace.

## 2021-04-14 NOTE — PHYSICAL EXAM
[No Acute Distress] : no acute distress [Speech Grossly Normal] : speech grossly normal [Memory Grossly Normal] : memory grossly normal [Normal Affect] : the affect was normal [Alert and Oriented x3] : oriented to person, place, and time [Normal Mood] : the mood was normal [Normal Insight/Judgement] : insight and judgment were intact

## 2021-04-16 ENCOUNTER — APPOINTMENT (OUTPATIENT)
Dept: ULTRASOUND IMAGING | Facility: IMAGING CENTER | Age: 71
End: 2021-04-16
Payer: MEDICARE

## 2021-04-16 ENCOUNTER — OUTPATIENT (OUTPATIENT)
Dept: OUTPATIENT SERVICES | Facility: HOSPITAL | Age: 71
LOS: 1 days | End: 2021-04-16
Payer: MEDICARE

## 2021-04-16 DIAGNOSIS — H02.209 UNSPECIFIED LAGOPHTHALMOS UNSPECIFIED EYE, UNSPECIFIED EYELID: Chronic | ICD-10-CM

## 2021-04-16 DIAGNOSIS — H26.9 UNSPECIFIED CATARACT: Chronic | ICD-10-CM

## 2021-04-16 DIAGNOSIS — R92.2 INCONCLUSIVE MAMMOGRAM: ICD-10-CM

## 2021-04-16 DIAGNOSIS — H26.493 OTHER SECONDARY CATARACT, BILATERAL: ICD-10-CM

## 2021-04-16 DIAGNOSIS — R68.83 CHILLS (WITHOUT FEVER): ICD-10-CM

## 2021-04-16 DIAGNOSIS — K80.20 CALCULUS OF GALLBLADDER WITHOUT CHOLECYSTITIS WITHOUT OBSTRUCTION: ICD-10-CM

## 2021-04-16 DIAGNOSIS — H26.9 UNSPECIFIED CATARACT: ICD-10-CM

## 2021-04-16 PROCEDURE — 76700 US EXAM ABDOM COMPLETE: CPT

## 2021-04-16 PROCEDURE — 76700 US EXAM ABDOM COMPLETE: CPT | Mod: 26

## 2021-05-28 ENCOUNTER — RX RENEWAL (OUTPATIENT)
Age: 71
End: 2021-05-28

## 2021-06-16 ENCOUNTER — LABORATORY RESULT (OUTPATIENT)
Age: 71
End: 2021-06-16

## 2021-06-16 ENCOUNTER — RX RENEWAL (OUTPATIENT)
Age: 71
End: 2021-06-16

## 2021-06-16 ENCOUNTER — OUTPATIENT (OUTPATIENT)
Dept: OUTPATIENT SERVICES | Facility: HOSPITAL | Age: 71
LOS: 1 days | End: 2021-06-16
Payer: MEDICARE

## 2021-06-16 ENCOUNTER — NON-APPOINTMENT (OUTPATIENT)
Age: 71
End: 2021-06-16

## 2021-06-16 ENCOUNTER — APPOINTMENT (OUTPATIENT)
Dept: INTERNAL MEDICINE | Facility: CLINIC | Age: 71
End: 2021-06-16
Payer: MEDICARE

## 2021-06-16 VITALS
HEIGHT: 62 IN | OXYGEN SATURATION: 98 % | BODY MASS INDEX: 24.84 KG/M2 | SYSTOLIC BLOOD PRESSURE: 162 MMHG | DIASTOLIC BLOOD PRESSURE: 90 MMHG | HEART RATE: 72 BPM | WEIGHT: 135 LBS

## 2021-06-16 DIAGNOSIS — I10 ESSENTIAL (PRIMARY) HYPERTENSION: ICD-10-CM

## 2021-06-16 DIAGNOSIS — H26.9 UNSPECIFIED CATARACT: Chronic | ICD-10-CM

## 2021-06-16 DIAGNOSIS — E78.5 HYPERLIPIDEMIA, UNSPECIFIED: ICD-10-CM

## 2021-06-16 DIAGNOSIS — H02.209 UNSPECIFIED LAGOPHTHALMOS UNSPECIFIED EYE, UNSPECIFIED EYELID: Chronic | ICD-10-CM

## 2021-06-16 LAB
A1C WITH ESTIMATED AVERAGE GLUCOSE RESULT: 5.7 % — HIGH (ref 4–5.6)
ALBUMIN SERPL ELPH-MCNC: 4.6 G/DL — SIGNIFICANT CHANGE UP (ref 3.3–5)
ALP SERPL-CCNC: 99 U/L — SIGNIFICANT CHANGE UP (ref 40–120)
ALT FLD-CCNC: 14 U/L — SIGNIFICANT CHANGE UP (ref 10–45)
ANION GAP SERPL CALC-SCNC: 14 MMOL/L — SIGNIFICANT CHANGE UP (ref 5–17)
APPEARANCE UR: CLEAR — SIGNIFICANT CHANGE UP
AST SERPL-CCNC: 23 U/L — SIGNIFICANT CHANGE UP (ref 10–40)
BACTERIA # UR AUTO: NEGATIVE — SIGNIFICANT CHANGE UP
BILIRUB SERPL-MCNC: 0.4 MG/DL — SIGNIFICANT CHANGE UP (ref 0.2–1.2)
BILIRUB UR-MCNC: NEGATIVE — SIGNIFICANT CHANGE UP
BUN SERPL-MCNC: 12 MG/DL — SIGNIFICANT CHANGE UP (ref 7–23)
CALCIUM SERPL-MCNC: 9.5 MG/DL — SIGNIFICANT CHANGE UP (ref 8.4–10.5)
CHLORIDE SERPL-SCNC: 99 MMOL/L — SIGNIFICANT CHANGE UP (ref 96–108)
CHOLEST SERPL-MCNC: 197 MG/DL — SIGNIFICANT CHANGE UP
CO2 SERPL-SCNC: 25 MMOL/L — SIGNIFICANT CHANGE UP (ref 22–31)
COLOR SPEC: SIGNIFICANT CHANGE UP
CREAT SERPL-MCNC: 0.7 MG/DL — SIGNIFICANT CHANGE UP (ref 0.5–1.3)
CRP SERPL-MCNC: <3 MG/L — SIGNIFICANT CHANGE UP
DIFF PNL FLD: SIGNIFICANT CHANGE UP
EPI CELLS # UR: 4 /HPF — SIGNIFICANT CHANGE UP (ref 0–5)
ERYTHROCYTE [SEDIMENTATION RATE] IN BLOOD: 17 MM/HR — SIGNIFICANT CHANGE UP (ref 0–20)
ESTIMATED AVERAGE GLUCOSE: 117 MG/DL — HIGH (ref 68–114)
GLUCOSE SERPL-MCNC: 83 MG/DL — SIGNIFICANT CHANGE UP (ref 70–99)
GLUCOSE UR QL: NEGATIVE — SIGNIFICANT CHANGE UP
HCT VFR BLD CALC: 39.1 % — SIGNIFICANT CHANGE UP (ref 34.5–45)
HDLC SERPL-MCNC: 58 MG/DL — SIGNIFICANT CHANGE UP
HGB BLD-MCNC: 12.4 G/DL — SIGNIFICANT CHANGE UP (ref 11.5–15.5)
HYALINE CASTS # UR AUTO: 0 /LPF — SIGNIFICANT CHANGE UP (ref 0–7)
KETONES UR-MCNC: NEGATIVE — SIGNIFICANT CHANGE UP
LEUKOCYTE ESTERASE UR-ACNC: ABNORMAL
LIPID PNL WITH DIRECT LDL SERPL: 121 MG/DL — HIGH
MCHC RBC-ENTMCNC: 29.9 PG — SIGNIFICANT CHANGE UP (ref 27–34)
MCHC RBC-ENTMCNC: 31.7 GM/DL — LOW (ref 32–36)
MCV RBC AUTO: 94.2 FL — SIGNIFICANT CHANGE UP (ref 80–100)
NITRITE UR-MCNC: NEGATIVE — SIGNIFICANT CHANGE UP
NON HDL CHOLESTEROL: 138 MG/DL — HIGH
PH UR: 6.5 — SIGNIFICANT CHANGE UP (ref 5–8)
PLATELET # BLD AUTO: 263 K/UL — SIGNIFICANT CHANGE UP (ref 150–400)
POTASSIUM SERPL-MCNC: 4.8 MMOL/L — SIGNIFICANT CHANGE UP (ref 3.5–5.3)
POTASSIUM SERPL-SCNC: 4.8 MMOL/L — SIGNIFICANT CHANGE UP (ref 3.5–5.3)
PROT SERPL-MCNC: 8.2 G/DL — SIGNIFICANT CHANGE UP (ref 6–8.3)
PROT UR-MCNC: NEGATIVE — SIGNIFICANT CHANGE UP
RBC # BLD: 4.15 M/UL — SIGNIFICANT CHANGE UP (ref 3.8–5.2)
RBC # FLD: 13 % — SIGNIFICANT CHANGE UP (ref 10.3–14.5)
RBC CASTS # UR COMP ASSIST: 2 /HPF — SIGNIFICANT CHANGE UP (ref 0–4)
SODIUM SERPL-SCNC: 138 MMOL/L — SIGNIFICANT CHANGE UP (ref 135–145)
SP GR SPEC: 1.01 — LOW (ref 1.01–1.02)
T4 FREE+ TSH PNL SERPL: 1.04 UIU/ML — SIGNIFICANT CHANGE UP (ref 0.27–4.2)
TRIGL SERPL-MCNC: 89 MG/DL — SIGNIFICANT CHANGE UP
UROBILINOGEN FLD QL: SIGNIFICANT CHANGE UP
WBC # BLD: 9.06 K/UL — SIGNIFICANT CHANGE UP (ref 3.8–10.5)
WBC # FLD AUTO: 9.06 K/UL — SIGNIFICANT CHANGE UP (ref 3.8–10.5)
WBC UR QL: 4 /HPF — SIGNIFICANT CHANGE UP (ref 0–5)

## 2021-06-16 PROCEDURE — 83036 HEMOGLOBIN GLYCOSYLATED A1C: CPT

## 2021-06-16 PROCEDURE — 80053 COMPREHEN METABOLIC PANEL: CPT

## 2021-06-16 PROCEDURE — 86038 ANTINUCLEAR ANTIBODIES: CPT

## 2021-06-16 PROCEDURE — 80061 LIPID PANEL: CPT

## 2021-06-16 PROCEDURE — G0463: CPT

## 2021-06-16 PROCEDURE — 85027 COMPLETE CBC AUTOMATED: CPT

## 2021-06-16 PROCEDURE — 85652 RBC SED RATE AUTOMATED: CPT

## 2021-06-16 PROCEDURE — 99212 OFFICE O/P EST SF 10 MIN: CPT | Mod: GE

## 2021-06-16 PROCEDURE — 87086 URINE CULTURE/COLONY COUNT: CPT

## 2021-06-16 PROCEDURE — 86225 DNA ANTIBODY NATIVE: CPT

## 2021-06-16 PROCEDURE — 86140 C-REACTIVE PROTEIN: CPT

## 2021-06-16 PROCEDURE — 81001 URINALYSIS AUTO W/SCOPE: CPT

## 2021-06-16 PROCEDURE — 84443 ASSAY THYROID STIM HORMONE: CPT

## 2021-06-17 ENCOUNTER — RX RENEWAL (OUTPATIENT)
Age: 71
End: 2021-06-17

## 2021-06-17 PROBLEM — E78.5 HYPERLIPIDEMIA: Status: ACTIVE | Noted: 2021-06-17

## 2021-06-17 LAB
ANA PAT FLD IF-IMP: ABNORMAL
ANA TITR SER: ABNORMAL

## 2021-06-18 LAB
CULTURE RESULTS: SIGNIFICANT CHANGE UP
DSDNA AB SER-ACNC: 21 IU/ML — SIGNIFICANT CHANGE UP
SPECIMEN SOURCE: SIGNIFICANT CHANGE UP

## 2021-06-21 NOTE — REVIEW OF SYSTEMS
[Fatigue] : fatigue [Hot Flashes] : hot flashes [Joint Pain] : joint pain [Joint Stiffness] : joint stiffness [Joint Swelling] : joint swelling [Back Pain] : back pain [Negative] : Neurological [Fever] : no fever [Chills] : no chills [Night Sweats] : no night sweats [Recent Change In Weight] : ~T no recent weight change [Muscle Weakness] : no muscle weakness [Muscle Pain] : no muscle pain

## 2021-06-21 NOTE — HEALTH RISK ASSESSMENT
[Fair] :  ~his/her~ mood as fair [Yes] : Yes [Monthly or less (1 pt)] : Monthly or less (1 point) [1 or 2 (0 pts)] : 1 or 2 (0 points) [Never (0 pts)] : Never (0 points) [No] : In the past 12 months have you used drugs other than those required for medical reasons? No [No falls in past year] : Patient reported no falls in the past year [2] : 2) Feeling down, depressed, or hopeless for more than half of the days (2) [With Family] : lives with family [Employed] : employed [Single] : single [Feels Safe at Home] : Feels safe at home [Fully functional (bathing, dressing, toileting, transferring, walking, feeding)] : Fully functional (bathing, dressing, toileting, transferring, walking, feeding) [Fully functional (using the telephone, shopping, preparing meals, housekeeping, doing laundry, using] : Fully functional and needs no help or supervision to perform IADLs (using the telephone, shopping, preparing meals, housekeeping, doing laundry, using transportation, managing medications and managing finances) [] : No [Audit-CScore] : 1 [de-identified] : Sedentary [de-identified] : plantains, vegetables,  [SOL3Pqlod] : 4 [Change in mental status noted] : No change in mental status noted [Language] : denies difficulty with language [Behavior] : denies difficulty with behavior [Learning/Retaining New Information] : denies difficulty learning/retaining new information [Handling Complex Tasks] : denies difficulty handling complex tasks [Reasoning] : denies difficulty with reasoning [Spatial Ability and Orientation] : denies difficulty with spatial ability and orientation [Sexually Active] : not sexually active [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

## 2021-06-21 NOTE — HISTORY OF PRESENT ILLNESS
[FreeTextEntry1] : CPE [de-identified] : 70 yo F w/ hx pre- DM, hypothyroidism presenting for CPE.\par \par #Body Aches\par -Persistent chills/body aches come and go for a few years. Denies fever, weight loss, sick contacts.\par - Has fatigue, decreased appetite, dry skin, constipation, poor concentration/depressed mood, hair loss, joint pain\par -Skin lesions on extremities that look like bug bites. No one else in home has these bites. Itchy, no bleeding or discharge, erythematous.\par - Per chart review, had positive EZRA 1:640 in 2019, and positive DSDNA at that time. Repeat labs were negative\par - Xrays negative for OA or other evidence of inflammatory arthritis

## 2021-06-21 NOTE — PHYSICAL EXAM
[No Acute Distress] : no acute distress [Well Nourished] : well nourished [Well Developed] : well developed [Well-Appearing] : well-appearing [Normal Sclera/Conjunctiva] : normal sclera/conjunctiva [PERRL] : pupils equal round and reactive to light [EOMI] : extraocular movements intact [Normal Outer Ear/Nose] : the outer ears and nose were normal in appearance [Normal Oropharynx] : the oropharynx was normal [No Lymphadenopathy] : no lymphadenopathy [No JVD] : no jugular venous distention [Thyroid Normal, No Nodules] : the thyroid was normal and there were no nodules present [Supple] : supple [No Respiratory Distress] : no respiratory distress  [No Accessory Muscle Use] : no accessory muscle use [Clear to Auscultation] : lungs were clear to auscultation bilaterally [Normal Rate] : normal rate  [Regular Rhythm] : with a regular rhythm [Normal S1, S2] : normal S1 and S2 [No Murmur] : no murmur heard [No Carotid Bruits] : no carotid bruits [No Abdominal Bruit] : a ~M bruit was not heard ~T in the abdomen [No Varicosities] : no varicosities [Pedal Pulses Present] : the pedal pulses are present [No Edema] : there was no peripheral edema [No Palpable Aorta] : no palpable aorta [No Extremity Clubbing/Cyanosis] : no extremity clubbing/cyanosis [Soft] : abdomen soft [Non Tender] : non-tender [No Masses] : no abdominal mass palpated [Non-distended] : non-distended [No HSM] : no HSM [Normal Bowel Sounds] : normal bowel sounds [Normal Posterior Cervical Nodes] : no posterior cervical lymphadenopathy [Normal Anterior Cervical Nodes] : no anterior cervical lymphadenopathy [No CVA Tenderness] : no CVA  tenderness [No Spinal Tenderness] : no spinal tenderness [No Joint Swelling] : no joint swelling [Grossly Normal Strength/Tone] : grossly normal strength/tone [Coordination Grossly Intact] : coordination grossly intact [No Focal Deficits] : no focal deficits [Normal Gait] : normal gait [Deep Tendon Reflexes (DTR)] : deep tendon reflexes were 2+ and symmetric [Normal Affect] : the affect was normal [Normal Insight/Judgement] : insight and judgment were intact [Fundoscopic Exam Performed] : fundoscopic ~T exam ~C was performed [de-identified] : skin lesions on extemities, erythematous, pimple appearance, pruritic

## 2021-06-22 ENCOUNTER — NON-APPOINTMENT (OUTPATIENT)
Age: 71
End: 2021-06-22

## 2021-06-29 DIAGNOSIS — R76.8 OTHER SPECIFIED ABNORMAL IMMUNOLOGICAL FINDINGS IN SERUM: ICD-10-CM

## 2021-06-29 DIAGNOSIS — E03.9 HYPOTHYROIDISM, UNSPECIFIED: ICD-10-CM

## 2021-06-29 DIAGNOSIS — R21 RASH AND OTHER NONSPECIFIC SKIN ERUPTION: ICD-10-CM

## 2021-06-29 DIAGNOSIS — E78.5 HYPERLIPIDEMIA, UNSPECIFIED: ICD-10-CM

## 2021-07-12 ENCOUNTER — NON-APPOINTMENT (OUTPATIENT)
Age: 71
End: 2021-07-12

## 2021-07-12 ENCOUNTER — APPOINTMENT (OUTPATIENT)
Dept: OPHTHALMOLOGY | Facility: CLINIC | Age: 71
End: 2021-07-12
Payer: MEDICARE

## 2021-07-12 PROCEDURE — 92012 INTRM OPH EXAM EST PATIENT: CPT

## 2021-07-12 PROCEDURE — 92133 CPTRZD OPH DX IMG PST SGM ON: CPT

## 2021-10-14 ENCOUNTER — OUTPATIENT (OUTPATIENT)
Dept: OUTPATIENT SERVICES | Facility: HOSPITAL | Age: 71
LOS: 1 days | End: 2021-10-14
Payer: MEDICARE

## 2021-10-14 ENCOUNTER — RESULT REVIEW (OUTPATIENT)
Age: 71
End: 2021-10-14

## 2021-10-14 ENCOUNTER — APPOINTMENT (OUTPATIENT)
Dept: RADIOLOGY | Facility: IMAGING CENTER | Age: 71
End: 2021-10-14
Payer: MEDICARE

## 2021-10-14 ENCOUNTER — APPOINTMENT (OUTPATIENT)
Dept: ULTRASOUND IMAGING | Facility: IMAGING CENTER | Age: 71
End: 2021-10-14
Payer: MEDICARE

## 2021-10-14 ENCOUNTER — APPOINTMENT (OUTPATIENT)
Dept: MAMMOGRAPHY | Facility: IMAGING CENTER | Age: 71
End: 2021-10-14
Payer: MEDICARE

## 2021-10-14 DIAGNOSIS — H26.493 OTHER SECONDARY CATARACT, BILATERAL: ICD-10-CM

## 2021-10-14 DIAGNOSIS — H26.9 UNSPECIFIED CATARACT: Chronic | ICD-10-CM

## 2021-10-14 DIAGNOSIS — Z00.00 ENCOUNTER FOR GENERAL ADULT MEDICAL EXAMINATION WITHOUT ABNORMAL FINDINGS: ICD-10-CM

## 2021-10-14 DIAGNOSIS — M85.80 OTHER SPECIFIED DISORDERS OF BONE DENSITY AND STRUCTURE, UNSPECIFIED SITE: ICD-10-CM

## 2021-10-14 DIAGNOSIS — H02.209 UNSPECIFIED LAGOPHTHALMOS UNSPECIFIED EYE, UNSPECIFIED EYELID: Chronic | ICD-10-CM

## 2021-10-14 PROCEDURE — 77080 DXA BONE DENSITY AXIAL: CPT

## 2021-10-14 PROCEDURE — 77063 BREAST TOMOSYNTHESIS BI: CPT | Mod: 26

## 2021-10-14 PROCEDURE — 77080 DXA BONE DENSITY AXIAL: CPT | Mod: 26

## 2021-10-14 PROCEDURE — 77063 BREAST TOMOSYNTHESIS BI: CPT

## 2021-10-14 PROCEDURE — 77067 SCR MAMMO BI INCL CAD: CPT | Mod: 26

## 2021-10-14 PROCEDURE — 77067 SCR MAMMO BI INCL CAD: CPT

## 2021-10-15 ENCOUNTER — NON-APPOINTMENT (OUTPATIENT)
Age: 71
End: 2021-10-15

## 2021-10-15 ENCOUNTER — RX RENEWAL (OUTPATIENT)
Age: 71
End: 2021-10-15

## 2021-10-19 ENCOUNTER — RX RENEWAL (OUTPATIENT)
Age: 71
End: 2021-10-19

## 2021-10-27 ENCOUNTER — NON-APPOINTMENT (OUTPATIENT)
Age: 71
End: 2021-10-27

## 2021-10-28 ENCOUNTER — LABORATORY RESULT (OUTPATIENT)
Age: 71
End: 2021-10-28

## 2021-10-28 ENCOUNTER — RESULT REVIEW (OUTPATIENT)
Age: 71
End: 2021-10-28

## 2021-10-28 ENCOUNTER — APPOINTMENT (OUTPATIENT)
Dept: RHEUMATOLOGY | Facility: CLINIC | Age: 71
End: 2021-10-28
Payer: MEDICARE

## 2021-10-28 VITALS
HEIGHT: 62 IN | SYSTOLIC BLOOD PRESSURE: 152 MMHG | TEMPERATURE: 97.3 F | DIASTOLIC BLOOD PRESSURE: 84 MMHG | OXYGEN SATURATION: 98 % | WEIGHT: 135 LBS | BODY MASS INDEX: 24.84 KG/M2 | HEART RATE: 66 BPM

## 2021-10-28 DIAGNOSIS — T78.1XXD OTHER ADVERSE FOOD REACTIONS, NOT ELSEWHERE CLASSIFIED, SUBSEQUENT ENCOUNTER: ICD-10-CM

## 2021-10-28 PROCEDURE — 99215 OFFICE O/P EST HI 40 MIN: CPT

## 2021-10-28 RX ORDER — ALENDRONATE SODIUM 70 MG/1
70 TABLET ORAL
Qty: 10 | Refills: 1 | Status: DISCONTINUED | COMMUNITY
Start: 2021-10-15 | End: 2021-10-28

## 2021-11-03 ENCOUNTER — OUTPATIENT (OUTPATIENT)
Dept: OUTPATIENT SERVICES | Facility: HOSPITAL | Age: 71
LOS: 1 days | End: 2021-11-03
Payer: MEDICARE

## 2021-11-03 ENCOUNTER — NON-APPOINTMENT (OUTPATIENT)
Age: 71
End: 2021-11-03

## 2021-11-03 ENCOUNTER — APPOINTMENT (OUTPATIENT)
Dept: RADIOLOGY | Facility: IMAGING CENTER | Age: 71
End: 2021-11-03

## 2021-11-03 DIAGNOSIS — M25.511 PAIN IN RIGHT SHOULDER: ICD-10-CM

## 2021-11-03 DIAGNOSIS — M17.10 UNILATERAL PRIMARY OSTEOARTHRITIS, UNSPECIFIED KNEE: ICD-10-CM

## 2021-11-03 DIAGNOSIS — H02.209 UNSPECIFIED LAGOPHTHALMOS UNSPECIFIED EYE, UNSPECIFIED EYELID: Chronic | ICD-10-CM

## 2021-11-03 DIAGNOSIS — H26.9 UNSPECIFIED CATARACT: Chronic | ICD-10-CM

## 2021-11-03 LAB
25(OH)D3 SERPL-MCNC: 26.4 NG/ML
ALBUMIN SERPL ELPH-MCNC: 4.6 G/DL
ALP BLD-CCNC: 104 U/L
ALT SERPL-CCNC: 15 U/L
ANION GAP SERPL CALC-SCNC: 14 MMOL/L
APPEARANCE: CLEAR
AST SERPL-CCNC: 29 U/L
BACTERIA: NEGATIVE
BASOPHILS # BLD AUTO: 0.05 K/UL
BASOPHILS NFR BLD AUTO: 0.7 %
BILIRUB SERPL-MCNC: 0.3 MG/DL
BILIRUBIN URINE: NEGATIVE
BLOOD URINE: ABNORMAL
BUN SERPL-MCNC: 13 MG/DL
C3 SERPL-MCNC: 125 MG/DL
C4 SERPL-MCNC: 29 MG/DL
CALCIUM SERPL-MCNC: 9.7 MG/DL
CENTROMERE IGG SER-ACNC: <0.2 CD:130001892
CHLORIDE SERPL-SCNC: 101 MMOL/L
CK SERPL-CCNC: 79 U/L
CO2 SERPL-SCNC: 23 MMOL/L
COLOR: NORMAL
CREAT SERPL-MCNC: 0.64 MG/DL
CREAT SPEC-SCNC: 47 MG/DL
CREAT/PROT UR: 0.1 RATIO
CRP SERPL-MCNC: 10 MG/L
DSDNA AB SER-ACNC: 18 IU/ML
ENA RNP AB SER IA-ACNC: 0.3 AL
ENA SM AB SER IA-ACNC: <0.2 AL
ENA SS-A AB SER IA-ACNC: <0.2 AL
ENA SS-B AB SER IA-ACNC: <0.2 AL
EOSINOPHIL # BLD AUTO: 0.26 K/UL
EOSINOPHIL NFR BLD AUTO: 3.7 %
G6PD SER-CCNC: 15.1 U/G HGB
GLUCOSE QUALITATIVE U: NEGATIVE
GLUCOSE SERPL-MCNC: 98 MG/DL
HCT VFR BLD CALC: 40.8 %
HGB BLD-MCNC: 12.6 G/DL
HYALINE CASTS: 3 /LPF
IMM GRANULOCYTES NFR BLD AUTO: 0.3 %
KETONES URINE: NEGATIVE
LEUKOCYTE ESTERASE URINE: ABNORMAL
LYMPHOCYTES # BLD AUTO: 1.96 K/UL
LYMPHOCYTES NFR BLD AUTO: 27.6 %
MAN DIFF?: NORMAL
MCHC RBC-ENTMCNC: 29.2 PG
MCHC RBC-ENTMCNC: 30.9 GM/DL
MCV RBC AUTO: 94.7 FL
MICROSCOPIC-UA: NORMAL
MONOCYTES # BLD AUTO: 0.48 K/UL
MONOCYTES NFR BLD AUTO: 6.8 %
NEUTROPHILS # BLD AUTO: 4.34 K/UL
NEUTROPHILS NFR BLD AUTO: 60.9 %
NITRITE URINE: NEGATIVE
PH URINE: 5
PLATELET # BLD AUTO: 337 K/UL
POTASSIUM SERPL-SCNC: 4.8 MMOL/L
PROT SERPL-MCNC: 7.7 G/DL
PROT UR-MCNC: 5 MG/DL
PROTEIN URINE: NEGATIVE
RBC # BLD: 4.31 M/UL
RBC # FLD: 13.7 %
RED BLOOD CELLS URINE: 4 /HPF
SODIUM SERPL-SCNC: 139 MMOL/L
SPECIFIC GRAVITY URINE: 1.01
SQUAMOUS EPITHELIAL CELLS: 5 /HPF
THYROGLOB AB SERPL-ACNC: <20 IU/ML
THYROPEROXIDASE AB SERPL IA-ACNC: <10 IU/ML
UROBILINOGEN URINE: NORMAL
WBC # FLD AUTO: 7.11 K/UL
WHITE BLOOD CELLS URINE: 14 /HPF

## 2021-11-03 PROCEDURE — 73030 X-RAY EXAM OF SHOULDER: CPT | Mod: 26,50

## 2021-11-03 PROCEDURE — 73030 X-RAY EXAM OF SHOULDER: CPT

## 2021-11-04 ENCOUNTER — NON-APPOINTMENT (OUTPATIENT)
Age: 71
End: 2021-11-04

## 2021-12-14 ENCOUNTER — OUTPATIENT (OUTPATIENT)
Dept: OUTPATIENT SERVICES | Facility: HOSPITAL | Age: 71
LOS: 1 days | End: 2021-12-14
Payer: MEDICARE

## 2021-12-14 ENCOUNTER — APPOINTMENT (OUTPATIENT)
Dept: INTERNAL MEDICINE | Facility: CLINIC | Age: 71
End: 2021-12-14
Payer: MEDICARE

## 2021-12-14 VITALS
HEART RATE: 77 BPM | DIASTOLIC BLOOD PRESSURE: 84 MMHG | BODY MASS INDEX: 24.31 KG/M2 | WEIGHT: 132.13 LBS | HEIGHT: 62 IN | SYSTOLIC BLOOD PRESSURE: 144 MMHG | OXYGEN SATURATION: 98 %

## 2021-12-14 DIAGNOSIS — H02.209 UNSPECIFIED LAGOPHTHALMOS UNSPECIFIED EYE, UNSPECIFIED EYELID: Chronic | ICD-10-CM

## 2021-12-14 DIAGNOSIS — H26.9 UNSPECIFIED CATARACT: Chronic | ICD-10-CM

## 2021-12-14 DIAGNOSIS — I10 ESSENTIAL (PRIMARY) HYPERTENSION: ICD-10-CM

## 2021-12-14 DIAGNOSIS — Z23 ENCOUNTER FOR IMMUNIZATION: ICD-10-CM

## 2021-12-14 PROCEDURE — G0463: CPT | Mod: 25

## 2021-12-14 PROCEDURE — G0008: CPT

## 2021-12-14 PROCEDURE — T1013: CPT

## 2021-12-14 PROCEDURE — 90688 IIV4 VACCINE SPLT 0.5 ML IM: CPT

## 2021-12-14 PROCEDURE — 99214 OFFICE O/P EST MOD 30 MIN: CPT | Mod: GC

## 2021-12-15 RX ORDER — VALACYCLOVIR 500 MG/1
500 TABLET, FILM COATED ORAL TWICE DAILY
Qty: 10 | Refills: 0 | Status: DISCONTINUED | COMMUNITY
Start: 2019-05-20 | End: 2021-12-15

## 2021-12-16 LAB
ALBUMIN SERPL ELPH-MCNC: 4.5 G/DL
ALP BLD-CCNC: 97 U/L
ALT SERPL-CCNC: 13 U/L
ANION GAP SERPL CALC-SCNC: 11 MMOL/L
AST SERPL-CCNC: 25 U/L
BASOPHILS # BLD AUTO: 0.04 K/UL
BASOPHILS NFR BLD AUTO: 0.5 %
BILIRUB SERPL-MCNC: 0.2 MG/DL
BUN SERPL-MCNC: 10 MG/DL
CALCIUM SERPL-MCNC: 9.4 MG/DL
CHLORIDE SERPL-SCNC: 98 MMOL/L
CO2 SERPL-SCNC: 25 MMOL/L
CREAT SERPL-MCNC: 0.69 MG/DL
CRP SERPL-MCNC: <3 MG/L
EOSINOPHIL # BLD AUTO: 0.23 K/UL
EOSINOPHIL NFR BLD AUTO: 2.7 %
ERYTHROCYTE [SEDIMENTATION RATE] IN BLOOD BY WESTERGREN METHOD: 41 MM/HR
GLUCOSE SERPL-MCNC: 90 MG/DL
HCT VFR BLD CALC: 38.3 %
HGB BLD-MCNC: 12.1 G/DL
IMM GRANULOCYTES NFR BLD AUTO: 0.2 %
LYMPHOCYTES # BLD AUTO: 2.19 K/UL
LYMPHOCYTES NFR BLD AUTO: 25.3 %
MAN DIFF?: NORMAL
MCHC RBC-ENTMCNC: 29.2 PG
MCHC RBC-ENTMCNC: 31.6 GM/DL
MCV RBC AUTO: 92.5 FL
MONOCYTES # BLD AUTO: 0.68 K/UL
MONOCYTES NFR BLD AUTO: 7.9 %
NEUTROPHILS # BLD AUTO: 5.49 K/UL
NEUTROPHILS NFR BLD AUTO: 63.4 %
PLATELET # BLD AUTO: 331 K/UL
POTASSIUM SERPL-SCNC: 5 MMOL/L
PROT SERPL-MCNC: 7.7 G/DL
RBC # BLD: 4.14 M/UL
RBC # FLD: 13.2 %
SODIUM SERPL-SCNC: 135 MMOL/L
WBC # FLD AUTO: 8.65 K/UL

## 2021-12-16 NOTE — PHYSICAL EXAM
[No Acute Distress] : no acute distress [Well Nourished] : well nourished [Normal Sclera/Conjunctiva] : normal sclera/conjunctiva [PERRL] : pupils equal round and reactive to light [EOMI] : extraocular movements intact [Normal Outer Ear/Nose] : the outer ears and nose were normal in appearance [Normal Oropharynx] : the oropharynx was normal [No JVD] : no jugular venous distention [Supple] : supple [No Respiratory Distress] : no respiratory distress  [No Accessory Muscle Use] : no accessory muscle use [Clear to Auscultation] : lungs were clear to auscultation bilaterally [Normal Rate] : normal rate  [Regular Rhythm] : with a regular rhythm [Normal S1, S2] : normal S1 and S2 [No Murmur] : no murmur heard [No Carotid Bruits] : no carotid bruits [No Edema] : there was no peripheral edema [No Extremity Clubbing/Cyanosis] : no extremity clubbing/cyanosis [Soft] : abdomen soft [Non Tender] : non-tender [Non-distended] : non-distended [No Masses] : no abdominal mass palpated [No HSM] : no HSM [Normal Bowel Sounds] : normal bowel sounds [Normal Posterior Cervical Nodes] : no posterior cervical lymphadenopathy [Normal Anterior Cervical Nodes] : no anterior cervical lymphadenopathy [No CVA Tenderness] : no CVA  tenderness [No Spinal Tenderness] : no spinal tenderness [No Joint Swelling] : no joint swelling [Grossly Normal Strength/Tone] : grossly normal strength/tone [Coordination Grossly Intact] : coordination grossly intact [No Focal Deficits] : no focal deficits [Normal Gait] : normal gait [Normal Affect] : the affect was normal [Normal Insight/Judgement] : insight and judgment were intact [Thyroid Normal, No Nodules] : the thyroid was normal and there were no nodules present [de-identified] : varicosities present [de-identified] : scattered papular rash, some papules in linear distribution, in various stages of healing

## 2021-12-16 NOTE — HISTORY OF PRESENT ILLNESS
[Time Spent: ____ minutes] : Total time spent using  services: [unfilled] minutes. The patient's primary language is not English thus required  services. [FreeTextEntry1] : Rash [Interpreters_IDNumber] : [id number] [Interpreters_FullName] : Zohreh [[id number]] : [place] [de-identified] : 70yo female with history of hypothyroidism, osteoporosis and preDM presents to clinic for complaint of rash. She reports 4-5 week history of papular rash that initially began on lower extremities and has now spread to other areas of her body. Rash is intensely pruritic, relieved only with application of alcohol to lesions. Reports itching is worse at nighttime. She reports she has never experienced this rash before, although on chart review at visit with Dr. Khan in June of 2021 there was a similar complaint. She wears loose and long sleeve pajamas at nighttime with thick socks. Denies lesions below level of socks. Lives with nephew, patient denies nephew has similar symptoms. Reports has not seen bed bugs in house. No recent travel outside of [place]. No pets. Works as .

## 2021-12-16 NOTE — ASSESSMENT
[FreeTextEntry1] : 72yo female with history of hypothyroidism, osteoporosis and preDM presents to clinic for complaint of rash.\par \par //Rash\par Scattered papular rash with some papules in linear distribution. Intensely pruritic. Lesions at different stages of healing. High concern for bed bugs.\par -Triamcinolone cream for spot treatment\par -Oral hydroxyzine PRN intense pruritus\par -Recommended exterminators to patient\par -Derm referral\par -Counseled against using alcohol as spot treatment to prevent drying out of dermis\par -Check CBC w diff, CMP, CRP and ESR for evaluation of other causes\par \par //TSH\par TSH in June 1.04\par -C/w home Synthroid\par -On stable dose, recheck TSH at CPE in 2022\par \par \par //HCM\par -Influenza HD given today\par -In need of PSV23\par \par RTC in 6 months for CPE or sooner PRN\par D/w Dr. Gilliam\par

## 2021-12-20 ENCOUNTER — APPOINTMENT (OUTPATIENT)
Dept: RHEUMATOLOGY | Facility: CLINIC | Age: 71
End: 2021-12-20
Payer: MEDICARE

## 2021-12-20 DIAGNOSIS — R21 RASH AND OTHER NONSPECIFIC SKIN ERUPTION: ICD-10-CM

## 2021-12-20 DIAGNOSIS — E03.9 HYPOTHYROIDISM, UNSPECIFIED: ICD-10-CM

## 2021-12-20 PROCEDURE — 96413 CHEMO IV INFUSION 1 HR: CPT

## 2021-12-21 ENCOUNTER — NON-APPOINTMENT (OUTPATIENT)
Age: 71
End: 2021-12-21

## 2022-01-04 ENCOUNTER — OUTPATIENT (OUTPATIENT)
Dept: OUTPATIENT SERVICES | Facility: HOSPITAL | Age: 72
LOS: 1 days | End: 2022-01-04
Payer: MEDICARE

## 2022-01-04 ENCOUNTER — APPOINTMENT (OUTPATIENT)
Dept: INTERNAL MEDICINE | Facility: CLINIC | Age: 72
End: 2022-01-04
Payer: MEDICARE

## 2022-01-04 DIAGNOSIS — I10 ESSENTIAL (PRIMARY) HYPERTENSION: ICD-10-CM

## 2022-01-04 DIAGNOSIS — J06.9 ACUTE UPPER RESPIRATORY INFECTION, UNSPECIFIED: ICD-10-CM

## 2022-01-04 DIAGNOSIS — H02.209 UNSPECIFIED LAGOPHTHALMOS UNSPECIFIED EYE, UNSPECIFIED EYELID: Chronic | ICD-10-CM

## 2022-01-04 DIAGNOSIS — H26.9 UNSPECIFIED CATARACT: Chronic | ICD-10-CM

## 2022-01-04 PROCEDURE — 99442: CPT

## 2022-01-04 PROCEDURE — G0463: CPT

## 2022-01-05 NOTE — HISTORY OF PRESENT ILLNESS
[Home] : at home, [unfilled] , at the time of the visit. [Verbal consent obtained from patient] : the patient, [unfilled] [Other Location: e.g. Home (Enter Location, City,State)___] : at [unfilled] [Family Member] : family member [Patient Declined  Services] : - None: Patient declined  services [FreeTextEntry8] : 71 F with hx hypothyroidism, osteoporosis and pre-DMT2 who presents for telephonic visit c/o URI symptoms.\par \par Onset yesterday of runny nose, aches, dry sore throat, HA.\par Received the following vaccines: 3 doses of covid-19 including booster on 10/30/21 and flu vaccine on 12/14/21.\par Tmax 99F\par No SOB at all.\par \par Tried to get a COVID-19 PCR test done, but no availabilities in the community.\par On the phone, patient spoke clearly on complete sentences and did not sound out-of-breath.\par \par ROS neg for f/c, CP, SOB, abd pain, n/v/d.

## 2022-01-05 NOTE — PLAN
[FreeTextEntry1] : \par PLAN AS ABOVE IN ASSESSMENT.\par \par RTC as needed.\par \par D/w Dr. Jimenez.\par

## 2022-01-06 ENCOUNTER — NON-APPOINTMENT (OUTPATIENT)
Age: 72
End: 2022-01-06

## 2022-01-06 ENCOUNTER — TRANSCRIPTION ENCOUNTER (OUTPATIENT)
Age: 72
End: 2022-01-06

## 2022-01-06 LAB
FLU A RESULT: NOT DETECTED
FLU B RESULT: NOT DETECTED
RSV RESULT: NOT DETECTED
SARS-COV-2 N GENE NPH QL NAA+PROBE: DETECTED

## 2022-01-10 ENCOUNTER — APPOINTMENT (OUTPATIENT)
Dept: DISASTER EMERGENCY | Facility: HOSPITAL | Age: 72
End: 2022-01-10

## 2022-01-10 ENCOUNTER — APPOINTMENT (OUTPATIENT)
Dept: OPHTHALMOLOGY | Facility: CLINIC | Age: 72
End: 2022-01-10

## 2022-01-10 ENCOUNTER — OUTPATIENT (OUTPATIENT)
Dept: OUTPATIENT SERVICES | Facility: HOSPITAL | Age: 72
LOS: 1 days | End: 2022-01-10

## 2022-01-10 VITALS
DIASTOLIC BLOOD PRESSURE: 80 MMHG | TEMPERATURE: 99 F | SYSTOLIC BLOOD PRESSURE: 128 MMHG | OXYGEN SATURATION: 98 % | HEART RATE: 75 BPM | RESPIRATION RATE: 16 BRPM

## 2022-01-10 VITALS
HEIGHT: 61 IN | OXYGEN SATURATION: 98 % | WEIGHT: 134.92 LBS | DIASTOLIC BLOOD PRESSURE: 79 MMHG | RESPIRATION RATE: 16 BRPM | TEMPERATURE: 98 F | HEART RATE: 77 BPM | SYSTOLIC BLOOD PRESSURE: 138 MMHG

## 2022-01-10 DIAGNOSIS — J06.9 ACUTE UPPER RESPIRATORY INFECTION, UNSPECIFIED: ICD-10-CM

## 2022-01-10 DIAGNOSIS — H26.9 UNSPECIFIED CATARACT: Chronic | ICD-10-CM

## 2022-01-10 DIAGNOSIS — H02.209 UNSPECIFIED LAGOPHTHALMOS UNSPECIFIED EYE, UNSPECIFIED EYELID: Chronic | ICD-10-CM

## 2022-01-10 DIAGNOSIS — U07.1 COVID-19: ICD-10-CM

## 2022-01-10 RX ORDER — SOTROVIMAB 62.5 MG/ML
500 INJECTION, SOLUTION, CONCENTRATE INTRAVENOUS ONCE
Refills: 0 | Status: COMPLETED | OUTPATIENT
Start: 2022-01-10 | End: 2022-01-10

## 2022-01-10 RX ORDER — SODIUM CHLORIDE 9 MG/ML
250 INJECTION INTRAMUSCULAR; INTRAVENOUS; SUBCUTANEOUS
Refills: 0 | Status: DISCONTINUED | OUTPATIENT
Start: 2022-01-10 | End: 2022-01-24

## 2022-01-10 RX ADMIN — SODIUM CHLORIDE 25 MILLILITER(S): 9 INJECTION INTRAMUSCULAR; INTRAVENOUS; SUBCUTANEOUS at 12:46

## 2022-01-10 RX ADMIN — SOTROVIMAB 116 MILLIGRAM(S): 62.5 INJECTION, SOLUTION, CONCENTRATE INTRAVENOUS at 12:46

## 2022-01-31 ENCOUNTER — APPOINTMENT (OUTPATIENT)
Dept: OPHTHALMOLOGY | Facility: CLINIC | Age: 72
End: 2022-01-31

## 2022-02-08 ENCOUNTER — APPOINTMENT (OUTPATIENT)
Dept: INTERNAL MEDICINE | Facility: CLINIC | Age: 72
End: 2022-02-08
Payer: MEDICARE

## 2022-02-08 ENCOUNTER — OUTPATIENT (OUTPATIENT)
Dept: OUTPATIENT SERVICES | Facility: HOSPITAL | Age: 72
LOS: 1 days | End: 2022-02-08
Payer: MEDICARE

## 2022-02-08 ENCOUNTER — NON-APPOINTMENT (OUTPATIENT)
Age: 72
End: 2022-02-08

## 2022-02-08 VITALS
OXYGEN SATURATION: 99 % | BODY MASS INDEX: 24.13 KG/M2 | HEART RATE: 85 BPM | SYSTOLIC BLOOD PRESSURE: 132 MMHG | DIASTOLIC BLOOD PRESSURE: 70 MMHG | WEIGHT: 131.13 LBS | HEIGHT: 62 IN

## 2022-02-08 DIAGNOSIS — H26.9 UNSPECIFIED CATARACT: Chronic | ICD-10-CM

## 2022-02-08 DIAGNOSIS — H02.209 UNSPECIFIED LAGOPHTHALMOS UNSPECIFIED EYE, UNSPECIFIED EYELID: Chronic | ICD-10-CM

## 2022-02-08 DIAGNOSIS — R05.8 OTHER SPECIFIED COUGH: ICD-10-CM

## 2022-02-08 DIAGNOSIS — I10 ESSENTIAL (PRIMARY) HYPERTENSION: ICD-10-CM

## 2022-02-08 PROCEDURE — G0463: CPT

## 2022-02-08 PROCEDURE — 99213 OFFICE O/P EST LOW 20 MIN: CPT | Mod: GE

## 2022-02-08 NOTE — PHYSICAL EXAM
[No Acute Distress] : no acute distress [Well Nourished] : well nourished [Well Developed] : well developed [Well-Appearing] : well-appearing [No Respiratory Distress] : no respiratory distress  [No Accessory Muscle Use] : no accessory muscle use [Clear to Auscultation] : lungs were clear to auscultation bilaterally [Normal Rate] : normal rate  [Regular Rhythm] : with a regular rhythm [Normal S1, S2] : normal S1 and S2 [No Murmur] : no murmur heard [Soft] : abdomen soft [Non Tender] : non-tender [No HSM] : no HSM [Normal Bowel Sounds] : normal bowel sounds

## 2022-02-11 PROBLEM — R05.8 POST-VIRAL COUGH SYNDROME: Status: ACTIVE | Noted: 2022-02-11

## 2022-02-11 NOTE — PLAN
[FreeTextEntry1] : \par # Postviral Cough\par Recent COVID infection 1/5 and monoclonal antibody infusion 1/10\par Dry cough, no fever or chills\par Flonase to help with soreness of throat from likely mucus secretions\par Pt was concerned and wanted an Xray, at this time, given SOB, no fever, or productive cough, and normal lung exam, no current concern for PNA, will hold off on Xray\par If symptoms persist, can consider Xray\par \par Case discussed with Dr. Gilliam\par \par Kelli Hall MD, PGY2\par Internal Medicine

## 2022-02-11 NOTE — HISTORY OF PRESENT ILLNESS
[FreeTextEntry1] : Previous COVID infection and new cough [de-identified] : 70 y/o female with pmh of Hypothyroidism and Osteoporosis who presents to clinic for cough. On 1/4/22 the pt had a telephonic visit with one of the residents in which they stated that they had symptoms of runny nose, aches, sore throat, headache, and Tmax of 99F at the time. They were recommended to get checked for COVID and was found to be positive on 1/5/22. The pt qualified for a monoclonal antibody infusion and therefore received this on 1/10. Today she presents to clinic because she has a persistent cough. Her cough has been nonproductive, no SOB, occasional chest tightness, no fevers, or chills. To note the pt denies taking any medication, only the monoclonal antibody infusion, for relief.

## 2022-02-11 NOTE — REVIEW OF SYSTEMS
[Cough] : cough [Fever] : no fever [Chills] : no chills [Palpitations] : no palpitations [Lower Ext Edema] : no lower extremity edema [Shortness Of Breath] : no shortness of breath [Wheezing] : no wheezing [Abdominal Pain] : no abdominal pain [Nausea] : no nausea [Vomiting] : no vomiting [FreeTextEntry5] : Intermittent chest tightness

## 2022-02-11 NOTE — ASSESSMENT
[FreeTextEntry1] : 72 y/o female with pmh of Hypothyroidism and Osteoporosis who presents to clinic for cough after previous COVID infection.

## 2022-02-17 DIAGNOSIS — R05.8 OTHER SPECIFIED COUGH: ICD-10-CM

## 2022-03-17 DIAGNOSIS — I10 ESSENTIAL (PRIMARY) HYPERTENSION: ICD-10-CM

## 2022-03-18 ENCOUNTER — OUTPATIENT (OUTPATIENT)
Dept: OUTPATIENT SERVICES | Facility: HOSPITAL | Age: 72
LOS: 1 days | End: 2022-03-18
Payer: MEDICARE

## 2022-03-18 ENCOUNTER — APPOINTMENT (OUTPATIENT)
Dept: INTERNAL MEDICINE | Facility: CLINIC | Age: 72
End: 2022-03-18
Payer: MEDICARE

## 2022-03-18 VITALS
WEIGHT: 132 LBS | HEIGHT: 62 IN | SYSTOLIC BLOOD PRESSURE: 130 MMHG | HEART RATE: 75 BPM | DIASTOLIC BLOOD PRESSURE: 90 MMHG | BODY MASS INDEX: 24.29 KG/M2 | OXYGEN SATURATION: 98 %

## 2022-03-18 DIAGNOSIS — I10 ESSENTIAL (PRIMARY) HYPERTENSION: ICD-10-CM

## 2022-03-18 DIAGNOSIS — R21 RASH AND OTHER NONSPECIFIC SKIN ERUPTION: ICD-10-CM

## 2022-03-18 DIAGNOSIS — H26.9 UNSPECIFIED CATARACT: Chronic | ICD-10-CM

## 2022-03-18 DIAGNOSIS — M35.00 SICCA SYNDROME, UNSPECIFIED: Chronic | ICD-10-CM

## 2022-03-18 DIAGNOSIS — H02.209 UNSPECIFIED LAGOPHTHALMOS UNSPECIFIED EYE, UNSPECIFIED EYELID: Chronic | ICD-10-CM

## 2022-03-18 PROCEDURE — G0463: CPT

## 2022-03-18 PROCEDURE — 99213 OFFICE O/P EST LOW 20 MIN: CPT | Mod: GE

## 2022-03-18 RX ORDER — FLUTICASONE PROPIONATE 50 UG/1
50 SPRAY, METERED NASAL DAILY
Qty: 48 | Refills: 0 | Status: DISCONTINUED | COMMUNITY
Start: 2017-06-02 | End: 2022-03-18

## 2022-03-18 NOTE — HISTORY OF PRESENT ILLNESS
[FreeTextEntry1] : Follow up: Postviral cough [de-identified] : 73 y/o female with pmh of Hypothyroidism and Osteoporosis who presents to the clinic for follow up of her postviral cough. The pt had developed a cough after having a COVID infection in 1/5/22 and was seen at the time did not having any SOB at the time, was encouraged to take flonase to help with her soreness of throat from likely mucus secretions.\par Today, the pt is not having any symptoms of cough or SOB, today with concern about rash. Since 10/2021 the pt has a rash, at the time was told likely bed bugs, however received extensive workup and seen by rheumatology who believed it was 2/2 to eczema and recommended the pt see dermatology. After seeing dermatology and medicine she was recommended to use betamethasone cream, ammonia lactate lotion, loratadine, and CeraVe cream. This has helped the pt, however she still has rash since 10/2021 and this concerns her.

## 2022-03-18 NOTE — PHYSICAL EXAM
[No Acute Distress] : no acute distress [Well Nourished] : well nourished [Well Developed] : well developed [Well-Appearing] : well-appearing [No Respiratory Distress] : no respiratory distress  [No Accessory Muscle Use] : no accessory muscle use [Clear to Auscultation] : lungs were clear to auscultation bilaterally [Normal Rate] : normal rate  [Regular Rhythm] : with a regular rhythm [Normal S1, S2] : normal S1 and S2 [No Murmur] : no murmur heard [Soft] : abdomen soft [Non Tender] : non-tender [Non-distended] : non-distended [No HSM] : no HSM [Normal Bowel Sounds] : normal bowel sounds [de-identified] : Vesicles present on b/l LE (right outer thigh, left inner thigh, left lower leg)

## 2022-03-18 NOTE — REVIEW OF SYSTEMS
[Skin Rash] : skin rash [Negative] : Constitutional [Chest Pain] : no chest pain [Palpitations] : no palpitations [Shortness Of Breath] : no shortness of breath [Cough] : no cough [Abdominal Pain] : no abdominal pain [Nausea] : no nausea [Vomiting] : no vomiting [de-identified] : Pruritic

## 2022-03-18 NOTE — ASSESSMENT
[FreeTextEntry1] : 71 y/o female with pmh of Hypothyroidism and Osteoporosis who presents to the clinic for follow up of her postviral cough.

## 2022-03-18 NOTE — PLAN
[FreeTextEntry1] : \par # Rash\par Likely 2/2 to dyshidrotic eczema vs bed bugs\par Vesicle present on right outer thigh, left inner thign, and left leg (some isolated ones, and a few linear present), will some locations that have healed\par Continue on current regimen: betamethasone 0.05% cream, ammonium lactate lotion, loratadine 10 mg QD, and CeraVe cream\par Pt counseled after shower to not pat herself completely dry and apply moisturizer after shower\par Informed pt to completely remove the bed linens from her bed and to wash them and to clean the mattress as well and to try sleeping in alternative bed/couch temporarily \par Dermatology referral provided for follow up\par \par \par # Postviral cough\par Resolved\par \par Case discussed with Dr. Childs\par \par RTC in 2 months for follow up of rash\par \par Kelli Hall MD, PGY2\par Internal Medicine

## 2022-03-23 DIAGNOSIS — R21 RASH AND OTHER NONSPECIFIC SKIN ERUPTION: ICD-10-CM

## 2022-03-23 DIAGNOSIS — M35.00 SJOGREN SYNDROME, UNSPECIFIED: ICD-10-CM

## 2022-04-25 ENCOUNTER — NON-APPOINTMENT (OUTPATIENT)
Age: 72
End: 2022-04-25

## 2022-04-25 ENCOUNTER — APPOINTMENT (OUTPATIENT)
Dept: OPHTHALMOLOGY | Facility: CLINIC | Age: 72
End: 2022-04-25
Payer: MEDICARE

## 2022-04-25 PROCEDURE — 92014 COMPRE OPH EXAM EST PT 1/>: CPT

## 2022-04-25 PROCEDURE — 92133 CPTRZD OPH DX IMG PST SGM ON: CPT

## 2022-05-27 ENCOUNTER — APPOINTMENT (OUTPATIENT)
Dept: INTERNAL MEDICINE | Facility: CLINIC | Age: 72
End: 2022-05-27

## 2022-06-20 ENCOUNTER — APPOINTMENT (OUTPATIENT)
Dept: INTERNAL MEDICINE | Facility: CLINIC | Age: 72
End: 2022-06-20
Payer: MEDICARE

## 2022-06-20 VITALS
BODY MASS INDEX: 25.4 KG/M2 | DIASTOLIC BLOOD PRESSURE: 70 MMHG | HEIGHT: 62 IN | HEART RATE: 80 BPM | WEIGHT: 138 LBS | OXYGEN SATURATION: 98 % | SYSTOLIC BLOOD PRESSURE: 140 MMHG

## 2022-06-20 DIAGNOSIS — N60.19 DIFFUSE CYSTIC MASTOPATHY OF UNSPECIFIED BREAST: ICD-10-CM

## 2022-06-20 DIAGNOSIS — R82.90 UNSPECIFIED ABNORMAL FINDINGS IN URINE: ICD-10-CM

## 2022-06-20 DIAGNOSIS — R52 PAIN, UNSPECIFIED: ICD-10-CM

## 2022-06-20 PROCEDURE — 99397 PER PM REEVAL EST PAT 65+ YR: CPT

## 2022-06-20 PROCEDURE — G0444 DEPRESSION SCREEN ANNUAL: CPT | Mod: 59

## 2022-06-21 LAB
25(OH)D3 SERPL-MCNC: 31.2 NG/ML
ALBUMIN SERPL ELPH-MCNC: 4.5 G/DL
ALP BLD-CCNC: 81 U/L
ALT SERPL-CCNC: 12 U/L
ANION GAP SERPL CALC-SCNC: 9 MMOL/L
APPEARANCE: CLEAR
AST SERPL-CCNC: 24 U/L
BACTERIA: NEGATIVE
BASOPHILS # BLD AUTO: 0.06 K/UL
BASOPHILS NFR BLD AUTO: 0.7 %
BILIRUB SERPL-MCNC: 0.2 MG/DL
BILIRUBIN URINE: NEGATIVE
BLOOD URINE: NEGATIVE
BUN SERPL-MCNC: 15 MG/DL
CALCIUM SERPL-MCNC: 9.5 MG/DL
CHLORIDE SERPL-SCNC: 103 MMOL/L
CHOLEST SERPL-MCNC: 199 MG/DL
CO2 SERPL-SCNC: 27 MMOL/L
COLOR: COLORLESS
CREAT SERPL-MCNC: 0.7 MG/DL
EGFR: 92 ML/MIN/1.73M2
EOSINOPHIL # BLD AUTO: 0.28 K/UL
EOSINOPHIL NFR BLD AUTO: 3.4 %
ESTIMATED AVERAGE GLUCOSE: 120 MG/DL
GLUCOSE QUALITATIVE U: NEGATIVE
GLUCOSE SERPL-MCNC: 87 MG/DL
HBA1C MFR BLD HPLC: 5.8 %
HCT VFR BLD CALC: 38.5 %
HDLC SERPL-MCNC: 56 MG/DL
HGB BLD-MCNC: 12.4 G/DL
HYALINE CASTS: 0 /LPF
IMM GRANULOCYTES NFR BLD AUTO: 0.1 %
KETONES URINE: NEGATIVE
LDLC SERPL CALC-MCNC: 119 MG/DL
LEUKOCYTE ESTERASE URINE: NEGATIVE
LYMPHOCYTES # BLD AUTO: 3.05 K/UL
LYMPHOCYTES NFR BLD AUTO: 36.9 %
MAN DIFF?: NORMAL
MCHC RBC-ENTMCNC: 29.2 PG
MCHC RBC-ENTMCNC: 32.2 GM/DL
MCV RBC AUTO: 90.8 FL
MICROSCOPIC-UA: NORMAL
MONOCYTES # BLD AUTO: 0.79 K/UL
MONOCYTES NFR BLD AUTO: 9.6 %
NEUTROPHILS # BLD AUTO: 4.08 K/UL
NEUTROPHILS NFR BLD AUTO: 49.3 %
NITRITE URINE: NEGATIVE
NONHDLC SERPL-MCNC: 143 MG/DL
PH URINE: 6.5
PLATELET # BLD AUTO: 281 K/UL
POTASSIUM SERPL-SCNC: 5.8 MMOL/L
PROT SERPL-MCNC: 7.9 G/DL
PROTEIN URINE: NEGATIVE
RBC # BLD: 4.24 M/UL
RBC # FLD: 13.2 %
RED BLOOD CELLS URINE: 3 /HPF
SODIUM SERPL-SCNC: 139 MMOL/L
SPECIFIC GRAVITY URINE: 1.01
SQUAMOUS EPITHELIAL CELLS: 2 /HPF
T4 FREE SERPL-MCNC: 1.3 NG/DL
TRIGL SERPL-MCNC: 120 MG/DL
TSH SERPL-ACNC: 0.48 UIU/ML
UROBILINOGEN URINE: NORMAL
VIT B12 SERPL-MCNC: 573 PG/ML
WBC # FLD AUTO: 8.27 K/UL
WHITE BLOOD CELLS URINE: 1 /HPF

## 2022-06-21 NOTE — HEALTH RISK ASSESSMENT
[Never] : Never [Yes] : Yes [Monthly or less (1 pt)] : Monthly or less (1 point) [No falls in past year] : Patient reported no falls in the past year [0] : 2) Feeling down, depressed, or hopeless: Not at all (0) [PHQ-2 Negative - No further assessment needed] : PHQ-2 Negative - No further assessment needed [de-identified] : rare [Audit-CScore] : 1 [[id number]] : 0

## 2022-06-21 NOTE — REVIEW OF SYSTEMS
[Constipation] : constipation [Negative] : Heme/Lymph [Itching] : Itching [Skin Rash] : no skin rash [de-identified] : dry skin

## 2022-06-21 NOTE — HISTORY OF PRESENT ILLNESS
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

## 2022-06-23 ENCOUNTER — APPOINTMENT (OUTPATIENT)
Dept: INTERNAL MEDICINE | Facility: CLINIC | Age: 72
End: 2022-06-23
Payer: MEDICARE

## 2022-06-23 ENCOUNTER — NON-APPOINTMENT (OUTPATIENT)
Age: 72
End: 2022-06-23

## 2022-06-23 VITALS — SYSTOLIC BLOOD PRESSURE: 140 MMHG | DIASTOLIC BLOOD PRESSURE: 80 MMHG

## 2022-06-23 DIAGNOSIS — E87.5 HYPERKALEMIA: ICD-10-CM

## 2022-06-23 LAB
BACTERIA UR CULT: NORMAL
POTASSIUM SERPL-SCNC: 3.8 MMOL/L
POTASSIUM SERPL-SCNC: 5.9 MMOL/L

## 2022-06-23 PROCEDURE — 99213 OFFICE O/P EST LOW 20 MIN: CPT | Mod: 25

## 2022-06-23 PROCEDURE — 93000 ELECTROCARDIOGRAM COMPLETE: CPT

## 2022-06-23 RX ORDER — TIMOLOL MALEATE 5 MG/ML
0.5 SOLUTION OPHTHALMIC
Qty: 1 | Refills: 5 | Status: DISCONTINUED | COMMUNITY
Start: 2018-10-18 | End: 2022-06-23

## 2022-06-23 RX ORDER — DOCUSATE SODIUM 100 MG
100 TABLET ORAL DAILY
Qty: 270 | Refills: 3 | Status: DISCONTINUED | COMMUNITY
Start: 2021-04-05 | End: 2022-06-23

## 2022-06-23 RX ORDER — LISINOPRIL 2.5 MG/1
2.5 TABLET ORAL
Qty: 90 | Refills: 0 | Status: DISCONTINUED | COMMUNITY
Start: 2021-06-16 | End: 2022-06-23

## 2022-06-24 LAB — BACTERIA UR CULT: NORMAL

## 2022-06-27 PROBLEM — E87.5 HYPERKALEMIA: Status: ACTIVE | Noted: 2022-06-22

## 2022-06-27 NOTE — HISTORY OF PRESENT ILLNESS
[FreeTextEntry8] : Asked to come in to repeat elevated potassium, twice elevated with normal renal fx. Was on AGE/Lisinopril 2.5mg in June 2021 for  short period x few weeks-GI upset and has not taken it for almost a year. Normal K on all prior labs.\par No hx DM. Normal renal function and CBC. Diet reviewed-no salt substitites. Urinating well-hx microscopic hematuria, with negative urine cx and asymptomatic. No palpitaions, dizziness, chest pain\par EKG showed no peak T waves\par Possible pseudohyperkaemia

## 2022-07-18 ENCOUNTER — APPOINTMENT (OUTPATIENT)
Dept: OBGYN | Facility: CLINIC | Age: 72
End: 2022-07-18

## 2022-07-18 VITALS — DIASTOLIC BLOOD PRESSURE: 82 MMHG | SYSTOLIC BLOOD PRESSURE: 133 MMHG

## 2022-07-18 DIAGNOSIS — Z12.4 ENCOUNTER FOR SCREENING FOR MALIGNANT NEOPLASM OF CERVIX: ICD-10-CM

## 2022-07-18 PROCEDURE — G0101: CPT

## 2022-07-18 NOTE — HISTORY OF PRESENT ILLNESS
[Patient reported mammogram was normal] : Patient reported mammogram was normal [Patient reported PAP Smear was normal] : Patient reported PAP Smear was normal [Patient reported bone density results were normal] : Patient reported bone density results were normal [Patient reported colonoscopy was normal] : Patient reported colonoscopy was normal [FreeTextEntry1] : 73YO  LMP 2000 s/p supracerv hyst/bladder suspension presents for checkup without complaints. [Mammogramdate] : 10/21 [PapSmeardate] : 2019 [TextBox_37] : Tfem neck=-1.4 [BoneDensityDate] : 10/21 [ColonoscopyDate] : 9/21

## 2022-07-18 NOTE — PHYSICAL EXAM
[Chaperone Present] : A chaperone was present in the examining room during all aspects of the physical examination [Appropriately responsive] : appropriately responsive [Alert] : alert [No Acute Distress] : no acute distress [No Lymphadenopathy] : no lymphadenopathy [Regular Rate Rhythm] : regular rate rhythm [No Murmurs] : no murmurs [Clear to Auscultation B/L] : clear to auscultation bilaterally [Soft] : soft [Non-tender] : non-tender [Non-distended] : non-distended [No HSM] : No HSM [No Lesions] : no lesions [No Mass] : no mass [Oriented x3] : oriented x3 [Examination Of The Breasts] : a normal appearance [No Masses] : no breast masses were palpable [Labia Majora] : normal [Labia Minora] : normal [Atrophy] : atrophy [No Bleeding] : There was no active vaginal bleeding [Normal] : normal [Absent] : absent [Uterine Adnexae] : normal

## 2022-07-20 ENCOUNTER — OUTPATIENT (OUTPATIENT)
Dept: OUTPATIENT SERVICES | Facility: HOSPITAL | Age: 72
LOS: 1 days | End: 2022-07-20
Payer: MEDICARE

## 2022-07-20 ENCOUNTER — APPOINTMENT (OUTPATIENT)
Dept: RADIOLOGY | Facility: IMAGING CENTER | Age: 72
End: 2022-07-20

## 2022-07-20 DIAGNOSIS — H02.209 UNSPECIFIED LAGOPHTHALMOS UNSPECIFIED EYE, UNSPECIFIED EYELID: Chronic | ICD-10-CM

## 2022-07-20 DIAGNOSIS — R05.9 COUGH, UNSPECIFIED: ICD-10-CM

## 2022-07-20 DIAGNOSIS — H26.9 UNSPECIFIED CATARACT: Chronic | ICD-10-CM

## 2022-07-20 PROCEDURE — 71046 X-RAY EXAM CHEST 2 VIEWS: CPT | Mod: 26

## 2022-07-20 PROCEDURE — 71046 X-RAY EXAM CHEST 2 VIEWS: CPT

## 2022-07-25 LAB — CYTOLOGY CVX/VAG DOC THIN PREP: ABNORMAL

## 2022-08-16 ENCOUNTER — NON-APPOINTMENT (OUTPATIENT)
Age: 72
End: 2022-08-16

## 2022-09-19 ENCOUNTER — APPOINTMENT (OUTPATIENT)
Dept: OPHTHALMOLOGY | Facility: CLINIC | Age: 72
End: 2022-09-19

## 2022-09-19 ENCOUNTER — NON-APPOINTMENT (OUTPATIENT)
Age: 72
End: 2022-09-19

## 2022-09-19 PROCEDURE — 92012 INTRM OPH EXAM EST PATIENT: CPT

## 2022-10-13 PROBLEM — Z91.040 LATEX SENSITIVITY: Status: ACTIVE | Noted: 2017-06-02

## 2022-10-15 ENCOUNTER — RESULT REVIEW (OUTPATIENT)
Age: 72
End: 2022-10-15

## 2022-10-15 ENCOUNTER — OUTPATIENT (OUTPATIENT)
Dept: OUTPATIENT SERVICES | Facility: HOSPITAL | Age: 72
LOS: 1 days | End: 2022-10-15
Payer: MEDICARE

## 2022-10-15 ENCOUNTER — APPOINTMENT (OUTPATIENT)
Dept: MAMMOGRAPHY | Facility: IMAGING CENTER | Age: 72
End: 2022-10-15

## 2022-10-15 DIAGNOSIS — H26.9 UNSPECIFIED CATARACT: Chronic | ICD-10-CM

## 2022-10-15 DIAGNOSIS — Z00.00 ENCOUNTER FOR GENERAL ADULT MEDICAL EXAMINATION WITHOUT ABNORMAL FINDINGS: ICD-10-CM

## 2022-10-15 DIAGNOSIS — H02.209 UNSPECIFIED LAGOPHTHALMOS UNSPECIFIED EYE, UNSPECIFIED EYELID: Chronic | ICD-10-CM

## 2022-10-15 PROCEDURE — 77063 BREAST TOMOSYNTHESIS BI: CPT

## 2022-10-15 PROCEDURE — 77063 BREAST TOMOSYNTHESIS BI: CPT | Mod: 26

## 2022-10-15 PROCEDURE — 77067 SCR MAMMO BI INCL CAD: CPT | Mod: 26

## 2022-10-15 PROCEDURE — 77067 SCR MAMMO BI INCL CAD: CPT

## 2022-10-24 ENCOUNTER — APPOINTMENT (OUTPATIENT)
Dept: RHEUMATOLOGY | Facility: CLINIC | Age: 72
End: 2022-10-24

## 2022-10-24 VITALS
RESPIRATION RATE: 16 BRPM | OXYGEN SATURATION: 98 % | HEIGHT: 62 IN | TEMPERATURE: 97.2 F | BODY MASS INDEX: 24.84 KG/M2 | SYSTOLIC BLOOD PRESSURE: 135 MMHG | DIASTOLIC BLOOD PRESSURE: 70 MMHG | HEART RATE: 78 BPM | WEIGHT: 135 LBS

## 2022-10-24 DIAGNOSIS — R76.8 OTHER SPECIFIED ABNORMAL IMMUNOLOGICAL FINDINGS IN SERUM: ICD-10-CM

## 2022-10-24 PROCEDURE — 99214 OFFICE O/P EST MOD 30 MIN: CPT

## 2022-11-09 LAB
25(OH)D3 SERPL-MCNC: 29.8 NG/ML
ALBUMIN SERPL ELPH-MCNC: 4.2 G/DL
ALP BLD-CCNC: 86 U/L
ALT SERPL-CCNC: 11 U/L
ANA PAT FLD IF-IMP: ABNORMAL
ANA SER IF-ACNC: ABNORMAL
ANION GAP SERPL CALC-SCNC: 8 MMOL/L
APPEARANCE: CLEAR
APTT BLD: 31 SEC
AST SERPL-CCNC: 24 U/L
B2 GLYCOPROT1 AB SER QL: NEGATIVE
BACTERIA: NEGATIVE
BASOPHILS # BLD AUTO: 0.06 K/UL
BASOPHILS NFR BLD AUTO: 0.8 %
BILIRUB SERPL-MCNC: <0.2 MG/DL
BILIRUBIN URINE: NEGATIVE
BLOOD URINE: NEGATIVE
BUN SERPL-MCNC: 12 MG/DL
CALCIUM SERPL-MCNC: 9.7 MG/DL
CARDIOLIPIN AB SER IA-ACNC: NEGATIVE
CENTROMERE IGG SER-ACNC: <0.2 CD:130001892
CHLORIDE SERPL-SCNC: 104 MMOL/L
CO2 SERPL-SCNC: 29 MMOL/L
COLOR: NORMAL
CREAT SERPL-MCNC: 0.72 MG/DL
CREAT SPEC-SCNC: 55 MG/DL
CREAT/PROT UR: 0.1 RATIO
CRP SERPL-MCNC: <3 MG/L
DSDNA AB SER-ACNC: 23 IU/ML
EGFR: 89 ML/MIN/1.73M2
ENA RNP AB SER IA-ACNC: 0.2 AL
ENA SCL70 IGG SER IA-ACNC: <0.2 AL
ENA SM AB SER IA-ACNC: <0.2 AL
ENA SS-A AB SER IA-ACNC: <0.2 AL
ENA SS-B AB SER IA-ACNC: <0.2 AL
EOSINOPHIL # BLD AUTO: 0.19 K/UL
EOSINOPHIL NFR BLD AUTO: 2.5 %
ERYTHROCYTE [SEDIMENTATION RATE] IN BLOOD BY WESTERGREN METHOD: 23 MM/HR
GLUCOSE QUALITATIVE U: NEGATIVE
HCT VFR BLD CALC: 38.4 %
HGB BLD-MCNC: 12.3 G/DL
HYALINE CASTS: 0 /LPF
IMM GRANULOCYTES NFR BLD AUTO: 0.1 %
KETONES URINE: NEGATIVE
LEUKOCYTE ESTERASE URINE: ABNORMAL
LYMPHOCYTES # BLD AUTO: 2.92 K/UL
LYMPHOCYTES NFR BLD AUTO: 39.1 %
MAN DIFF?: NORMAL
MCHC RBC-ENTMCNC: 29.4 PG
MCHC RBC-ENTMCNC: 32 GM/DL
MCV RBC AUTO: 91.6 FL
MICROSCOPIC-UA: NORMAL
MONOCYTES # BLD AUTO: 0.66 K/UL
MONOCYTES NFR BLD AUTO: 8.8 %
NEUTROPHILS # BLD AUTO: 3.62 K/UL
NEUTROPHILS NFR BLD AUTO: 48.7 %
NITRITE URINE: NEGATIVE
PH URINE: 5.5
PLATELET # BLD AUTO: 306 K/UL
POTASSIUM SERPL-SCNC: 5.1 MMOL/L
PROT SERPL-MCNC: 7.5 G/DL
PROT UR-MCNC: 5 MG/DL
PROTEIN URINE: NEGATIVE
RBC # BLD: 4.19 M/UL
RBC # FLD: 13.7 %
RED BLOOD CELLS URINE: 5 /HPF
RNA POLYMERASE III IGG: 13 UNITS
SODIUM SERPL-SCNC: 140 MMOL/L
SPECIFIC GRAVITY URINE: 1.01
SQUAMOUS EPITHELIAL CELLS: 1 /HPF
THYROGLOB AB SERPL-ACNC: <20 IU/ML
THYROPEROXIDASE AB SERPL IA-ACNC: <10 IU/ML
UROBILINOGEN URINE: NORMAL
WBC # FLD AUTO: 7.46 K/UL
WHITE BLOOD CELLS URINE: 2 /HPF

## 2022-11-10 ENCOUNTER — NON-APPOINTMENT (OUTPATIENT)
Age: 72
End: 2022-11-10

## 2022-12-22 ENCOUNTER — NON-APPOINTMENT (OUTPATIENT)
Age: 72
End: 2022-12-22

## 2022-12-22 DIAGNOSIS — J11.1 INFLUENZA DUE TO UNIDENTIFIED INFLUENZA VIRUS WITH OTHER RESPIRATORY MANIFESTATIONS: ICD-10-CM

## 2022-12-26 DIAGNOSIS — U07.1 COVID-19: ICD-10-CM

## 2022-12-26 RX ORDER — OSELTAMIVIR PHOSPHATE 75 MG/1
75 CAPSULE ORAL
Qty: 10 | Refills: 0 | Status: DISCONTINUED | COMMUNITY
Start: 2022-12-22 | End: 2022-12-26

## 2022-12-27 LAB
INFLUENZA A RESULT: NOT DETECTED
INFLUENZA B RESULT: NOT DETECTED
RESP SYN VIRUS RESULT: NOT DETECTED
SARS-COV-2 RESULT: DETECTED

## 2023-01-21 ENCOUNTER — OUTPATIENT (OUTPATIENT)
Dept: OUTPATIENT SERVICES | Facility: HOSPITAL | Age: 73
LOS: 1 days | End: 2023-01-21
Payer: MEDICARE

## 2023-01-21 ENCOUNTER — APPOINTMENT (OUTPATIENT)
Dept: RADIOLOGY | Facility: IMAGING CENTER | Age: 73
End: 2023-01-21
Payer: MEDICARE

## 2023-01-21 DIAGNOSIS — R05.3 CHRONIC COUGH: ICD-10-CM

## 2023-01-21 DIAGNOSIS — H26.9 UNSPECIFIED CATARACT: Chronic | ICD-10-CM

## 2023-01-21 DIAGNOSIS — U07.1 COVID-19: ICD-10-CM

## 2023-01-21 DIAGNOSIS — H02.209 UNSPECIFIED LAGOPHTHALMOS UNSPECIFIED EYE, UNSPECIFIED EYELID: Chronic | ICD-10-CM

## 2023-01-21 PROCEDURE — 71046 X-RAY EXAM CHEST 2 VIEWS: CPT | Mod: 26

## 2023-01-21 PROCEDURE — 71046 X-RAY EXAM CHEST 2 VIEWS: CPT

## 2023-01-23 ENCOUNTER — APPOINTMENT (OUTPATIENT)
Dept: INTERNAL MEDICINE | Facility: CLINIC | Age: 73
End: 2023-01-23
Payer: MEDICARE

## 2023-01-23 VITALS
DIASTOLIC BLOOD PRESSURE: 70 MMHG | OXYGEN SATURATION: 98 % | SYSTOLIC BLOOD PRESSURE: 140 MMHG | HEART RATE: 82 BPM | BODY MASS INDEX: 25.21 KG/M2 | HEIGHT: 62 IN | WEIGHT: 137 LBS

## 2023-01-23 DIAGNOSIS — M81.0 AGE-RELATED OSTEOPOROSIS W/OUT CURRENT PATHOLOGICAL FRACTURE: ICD-10-CM

## 2023-01-23 DIAGNOSIS — E03.9 HYPOTHYROIDISM, UNSPECIFIED: ICD-10-CM

## 2023-01-23 PROCEDURE — 99214 OFFICE O/P EST MOD 30 MIN: CPT

## 2023-02-21 NOTE — PHYSICAL EXAM
[No Acute Distress] : no acute distress [Well Nourished] : well nourished [Well Developed] : well developed [Well-Appearing] : well-appearing [Normal Sclera/Conjunctiva] : normal sclera/conjunctiva [PERRL] : pupils equal round and reactive to light [EOMI] : extraocular movements intact [Normal Outer Ear/Nose] : the outer ears and nose were normal in appearance [Normal Oropharynx] : the oropharynx was normal [No JVD] : no jugular venous distention [No Lymphadenopathy] : no lymphadenopathy [Supple] : supple [Thyroid Normal, No Nodules] : the thyroid was normal and there were no nodules present [No Respiratory Distress] : no respiratory distress  [No Accessory Muscle Use] : no accessory muscle use [Clear to Auscultation] : lungs were clear to auscultation bilaterally [Normal Rate] : normal rate  [Regular Rhythm] : with a regular rhythm [Normal S1, S2] : normal S1 and S2 [No Murmur] : no murmur heard [No Carotid Bruits] : no carotid bruits [No Abdominal Bruit] : a ~M bruit was not heard ~T in the abdomen [No Varicosities] : no varicosities [Pedal Pulses Present] : the pedal pulses are present [No Edema] : there was no peripheral edema [No Palpable Aorta] : no palpable aorta [No Extremity Clubbing/Cyanosis] : no extremity clubbing/cyanosis [Normal Appearance] : normal in appearance [No Nipple Discharge] : no nipple discharge [No Axillary Lymphadenopathy] : no axillary lymphadenopathy [Soft] : abdomen soft [Non Tender] : non-tender [Non-distended] : non-distended [No Masses] : no abdominal mass palpated [No HSM] : no HSM [Normal Bowel Sounds] : normal bowel sounds [No CVA Tenderness] : no CVA  tenderness [No Spinal Tenderness] : no spinal tenderness [No Joint Swelling] : no joint swelling [Grossly Normal Strength/Tone] : grossly normal strength/tone [No Rash] : no rash [Coordination Grossly Intact] : coordination grossly intact [No Focal Deficits] : no focal deficits [Normal Gait] : normal gait [Deep Tendon Reflexes (DTR)] : deep tendon reflexes were 2+ and symmetric [Normal Affect] : the affect was normal [Normal Insight/Judgement] : insight and judgment were intact

## 2023-02-28 PROBLEM — M81.0 OSTEOPOROSIS: Status: ACTIVE | Noted: 2021-10-15

## 2023-02-28 NOTE — HEALTH RISK ASSESSMENT
[Yes] : Yes [Monthly or less (1 pt)] : Monthly or less (1 point) [No falls in past year] : Patient reported no falls in the past year [0] : 2) Feeling down, depressed, or hopeless: Not at all (0) [PHQ-2 Negative - No further assessment needed] : PHQ-2 Negative - No further assessment needed [de-identified] : rare [Audit-CScore] : 1 [[id number]] : 0

## 2023-02-28 NOTE — REVIEW OF SYSTEMS
[Constipation] : constipation [Itching] : Itching [Negative] : Heme/Lymph [Skin Rash] : no skin rash [de-identified] : dry skin

## 2023-02-28 NOTE — HISTORY OF PRESENT ILLNESS
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

## 2023-04-17 ENCOUNTER — NON-APPOINTMENT (OUTPATIENT)
Age: 73
End: 2023-04-17

## 2023-04-17 ENCOUNTER — APPOINTMENT (OUTPATIENT)
Dept: OPHTHALMOLOGY | Facility: CLINIC | Age: 73
End: 2023-04-17
Payer: MEDICARE

## 2023-04-17 PROCEDURE — 92012 INTRM OPH EXAM EST PATIENT: CPT

## 2023-04-17 PROCEDURE — 92083 EXTENDED VISUAL FIELD XM: CPT

## 2023-04-17 PROCEDURE — 92133 CPTRZD OPH DX IMG PST SGM ON: CPT

## 2023-04-24 ENCOUNTER — APPOINTMENT (OUTPATIENT)
Dept: GASTROENTEROLOGY | Facility: CLINIC | Age: 73
End: 2023-04-24
Payer: MEDICARE

## 2023-04-24 VITALS
WEIGHT: 136 LBS | HEART RATE: 73 BPM | HEIGHT: 62 IN | BODY MASS INDEX: 25.03 KG/M2 | SYSTOLIC BLOOD PRESSURE: 133 MMHG | DIASTOLIC BLOOD PRESSURE: 66 MMHG

## 2023-04-24 DIAGNOSIS — K59.00 CONSTIPATION, UNSPECIFIED: ICD-10-CM

## 2023-04-24 DIAGNOSIS — K21.9 GASTRO-ESOPHAGEAL REFLUX DISEASE W/OUT ESOPHAGITIS: ICD-10-CM

## 2023-04-24 DIAGNOSIS — R14.0 ABDOMINAL DISTENSION (GASEOUS): ICD-10-CM

## 2023-04-24 PROCEDURE — 99214 OFFICE O/P EST MOD 30 MIN: CPT

## 2023-04-24 RX ORDER — LACTULOSE 10 G/15ML
10 SOLUTION ORAL DAILY
Qty: 1 | Refills: 3 | Status: ACTIVE | COMMUNITY
Start: 2023-04-24 | End: 1900-01-01

## 2023-04-24 RX ORDER — ZOLEDRONIC ACID 5 MG/100ML
5 INJECTION INTRAVENOUS
Qty: 100 | Refills: 0 | Status: DISCONTINUED | COMMUNITY
Start: 2021-10-28 | End: 2023-04-24

## 2023-04-24 RX ORDER — POLYETHYLENE GLYCOL 3350 17 G/17G
17 POWDER, FOR SOLUTION ORAL DAILY
Qty: 14 | Refills: 0 | Status: DISCONTINUED | COMMUNITY
Start: 2019-03-20 | End: 2023-04-24

## 2023-04-24 RX ORDER — TRIAMCINOLONE ACETONIDE 1 MG/G
0.1 CREAM TOPICAL 3 TIMES DAILY
Qty: 1 | Refills: 0 | Status: DISCONTINUED | COMMUNITY
Start: 2021-12-14 | End: 2023-04-24

## 2023-04-24 RX ORDER — TRIAMCINOLONE ACETONIDE 1 MG/G
0.1 CREAM TOPICAL
Qty: 1 | Refills: 0 | Status: DISCONTINUED | COMMUNITY
Start: 2021-06-16 | End: 2023-04-24

## 2023-04-24 RX ORDER — CALCIUM CARBONATE/VITAMIN D3 500 MG-10
500-400 TABLET ORAL
Qty: 180 | Refills: 0 | Status: DISCONTINUED | COMMUNITY
Start: 2021-10-28 | End: 2023-04-24

## 2023-04-24 RX ORDER — HYDROXYZINE HYDROCHLORIDE 25 MG/1
25 TABLET ORAL
Qty: 30 | Refills: 1 | Status: DISCONTINUED | COMMUNITY
Start: 2021-12-14 | End: 2023-04-24

## 2023-04-24 RX ORDER — NIRMATRELVIR AND RITONAVIR 300-100 MG
20 X 150 MG & KIT ORAL
Qty: 30 | Refills: 0 | Status: DISCONTINUED | COMMUNITY
Start: 2022-12-26 | End: 2023-04-24

## 2023-04-24 RX ORDER — FAMOTIDINE 20 MG/1
20 TABLET, FILM COATED ORAL
Qty: 90 | Refills: 3 | Status: ACTIVE | COMMUNITY
Start: 2021-04-05 | End: 1900-01-01

## 2023-04-24 RX ORDER — PANTOPRAZOLE 20 MG/1
20 TABLET, DELAYED RELEASE ORAL
Qty: 90 | Refills: 1 | Status: DISCONTINUED | COMMUNITY
Start: 2021-05-28 | End: 2023-04-24

## 2023-04-24 NOTE — REVIEW OF SYSTEMS
[Chills] : chills [Feeling Tired] : feeling tired [Abdominal Pain] : abdominal pain [Constipation] : constipation [Heartburn] : heartburn [Pelvic Pain] : pelvic pain [Arthralgias (joint pain)] : arthralgias [Itching] : itching [Depression] : depression [Muscle Weakness] : muscle weakness [Easy Bruising] : a tendency for easy bruising [Swollen Glands] : swollen glands [Negative] : Neurological [As Noted in HPI] : as noted in HPI [FreeTextEntry3] : Dry eyes

## 2023-04-24 NOTE — REASON FOR VISIT
[Follow-up] : a follow-up of an existing diagnosis [FreeTextEntry1] : Severe constipation along with mid upper abdominal pain

## 2023-04-27 ENCOUNTER — APPOINTMENT (OUTPATIENT)
Dept: RADIOLOGY | Facility: IMAGING CENTER | Age: 73
End: 2023-04-27
Payer: MEDICARE

## 2023-04-27 ENCOUNTER — OUTPATIENT (OUTPATIENT)
Dept: OUTPATIENT SERVICES | Facility: HOSPITAL | Age: 73
LOS: 1 days | End: 2023-04-27
Payer: MEDICARE

## 2023-04-27 DIAGNOSIS — H26.9 UNSPECIFIED CATARACT: Chronic | ICD-10-CM

## 2023-04-27 DIAGNOSIS — R14.0 ABDOMINAL DISTENSION (GASEOUS): ICD-10-CM

## 2023-04-27 DIAGNOSIS — H02.209 UNSPECIFIED LAGOPHTHALMOS UNSPECIFIED EYE, UNSPECIFIED EYELID: Chronic | ICD-10-CM

## 2023-04-27 PROCEDURE — 74019 RADEX ABDOMEN 2 VIEWS: CPT

## 2023-04-27 PROCEDURE — 74019 RADEX ABDOMEN 2 VIEWS: CPT | Mod: 26

## 2023-05-16 ENCOUNTER — APPOINTMENT (OUTPATIENT)
Dept: ORTHOPEDIC SURGERY | Facility: CLINIC | Age: 73
End: 2023-05-16
Payer: MEDICARE

## 2023-05-16 DIAGNOSIS — G89.29 PAIN IN LEFT KNEE: ICD-10-CM

## 2023-05-16 DIAGNOSIS — M25.562 PAIN IN LEFT KNEE: ICD-10-CM

## 2023-05-16 PROCEDURE — 99204 OFFICE O/P NEW MOD 45 MIN: CPT | Mod: 25

## 2023-05-16 PROCEDURE — 20610 DRAIN/INJ JOINT/BURSA W/O US: CPT | Mod: LT

## 2023-05-16 PROCEDURE — 73564 X-RAY EXAM KNEE 4 OR MORE: CPT | Mod: 50

## 2023-05-17 PROBLEM — M25.562 CHRONIC PAIN OF LEFT KNEE: Status: ACTIVE | Noted: 2017-09-27

## 2023-05-17 NOTE — HISTORY OF PRESENT ILLNESS
[de-identified] : 73 year old female presents today with worsening of acute on acute on chronic knee pain acute on chronic acute chronic  bilateral knee pain. L>R, patient is currently being evaluated with Rheumatologic workup. Patient reports recent labs depicted she is EZRA positive. Patient was seen in 2017 for knee OA, was give steroid injectiod in the left knee in 2017 providing her with symptom relief until 6 months ago. Patient reports diffused knee pain L>R. Pain is chronic and worse with walking and stairs. C/O stiffness, associated swelling. She is taking Tylenol for pain, unable to tolerate NSAIDs due to GERD. \par \par The patient's past medical history, past surgical history, medications and allergies were reviewed by me today with the patient and documented accordingly. In addition, the patient's family and social history, which were noncontributory to this visit, were reviewed also.  Left

## 2023-05-17 NOTE — DISCUSSION/SUMMARY
[de-identified] : 72 yo female with Acute on chronic bilateral knee pain\par \par Patient presents for evaluation of bilateral knee pain.  Symptoms are consistent with degenerative changes as seen on x-ray imaging.  I discussed the treatment of degenerative arthritis with the patient at length today, as well as the chronic degenerative process and likely future progression of the disease. Pain may be related to the bony degenerative changes seen on XR imaging, or the associated degeneration to the soft tissues within the knee joint, including cartilage, meniscus, or ligamentous structures. I described the spectrum of treatment options available including nonoperative modalities to total joint arthroplasty. Noninvasive and nonoperative treatment modalities include weight reduction, activity modification with low impact exercise, PRN use of acetaminophen or anti-inflammatory medication, natural anti-inflammatory supplements, glucosamine/chondroitin, and physical therapy (for strengthening and conditioning). More invasive treatments can include injection therapies; including cortisone, hyaluronic acid (visco-supplementation), or platelet-rich-plasma (PRP) injections. Definitive treatment could also include future total joint arthroplasty if symptoms persist and cause prolonged disability or interference with activities of daily living.\par \par The risks and benefits of each treatment option was discussed and all questions were answered. At this time recommendations are for conservative and symptomatic care as detailed above with impact loading activity restriction, low impact exercise and avoidance of strenuous activity.\par \par Other recommendations include OTC NSAIDs or acetaminophen (if tolerated/able), with application of ice to the knee 2-3x daily for 20 minutes or after periods of activity.\par \par Given inflammatory pain on today's evaluation, therapeutic injection of cortisone provided under sterile conditions to the left knee. Patient tolerated procedure without complication.  We discussed consideration of right knee injection if symptoms persist.\par \par Followup: As needed.

## 2023-05-17 NOTE — PHYSICAL EXAM
[de-identified] : Oriented to time, place, person\par Mood: Normal\par Affect: Normal\par Appearance: Healthy, well appearing, no acute distress.\par Gait: Normal\par Assistive Devices: None\par \par Right Knee Exam:\par \par Skin: Clean, dry, intact\par Inspection: No obvious malalignment, no masses, minimal swelling, no effusion\par Pulses: 2+ DP/PT pulses\par ROM: 0-135 degrees of flexion. + pain with deep knee flexion/extension.\par Tenderness: No MJLT. No LJLT. No pain over the patella facets. No pain to the quadriceps tendon. No pain to the patella tendon. No posterior knee tenderness.\par Stability: Stable to varus, valgus. Negative lachman testing. Negative anterior drawer, negative posterior drawer.\par Strength: 5/5 Q/H/TA/GS/EHL, without atrophy\par Neuro: In tact to light touch throughout, DTR's normal\par Additional tests: Negative McMurrays test, Negative patellar grind test.\par \par Left Knee Exam:\par \par Skin: Clean, dry, intact\par Inspection: No obvious malalignment, no masses, minimal swelling, no effusion\par Pulses: 2+ DP/PT pulses\par ROM: 0-135 degrees of flexion. + pain with deep knee flexion/extension.\par Tenderness: No MJLT. No LJLT. No pain over the patella facets. No pain to the quadriceps tendon. No pain to the patella tendon. No posterior knee tenderness.\par Stability: Stable to varus, valgus. Negative lachman testing. Negative anterior drawer, negative posterior drawer.\par Strength: 5/5 Q/H/TA/GS/EHL, without atrophy\par Neuro: In tact to light touch throughout, DTR's normal\par Additional tests: Negative McMurrays test, Negative patellar grind test.\par  [de-identified] : The following radiographs were ordered and read by me during this patients visit. I reviewed each radiograph in detail with the patient and discussed the findings as highlighted below.\par \par 4 views of the left & right knee were obtained today 05/16/2023, that show no acute fracture or dislocation. There is minimal medial, no lateral and mild patellofemoral degenerative changes seen. There is no significant malalignment. No significant other obvious osseous abnormality, otherwise unremarkable.\par

## 2023-05-17 NOTE — PROCEDURE
[de-identified] : Injection: Left knee joint.\par Indication: OA & Chondrosis\par \par A discussion was had with the patient regarding this procedure and all questions were answered. All risks, benefits and alternatives were discussed. These included but were not limited to bleeding, infection, and allergic reaction. Alcohol was used to clean the skin, and betadine was used to sterilize and prep the area in the supero-lateral aspect of the left knee. Ethyl chloride spray was then used as a topical anesthetic. A 21-gauge needle was used to inject 4cc of 1% lidocaine and 1cc of 40mg/ml methylprednisolone into the knee. A sterile bandage was then applied. The patient tolerated the procedure well and there were no complications.

## 2023-05-23 ENCOUNTER — APPOINTMENT (OUTPATIENT)
Dept: DERMATOLOGY | Facility: CLINIC | Age: 73
End: 2023-05-23

## 2023-06-28 RX ORDER — ZOSTER VACCINE RECOMBINANT, ADJUVANTED 50 MCG/0.5
50 KIT INTRAMUSCULAR
Qty: 1 | Refills: 0 | Status: ACTIVE | COMMUNITY
Start: 2023-06-28 | End: 1900-01-01

## 2023-07-19 ENCOUNTER — APPOINTMENT (OUTPATIENT)
Dept: DERMATOLOGY | Facility: CLINIC | Age: 73
End: 2023-07-19

## 2023-09-05 ENCOUNTER — APPOINTMENT (OUTPATIENT)
Dept: DERMATOLOGY | Facility: CLINIC | Age: 73
End: 2023-09-05
Payer: MEDICARE

## 2023-09-05 DIAGNOSIS — L85.3 XEROSIS CUTIS: ICD-10-CM

## 2023-09-05 DIAGNOSIS — L82.1 OTHER SEBORRHEIC KERATOSIS: ICD-10-CM

## 2023-09-05 DIAGNOSIS — L30.9 DERMATITIS, UNSPECIFIED: ICD-10-CM

## 2023-09-05 DIAGNOSIS — D22.9 MELANOCYTIC NEVI, UNSPECIFIED: ICD-10-CM

## 2023-09-05 PROCEDURE — 99204 OFFICE O/P NEW MOD 45 MIN: CPT

## 2023-09-05 RX ORDER — CLOBETASOL PROPIONATE 0.5 MG/ML
0.05 SOLUTION TOPICAL
Qty: 1 | Refills: 3 | Status: ACTIVE | COMMUNITY
Start: 2023-09-05 | End: 1900-01-01

## 2023-10-12 ENCOUNTER — OUTPATIENT (OUTPATIENT)
Dept: OUTPATIENT SERVICES | Facility: HOSPITAL | Age: 73
LOS: 1 days | End: 2023-10-12
Payer: MEDICARE

## 2023-10-12 ENCOUNTER — APPOINTMENT (OUTPATIENT)
Age: 73
End: 2023-10-12
Payer: MEDICARE

## 2023-10-12 VITALS
WEIGHT: 138 LBS | HEART RATE: 79 BPM | HEIGHT: 62 IN | BODY MASS INDEX: 25.4 KG/M2 | TEMPERATURE: 98.7 F | OXYGEN SATURATION: 98 % | SYSTOLIC BLOOD PRESSURE: 140 MMHG | DIASTOLIC BLOOD PRESSURE: 80 MMHG

## 2023-10-12 DIAGNOSIS — M25.50 PAIN IN UNSPECIFIED JOINT: ICD-10-CM

## 2023-10-12 DIAGNOSIS — S39.012A STRAIN OF MUSCLE, FASCIA AND TENDON OF LOWER BACK, INITIAL ENCOUNTER: ICD-10-CM

## 2023-10-12 DIAGNOSIS — K43.9 VENTRAL HERNIA WITHOUT OBSTRUCTION OR GANGRENE: ICD-10-CM

## 2023-10-12 DIAGNOSIS — K43.9 VENTRAL HERNIA W/OUT OBSTRUCTION OR GANGRENE: ICD-10-CM

## 2023-10-12 DIAGNOSIS — H02.209 UNSPECIFIED LAGOPHTHALMOS UNSPECIFIED EYE, UNSPECIFIED EYELID: Chronic | ICD-10-CM

## 2023-10-12 DIAGNOSIS — H26.9 UNSPECIFIED CATARACT: Chronic | ICD-10-CM

## 2023-10-12 DIAGNOSIS — I10 ESSENTIAL (PRIMARY) HYPERTENSION: ICD-10-CM

## 2023-10-12 PROCEDURE — 99214 OFFICE O/P EST MOD 30 MIN: CPT

## 2023-10-12 PROCEDURE — G0463: CPT

## 2023-10-23 ENCOUNTER — APPOINTMENT (OUTPATIENT)
Dept: OPHTHALMOLOGY | Facility: CLINIC | Age: 73
End: 2023-10-23

## 2023-10-25 PROBLEM — K43.9 VENTRAL HERNIA: Status: ACTIVE | Noted: 2023-10-12

## 2023-10-25 PROBLEM — S39.012A LUMBOSACRAL STRAIN: Status: ACTIVE | Noted: 2023-10-25

## 2023-10-25 PROBLEM — M25.50 JOINT PAIN: Status: ACTIVE | Noted: 2020-08-31

## 2023-11-17 ENCOUNTER — APPOINTMENT (OUTPATIENT)
Dept: MAMMOGRAPHY | Facility: IMAGING CENTER | Age: 73
End: 2023-11-17
Payer: MEDICARE

## 2023-11-17 ENCOUNTER — RESULT REVIEW (OUTPATIENT)
Age: 73
End: 2023-11-17

## 2023-11-17 ENCOUNTER — OUTPATIENT (OUTPATIENT)
Dept: OUTPATIENT SERVICES | Facility: HOSPITAL | Age: 73
LOS: 1 days | End: 2023-11-17
Payer: MEDICARE

## 2023-11-17 DIAGNOSIS — H02.209 UNSPECIFIED LAGOPHTHALMOS UNSPECIFIED EYE, UNSPECIFIED EYELID: Chronic | ICD-10-CM

## 2023-11-17 DIAGNOSIS — H26.9 UNSPECIFIED CATARACT: Chronic | ICD-10-CM

## 2023-11-17 DIAGNOSIS — Z00.8 ENCOUNTER FOR OTHER GENERAL EXAMINATION: ICD-10-CM

## 2023-11-17 PROCEDURE — 77063 BREAST TOMOSYNTHESIS BI: CPT

## 2023-11-17 PROCEDURE — 77063 BREAST TOMOSYNTHESIS BI: CPT | Mod: 26

## 2023-11-17 PROCEDURE — 77067 SCR MAMMO BI INCL CAD: CPT

## 2023-11-17 PROCEDURE — 77067 SCR MAMMO BI INCL CAD: CPT | Mod: 26

## 2023-11-22 ENCOUNTER — NON-APPOINTMENT (OUTPATIENT)
Age: 73
End: 2023-11-22

## 2023-12-04 ENCOUNTER — APPOINTMENT (OUTPATIENT)
Dept: OPHTHALMOLOGY | Facility: CLINIC | Age: 73
End: 2023-12-04
Payer: MEDICARE

## 2023-12-04 ENCOUNTER — NON-APPOINTMENT (OUTPATIENT)
Age: 73
End: 2023-12-04

## 2023-12-04 PROCEDURE — 92083 EXTENDED VISUAL FIELD XM: CPT

## 2023-12-04 PROCEDURE — 92012 INTRM OPH EXAM EST PATIENT: CPT

## 2023-12-17 NOTE — PLAN
[FreeTextEntry1] : 68 yo F w/ PMHx of hypothyroidism, anxiety and pre-DM (A1c 5.7% in February 2018) presenting for acute visit for cough and chills since end of June. \par \par #cough possibly 2/2 asthma \par - was prescribed proventil in December but never used, will prescribe 2 puffs up to 4 times a day \par - showed patient how to use correctly in clinic today \par - f/u chest xray\par \par #constipation\par - has been chronic issue \par - pt received GI referral last visit, advised patient to make appointment, likely needs colonoscopy \par \par RTC PRN\par D/w Dr. Mondragon\par Shalini Garcia PGY2
no

## 2024-01-12 ENCOUNTER — OUTPATIENT (OUTPATIENT)
Dept: OUTPATIENT SERVICES | Facility: HOSPITAL | Age: 74
LOS: 1 days | End: 2024-01-12
Payer: MEDICARE

## 2024-01-12 ENCOUNTER — APPOINTMENT (OUTPATIENT)
Dept: INTERNAL MEDICINE | Facility: CLINIC | Age: 74
End: 2024-01-12
Payer: MEDICARE

## 2024-01-12 ENCOUNTER — APPOINTMENT (OUTPATIENT)
Dept: RADIOLOGY | Facility: CLINIC | Age: 74
End: 2024-01-12
Payer: MEDICARE

## 2024-01-12 VITALS
WEIGHT: 134 LBS | HEIGHT: 62 IN | BODY MASS INDEX: 24.66 KG/M2 | DIASTOLIC BLOOD PRESSURE: 88 MMHG | OXYGEN SATURATION: 98 % | SYSTOLIC BLOOD PRESSURE: 140 MMHG | HEART RATE: 70 BPM

## 2024-01-12 DIAGNOSIS — H26.9 UNSPECIFIED CATARACT: Chronic | ICD-10-CM

## 2024-01-12 DIAGNOSIS — H02.209 UNSPECIFIED LAGOPHTHALMOS UNSPECIFIED EYE, UNSPECIFIED EYELID: Chronic | ICD-10-CM

## 2024-01-12 DIAGNOSIS — R05.9 COUGH, UNSPECIFIED: ICD-10-CM

## 2024-01-12 DIAGNOSIS — I10 ESSENTIAL (PRIMARY) HYPERTENSION: ICD-10-CM

## 2024-01-12 PROCEDURE — G0463: CPT

## 2024-01-12 PROCEDURE — 99214 OFFICE O/P EST MOD 30 MIN: CPT

## 2024-01-12 PROCEDURE — 71046 X-RAY EXAM CHEST 2 VIEWS: CPT | Mod: 26

## 2024-01-12 PROCEDURE — 71046 X-RAY EXAM CHEST 2 VIEWS: CPT

## 2024-01-12 RX ORDER — BENZONATATE 200 MG/1
200 CAPSULE ORAL 3 TIMES DAILY
Qty: 21 | Refills: 1 | Status: ACTIVE | COMMUNITY
Start: 2024-01-12 | End: 1900-01-01

## 2024-01-22 NOTE — ASSESSMENT
[FreeTextEntry1] : 1) unclear if this is more her chronic cough (eg, reflux vs drip, needing more work up) or post infectious healing of bronchitis/inflammation of bronchial mucosa after viral syndrome.  For now would like something to ease cough - benzonatate sent to pharmacy; getting CXR given some persistent purulence, be sure no infiltrate or upper lobe dz.  Takes famotidine, has had trials of fluticasone spray.  May also have a mixed rheumatologic syndrome as well w sicca/+EZRA - ?parenchymal dz of this.  If cough persists, PFTs/CT parenchyma next considerations.

## 2024-01-22 NOTE — HISTORY OF PRESENT ILLNESS
[FreeTextEntry8] : Came to get checked out for a cough that has not completely resolved since viral URI she had 6-8 weeks ago - see chart note.  With this cough having some of it with a sputum that can still look yellow, sometimes w/o much sputum.  Has no sob, torres, audible wheeze associated.  Of interest has chronic cough, reflux and latent TB as relevant problems on her active list, which could pertain to chronic cough.

## 2024-01-22 NOTE — REVIEW OF SYSTEMS
[Fever] : no fever [Chills] : no chills [Night Sweats] : no night sweats [Postnasal Drip] : no postnasal drip [Negative] : Genitourinary [FreeTextEntry6] : see hpi

## 2024-02-09 NOTE — REVIEW OF SYSTEMS
Progress Note      Reason for Referral:  Hortencia Garcia (MR#: [id number]) was referred to this psychologist by PCP to address issues related to mood.     Service Provided:  I met with Hortencia today for a 51 minute individual psychotherapy appointment.     This visit was performed via live interactive two-way video conferencing platform utilizing [employer] App HIPAA compliant Zoom integrated software.  Patient verbalized understanding of the use of this modality and provided informed consent.  Clinician Location: [place] [facility] [place]  Patient Location: [place] [facility] [place] address listed in EHR. Patient confirms that she is in a safe and confidential place. Patient confirmed that no one else is in the room except her and that no one is recording the session.     Relevant History and Session Note:  Hortencia presented today reporting that mood is tired. Patient reported poor sleep quality and an increase in pain levels. Patient processed work related stressors. Patient discussed her role in family of origin. Patient processed a specific situation related to her father's health issues and how she is navigating being supportive and setting boundaries.     Interventions:   Psychologist took person-centered approach, engaged in empathetic listening and promoting positive self-regard as it relates to patient's presenting problem of mood.  Explored her current stressors/presenting problems.  Psychological flexibility  Motivational interviewing  Self care behaviors -structure and routine on a daily basis; leisure, connecting with nature,vacations     Additional Therapeutic Support Provided:  Engaged/allied with patient  Provided support and encouragement  Provided empathy and active listening  Provided feedback and recommendations     MENTAL STATUS EXAM:  Progress relevant to last session: same  Hortencia presented today as:   Behavior:Cooperative, Polite and Within Normal Limits  Appearance and Hygiene: within normal limits  Eye  Contact:appropriate  Speech: logical, coherent and appropriate rate rhythm and volume  Attention:  adequate  Gait, movement and Motor Coordination: appropriate  Alert and Oriented: Yes, to Person, Place, and Time  Mood/Affect:  mild anxiety  Thought Process: logical and coherent   Cognitive Performance:  normal limits  Insight and Judgment: within normal limits for age   Self-Harm: denied  Current Suicidal Ideation: Suicidal ideation, plan, or intent reported? denied  Homicidal Ideation: Homicidal ideation, plan, or intent reported?  denied     Diagnosis:  Major depressive disorder, recurrent, mild  Generalized anxiety disorder     Recommendations/Plan:  I will continue to follow Hortencia regularly to provide training in development of better coping strategies in relation to her issues of mood.    Follow up in 2-3 week(s).  Hortencia will go to the nearest emergency room or call 9-1-1 if she ever reports feelings of suicidality or if they believe she may be a threat to self or others.  Hortencia will inform Dr. Mitchell or other support if she feels unsafe.          Current Outpatient Medications   Medication Sig Dispense Refill   • Multiple Vitamins-Minerals (MULTIVITAMIN ADULT PO) Take by mouth daily.     • azelastine (ASTELIN) 0.1 % nasal spray Spray 2 sprays in each nostril.     • cetirizine (ZYRTEC) 10 MG tablet Take 10 mg by mouth daily as needed for Allergies.     • diclofenac (VOLTAREN) 1 % gel APPLY TO THE AFFECTED AREA(S) FOUR TIMES DAILY.  0   • famotidine (PEPCID) 20 MG tablet Take 20 mg by mouth.     • fluticasone (FLONASE) 50 MCG/ACT nasal spray Spray 2 sprays in each nostril daily.     • lidocaine (LIDODERM) 5 % patch Place 1 patch onto the skin daily as needed for Pain.     • montelukast (SINGULAIR) 10 MG tablet TAKE 1 TABLET BY ORAL ROUTE EVERY EVENING.  1   • PAZEO 0.7 % ophthalmic solution daily as needed.     • albuterol 108 (90 Base) MCG/ACT inhaler        No current facility-administered medications for  this visit.       Thank you for the opportunity to participate in the care of Hortencia. Please feel free to contact me at [phone] should you have any questions about my recommendations.      Maryuri Mitchell PSYD           [Urticaria] : urticaria [Pruritis] : pruritis [Nl] : Psychiatric [Pain On Swallowing] : no pain on swallowing [Vomiting] : no vomiting [Atopic Dermatitis] : no atopic dermatitis [Dry Skin] : no ~L dry skin [Swelling] : no swelling [FreeTextEntry4] : see HPI

## 2024-02-26 ENCOUNTER — NON-APPOINTMENT (OUTPATIENT)
Age: 74
End: 2024-02-26

## 2024-02-26 ENCOUNTER — APPOINTMENT (OUTPATIENT)
Dept: INTERNAL MEDICINE | Facility: CLINIC | Age: 74
End: 2024-02-26
Payer: MEDICARE

## 2024-02-26 ENCOUNTER — OUTPATIENT (OUTPATIENT)
Dept: OUTPATIENT SERVICES | Facility: HOSPITAL | Age: 74
LOS: 1 days | End: 2024-02-26
Payer: MEDICARE

## 2024-02-26 VITALS
HEIGHT: 62 IN | DIASTOLIC BLOOD PRESSURE: 80 MMHG | WEIGHT: 133 LBS | SYSTOLIC BLOOD PRESSURE: 120 MMHG | OXYGEN SATURATION: 98 % | BODY MASS INDEX: 24.48 KG/M2 | HEART RATE: 84 BPM

## 2024-02-26 DIAGNOSIS — Z12.83 ENCOUNTER FOR SCREENING FOR MALIGNANT NEOPLASM OF SKIN: ICD-10-CM

## 2024-02-26 DIAGNOSIS — Z00.00 ENCOUNTER FOR GENERAL ADULT MEDICAL EXAMINATION W/OUT ABNORMAL FINDINGS: ICD-10-CM

## 2024-02-26 DIAGNOSIS — H02.209 UNSPECIFIED LAGOPHTHALMOS UNSPECIFIED EYE, UNSPECIFIED EYELID: Chronic | ICD-10-CM

## 2024-02-26 DIAGNOSIS — I10 ESSENTIAL (PRIMARY) HYPERTENSION: ICD-10-CM

## 2024-02-26 DIAGNOSIS — R19.8 OTHER SPECIFIED SYMPTOMS AND SIGNS INVOLVING THE DIGESTIVE SYSTEM AND ABDOMEN: ICD-10-CM

## 2024-02-26 DIAGNOSIS — H26.9 UNSPECIFIED CATARACT: Chronic | ICD-10-CM

## 2024-02-26 DIAGNOSIS — R07.89 OTHER CHEST PAIN: ICD-10-CM

## 2024-02-26 PROCEDURE — G0439: CPT

## 2024-02-26 PROCEDURE — G0009: CPT

## 2024-02-26 PROCEDURE — G0444 DEPRESSION SCREEN ANNUAL: CPT

## 2024-02-26 PROCEDURE — 90677 PCV20 VACCINE IM: CPT

## 2024-02-26 PROCEDURE — 93010 ELECTROCARDIOGRAM REPORT: CPT

## 2024-02-26 NOTE — PHYSICAL EXAM
[No Acute Distress] : no acute distress [Well Developed] : well developed [Well Nourished] : well nourished [Normal Sclera/Conjunctiva] : normal sclera/conjunctiva [Well-Appearing] : well-appearing [EOMI] : extraocular movements intact [PERRL] : pupils equal round and reactive to light [Normal Oropharynx] : the oropharynx was normal [Normal Outer Ear/Nose] : the outer ears and nose were normal in appearance [No JVD] : no jugular venous distention [No Lymphadenopathy] : no lymphadenopathy [Supple] : supple [Thyroid Normal, No Nodules] : the thyroid was normal and there were no nodules present [No Respiratory Distress] : no respiratory distress  [No Accessory Muscle Use] : no accessory muscle use [Normal Rate] : normal rate  [Clear to Auscultation] : lungs were clear to auscultation bilaterally [Regular Rhythm] : with a regular rhythm [No Murmur] : no murmur heard [Normal S1, S2] : normal S1 and S2 [No Carotid Bruits] : no carotid bruits [No Abdominal Bruit] : a ~M bruit was not heard ~T in the abdomen [No Varicosities] : no varicosities [Pedal Pulses Present] : the pedal pulses are present [No Edema] : there was no peripheral edema [No Palpable Aorta] : no palpable aorta [No Extremity Clubbing/Cyanosis] : no extremity clubbing/cyanosis [Normal Appearance] : normal in appearance [No Nipple Discharge] : no nipple discharge [No Axillary Lymphadenopathy] : no axillary lymphadenopathy [Non Tender] : non-tender [Soft] : abdomen soft [No Masses] : no abdominal mass palpated [Non-distended] : non-distended [No HSM] : no HSM [Normal Bowel Sounds] : normal bowel sounds [Normal Posterior Cervical Nodes] : no posterior cervical lymphadenopathy [No CVA Tenderness] : no CVA  tenderness [Normal Anterior Cervical Nodes] : no anterior cervical lymphadenopathy [No Spinal Tenderness] : no spinal tenderness [No Joint Swelling] : no joint swelling [No Rash] : no rash [Grossly Normal Strength/Tone] : grossly normal strength/tone [Coordination Grossly Intact] : coordination grossly intact [No Focal Deficits] : no focal deficits [Normal Gait] : normal gait [Normal Affect] : the affect was normal [Deep Tendon Reflexes (DTR)] : deep tendon reflexes were 2+ and symmetric [Normal Insight/Judgement] : insight and judgment were intact

## 2024-02-29 NOTE — REVIEW OF SYSTEMS
[Constipation] : constipation [Itching] : Itching [Negative] : Heme/Lymph [Skin Rash] : no skin rash [de-identified] : dry skin

## 2024-02-29 NOTE — HISTORY OF PRESENT ILLNESS
[de-identified] : 73 [facility] female single,  (daughter 58, son 53, adopted 45 son), here for AWV. 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 Her 68 yo sister Maureen Crouch passed away suddenly from met malignant melanoma and hospitalized at [facility] ER for fever and hypotension and passed 2024-seen 2024 for cough x 2 months, given Tessalon, CXR 2024-no infiltrate Feels numbness in both hands L>R Had COVID 2022  with MAB infusion, Had Pfizer x 4 by  end  Osteoporosis-get annual injection with Rheum -Chronic constipation- colonoscopy/EGD 2020-small sessile polyp -preDM-f/u labs needed today -hypothyroidism-needs labs on synthroid -eczema-using moisturizer but hard to use on back -HTN-on low dose ACE since 2016 -Gyn f/u 2022 mammogram 2023 DEXA

## 2024-03-04 DIAGNOSIS — Z00.00 ENCOUNTER FOR GENERAL ADULT MEDICAL EXAMINATION WITHOUT ABNORMAL FINDINGS: ICD-10-CM

## 2024-03-04 DIAGNOSIS — R19.8 OTHER SPECIFIED SYMPTOMS AND SIGNS INVOLVING THE DIGESTIVE SYSTEM AND ABDOMEN: ICD-10-CM

## 2024-03-04 DIAGNOSIS — Z13.31 ENCOUNTER FOR SCREENING FOR DEPRESSION: ICD-10-CM

## 2024-03-04 DIAGNOSIS — R07.89 OTHER CHEST PAIN: ICD-10-CM

## 2024-03-04 DIAGNOSIS — Z12.83 ENCOUNTER FOR SCREENING FOR MALIGNANT NEOPLASM OF SKIN: ICD-10-CM

## 2024-03-04 DIAGNOSIS — Z23 ENCOUNTER FOR IMMUNIZATION: ICD-10-CM

## 2024-03-18 ENCOUNTER — RX RENEWAL (OUTPATIENT)
Age: 74
End: 2024-03-18

## 2024-03-22 ENCOUNTER — APPOINTMENT (OUTPATIENT)
Dept: PAIN MANAGEMENT | Facility: CLINIC | Age: 74
End: 2024-03-22
Payer: MEDICARE

## 2024-03-22 VITALS
HEART RATE: 78 BPM | SYSTOLIC BLOOD PRESSURE: 136 MMHG | HEIGHT: 62 IN | WEIGHT: 133 LBS | DIASTOLIC BLOOD PRESSURE: 78 MMHG | BODY MASS INDEX: 24.48 KG/M2

## 2024-03-22 DIAGNOSIS — M54.2 CERVICALGIA: ICD-10-CM

## 2024-03-22 PROCEDURE — 99204 OFFICE O/P NEW MOD 45 MIN: CPT

## 2024-03-22 NOTE — HISTORY OF PRESENT ILLNESS
[FreeTextEntry1] : This is a case of a 74   -year-old right-handed male presents with a chief complaint of HEADACHES   Patient reports having had many concussions in the past.  Patient reports having years of neck pain extending to the eye/forehead on left side. Applied ice and Tylenol with some benefit. Her headaches are intermittent and are triggered by stress, weather - heat, anxiety. At times, movement may increase headaches and neck pain.    Patient reports -nausea, -vomiting, +photophobia, +-phonophobia, -aura.  Never . Three children.  Never had imaging.  Sleep - at times awaken with headaches.   Caffeine 1 per day intake/day -  Comorbidities: Anxiety Imaging: None Medications: L-THYROXINE Interventions: None CAM therapies: None SHX:  Clean houses. Occ. ETOH, No tobacco cannabis PMH: Hypothyroidism Family history: 2 sisters had migraine.  Allergies: IV contrastt

## 2024-03-22 NOTE — PHYSICAL EXAM
[FreeTextEntry1] : Constitutional: No signs of distress or signs of toxicity. Mental Status: Alert and well oriented. Speech fluent. No aphasia. Fund of knowledge intact. Psychiatric: Mood stable Cranial Nerve: PERRLA: No papilledema; No VFC; EOM full. no nystagmus. No Erin. V1-3 intact. No facial asymmetry, hearing grossly intact; palate elevates symmetrically, tongue midline Motor: No involuntary movements noted.  Adequate bulk, tone throughout, 5/5 strength of all muscle groups DTR: present and symmetrical; no clonus, plantars  downgoing Sensory: intact to primary and secondary modalities; neg Romberg Cerebellar: adequate finger to nose and heel to shin bilaterally. Gait: non antalgic or ataxic. Eyes: no redness or swelling HEENT: intact; no signs of trauma. Neck: No masses noted Pulmonary: no respiratory distress Vascular: no temperature, color change or sudomotor changes.; no edema Musculoskeletal: examination of the cervical spine reveals no midline tenderness, tenderness notes of the cervical paraspinal region. range of motion full upon flexion, extension and lateral rotation. Negative facet tenderness, Negative Spurlings bilaterally. Skin: No rash.

## 2024-03-22 NOTE — ASSESSMENT
[FreeTextEntry1] : Chronic headaches ? migraine-like Insomnia Cervicogenic / occipital neuralgia.  Recommend an MRI brain and cervical region to document pathology.  Consider Magnesium glycinate 400 mgs qd. Advised of potential AEs.  Consider local nerve blocks and trigger point injections.

## 2024-04-07 ENCOUNTER — APPOINTMENT (OUTPATIENT)
Dept: MRI IMAGING | Facility: IMAGING CENTER | Age: 74
End: 2024-04-07
Payer: MEDICARE

## 2024-04-07 ENCOUNTER — OUTPATIENT (OUTPATIENT)
Dept: OUTPATIENT SERVICES | Facility: HOSPITAL | Age: 74
LOS: 1 days | End: 2024-04-07
Payer: MEDICARE

## 2024-04-07 DIAGNOSIS — M54.2 CERVICALGIA: ICD-10-CM

## 2024-04-07 DIAGNOSIS — H26.9 UNSPECIFIED CATARACT: Chronic | ICD-10-CM

## 2024-04-07 DIAGNOSIS — Z00.8 ENCOUNTER FOR OTHER GENERAL EXAMINATION: ICD-10-CM

## 2024-04-07 DIAGNOSIS — H02.209 UNSPECIFIED LAGOPHTHALMOS UNSPECIFIED EYE, UNSPECIFIED EYELID: Chronic | ICD-10-CM

## 2024-04-07 PROCEDURE — 72141 MRI NECK SPINE W/O DYE: CPT | Mod: 26

## 2024-04-07 PROCEDURE — 70551 MRI BRAIN STEM W/O DYE: CPT | Mod: 26

## 2024-04-07 PROCEDURE — 72141 MRI NECK SPINE W/O DYE: CPT

## 2024-04-07 PROCEDURE — 70551 MRI BRAIN STEM W/O DYE: CPT

## 2024-04-09 NOTE — HISTORY OF PRESENT ILLNESS
Assessment:     Well adolescent.     1. Health check for child over 28 days old    2. Auditory acuity evaluation    3. Examination of eyes and vision    4. Screening for depression    5. Body mass index, pediatric, 5th percentile to less than 85th percentile for age    6. Exercise counseling    7. Nutritional counseling    8. Screening, lipid  -     Lipid panel; Future    9. Screen for sexually transmitted diseases  -     Chlamydia/GC amplified DNA by PCR    10. Screening for HIV (human immunodeficiency virus)  -     HIV 1/2 AB/AG w Reflex [facility] for 2 yr old and above; Future    11. Encounter for immunization  -     MENINGOCOCCAL ACYW-135 TT CONJUGATE    12. Spinal curvature  -     XR entire spine (scoliosis) 2-3 vw; Future; Expected date: 04/09/2024  -     Ambulatory Referral to Orthopedic Surgery; Future    13. Chronic midline thoracic back pain  -     XR entire spine (scoliosis) 2-3 vw; Future; Expected date: 04/09/2024  -     Ambulatory Referral to Orthopedic Surgery; Future         Plan:         1. Anticipatory guidance discussed.  Gave handout on well-child issues at this age.  Specific topics reviewed: drugs, ETOH, and tobacco, importance of regular dental care, importance of regular exercise, importance of varied diet, limit TV, media violence, and seat belts.    Nutrition and Exercise Counseling:     The patient's Body mass index is 20.52 kg/m². This is 49 %ile (Z= -0.04) based on CDC (Boys, 2-20 Years) BMI-for-age based on BMI available as of 4/9/2024.    Nutrition counseling provided:  Avoid juice/sugary drinks. 5 servings of fruits/vegetables.    Exercise counseling provided:  Reduce screen time to less than 2 hours per day.    Depression Screening and Follow-up Plan:     Depression screening was negative with PHQ-A score of 6. Patient does not have thoughts of ending their life in the past month. Patient has not attempted suicide in their lifetime.        2. Development: appropriate for age    3.  Immunizations today: per orders.  Discussed with: mother  The benefits, contraindication and side effects for the following vaccines were reviewed: Meningococcal  Total number of components reveiwed: 1    4. Follow-up visit in 1 year for next well child visit, or sooner as needed.     5. Will order XR spine for back pain and rib hump noted on exam. Will refer to ortho for back pain and leg pain. Will follow up XR results.     Subjective:     Aj Morris is a 16 y.o. male who is here for this well-child visit.    Current Issues:  Current concerns include backpain. Can't lay down. Has been going on for awhile. Lower/middle back pain. Only bothers him when he is laying in a kraig at dentist or orthodontist. Not stopping activity. Right leg, walks for about 20-30 min and starts bothering him at his knee. No sports, denies trauma.No medication.     Well Child Assessment:  History was provided by the mother. Aj lives with his mother, brother, stepparent, grandfather and sister.   Nutrition  Types of intake include vegetables, meats, fruits, eggs, cereals and cow's milk.   Dental  The patient has a dental home. The patient brushes teeth regularly. The patient flosses regularly. Last dental exam was less than 6 months ago.   Elimination  Elimination problems do not include constipation, diarrhea or urinary symptoms.   Behavioral  Behavioral issues do not include misbehaving with peers or misbehaving with siblings. Disciplinary methods include consistency among caregivers.   Sleep  Average sleep duration is 8 hours. The patient does not snore. There are no sleep problems.   Safety  There is no smoking in the home. Home has working smoke alarms? yes. Home has working carbon monoxide alarms? yes. There is no gun in home.   School  Current grade level is 10th. Current school district is [employer]. There are signs of learning disabilities (504). Child is doing well in school.   Social  The caregiver enjoys the child.  "After school, the child is at home with a parent. Sibling interactions are good.       The following portions of the patient's history were reviewed and updated as appropriate: allergies, current medications, past family history, past medical history, past social history, past surgical history, and problem list.          Objective:       Vitals:    04/09/24 1307   BP: (!) 107/51   BP Location: Left arm   Patient Position: Sitting   Cuff Size: Standard   Pulse: 69   SpO2: 97%   Weight: 74.2 kg (163 lb 8 oz)   Height: 6' 2.84\" (1.901 m)     Growth parameters are noted and are appropriate for age.    Wt Readings from Last 1 Encounters:   04/09/24 74.2 kg (163 lb 8 oz) (84%, Z= 1.01)*     * Growth percentiles are based on CDC (Boys, 2-20 Years) data.     Ht Readings from Last 1 Encounters:   04/09/24 6' 2.84\" (1.901 m) (99%, Z= 2.30)*     * Growth percentiles are based on CDC (Boys, 2-20 Years) data.      Body mass index is 20.52 kg/m².    Vitals:    04/09/24 1307   BP: (!) 107/51   BP Location: Left arm   Patient Position: Sitting   Cuff Size: Standard   Pulse: 69   SpO2: 97%   Weight: 74.2 kg (163 lb 8 oz)   Height: 6' 2.84\" (1.901 m)       Hearing Screening    500Hz 1000Hz 2000Hz 4000Hz   Right ear 25 25 25 25   Left ear 25 25 25 25   Vision Screening - Comments:: Mom declined,Goes to optometrist annually    Physical Exam  Vitals and nursing note reviewed.   Constitutional:       Appearance: Normal appearance.   HENT:      Head: Normocephalic.      Right Ear: Tympanic membrane, ear canal and external ear normal.      Left Ear: Tympanic membrane, ear canal and external ear normal.      Nose: Nose normal.      Mouth/Throat:      Mouth: Mucous membranes are moist.      Pharynx: Oropharynx is clear.   Eyes:      Extraocular Movements: Extraocular movements intact.      Conjunctiva/sclera: Conjunctivae normal.      Pupils: Pupils are equal, round, and reactive to light.   Cardiovascular:      Rate and Rhythm: Normal rate " [FreeTextEntry1] : 2020- adenoma, diverticulosis [de-identified] : 2016- gallstones and regular rhythm.      Pulses: Normal pulses.      Heart sounds: No murmur heard.  Pulmonary:      Effort: Pulmonary effort is normal.      Breath sounds: Normal breath sounds.   Abdominal:      General: Abdomen is flat.      Palpations: Abdomen is soft.   Genitourinary:     Penis: Normal.       Testes: Normal.      Comments: Devang IV  Musculoskeletal:         General: Normal range of motion.      Cervical back: Normal range of motion and neck supple.      Comments: +rip hump with spinal curvature noted to lower thoracic/upper lumbar area. +tenderness of paraspinal muscles on the right.   Lymphadenopathy:      Cervical: No cervical adenopathy.   Skin:     General: Skin is warm.      Capillary Refill: Capillary refill takes less than 2 seconds.   Neurological:      General: No focal deficit present.      Mental Status: He is alert.         Review of Systems   Respiratory:  Negative for snoring.    Gastrointestinal:  Negative for constipation and diarrhea.   Psychiatric/Behavioral:  Negative for sleep disturbance.                 [Heartburn] : stable heartburn [Nausea] : denies nausea [Vomiting] : denies vomiting [Diarrhea] : denies diarrhea [Constipation] : stable constipation [Yellow Skin Or Eyes (Jaundice)] : denies jaundice [Abdominal Swelling] : denies abdominal swelling [Abdominal Pain] : stable abdominal pain [Rectal Pain] : denies rectal pain [_________] : Performed [unfilled] [de-identified] : \par  [de-identified] : gallstones [de-identified] : gastritis [de-identified] : tubular adenoma, diverticulosis

## 2024-04-15 ENCOUNTER — NON-APPOINTMENT (OUTPATIENT)
Age: 74
End: 2024-04-15

## 2024-04-16 ENCOUNTER — OUTPATIENT (OUTPATIENT)
Dept: OUTPATIENT SERVICES | Facility: HOSPITAL | Age: 74
LOS: 1 days | End: 2024-04-16
Payer: MEDICARE

## 2024-04-16 ENCOUNTER — APPOINTMENT (OUTPATIENT)
Dept: INTERNAL MEDICINE | Facility: CLINIC | Age: 74
End: 2024-04-16
Payer: MEDICARE

## 2024-04-16 VITALS
DIASTOLIC BLOOD PRESSURE: 60 MMHG | OXYGEN SATURATION: 98 % | SYSTOLIC BLOOD PRESSURE: 120 MMHG | BODY MASS INDEX: 23.92 KG/M2 | HEIGHT: 62 IN | WEIGHT: 130 LBS | HEART RATE: 77 BPM

## 2024-04-16 DIAGNOSIS — H02.209 UNSPECIFIED LAGOPHTHALMOS UNSPECIFIED EYE, UNSPECIFIED EYELID: Chronic | ICD-10-CM

## 2024-04-16 DIAGNOSIS — J06.9 ACUTE UPPER RESPIRATORY INFECTION, UNSPECIFIED: ICD-10-CM

## 2024-04-16 DIAGNOSIS — I10 ESSENTIAL (PRIMARY) HYPERTENSION: ICD-10-CM

## 2024-04-16 DIAGNOSIS — E78.5 HYPERLIPIDEMIA, UNSPECIFIED: ICD-10-CM

## 2024-04-16 DIAGNOSIS — H26.9 UNSPECIFIED CATARACT: Chronic | ICD-10-CM

## 2024-04-16 LAB
24R-OH-CALCIDIOL SERPL-MCNC: 58.1 PG/ML
25(OH)D3 SERPL-MCNC: 31.9 NG/ML
ALBUMIN SERPL ELPH-MCNC: 4.6 G/DL
ALP BLD-CCNC: 92 U/L
ALT SERPL-CCNC: 13 U/L
ANION GAP SERPL CALC-SCNC: 11 MMOL/L
APPEARANCE: CLEAR
AST SERPL-CCNC: 24 U/L
BACTERIA: NEGATIVE /HPF
BILIRUB SERPL-MCNC: 0.3 MG/DL
BILIRUBIN URINE: NEGATIVE
BLOOD URINE: ABNORMAL
BUN SERPL-MCNC: 13 MG/DL
CALCIUM SERPL-MCNC: 10.2 MG/DL
CAST: 0 /LPF
CHLORIDE SERPL-SCNC: 102 MMOL/L
CHOLEST SERPL-MCNC: 196 MG/DL
CO2 SERPL-SCNC: 27 MMOL/L
COLOR: YELLOW
CREAT SERPL-MCNC: 0.71 MG/DL
EGFR: 90 ML/MIN/1.73M2
EPITHELIAL CELLS: 0 /HPF
ESTIMATED AVERAGE GLUCOSE: 117 MG/DL
GLUCOSE QUALITATIVE U: NEGATIVE MG/DL
GLUCOSE SERPL-MCNC: 99 MG/DL
HBA1C MFR BLD HPLC: 5.7 %
HCT VFR BLD CALC: 39.3 %
HDLC SERPL-MCNC: 61 MG/DL
HGB BLD-MCNC: 12.4 G/DL
KETONES URINE: NEGATIVE MG/DL
LDLC SERPL CALC-MCNC: 117 MG/DL
LEUKOCYTE ESTERASE URINE: NEGATIVE
MCHC RBC-ENTMCNC: 29.8 PG
MCHC RBC-ENTMCNC: 31.6 GM/DL
MCV RBC AUTO: 94.5 FL
MICROSCOPIC-UA: NORMAL
NITRITE URINE: NEGATIVE
NONHDLC SERPL-MCNC: 136 MG/DL
PH URINE: 7
PLATELET # BLD AUTO: 340 K/UL
POTASSIUM SERPL-SCNC: 5.3 MMOL/L
PROT SERPL-MCNC: 8.1 G/DL
PROTEIN URINE: NEGATIVE MG/DL
RBC # BLD: 4.16 M/UL
RBC # FLD: 13.4 %
RED BLOOD CELLS URINE: 1 /HPF
SODIUM SERPL-SCNC: 140 MMOL/L
SPECIFIC GRAVITY URINE: 1.01
TRIGL SERPL-MCNC: 105 MG/DL
UROBILINOGEN URINE: 0.2 MG/DL
WBC # FLD AUTO: 7.78 K/UL
WHITE BLOOD CELLS URINE: 0 /HPF

## 2024-04-16 PROCEDURE — G0463: CPT

## 2024-04-16 PROCEDURE — 99213 OFFICE O/P EST LOW 20 MIN: CPT

## 2024-04-16 RX ORDER — ATORVASTATIN CALCIUM 10 MG/1
10 TABLET, FILM COATED ORAL
Qty: 90 | Refills: 3 | Status: ACTIVE | COMMUNITY
Start: 2024-04-16 | End: 1900-01-01

## 2024-04-16 RX ORDER — AZITHROMYCIN 250 MG/1
250 TABLET, FILM COATED ORAL
Qty: 1 | Refills: 1 | Status: ACTIVE | COMMUNITY
Start: 2024-04-16 | End: 1900-01-01

## 2024-04-20 ENCOUNTER — NON-APPOINTMENT (OUTPATIENT)
Age: 74
End: 2024-04-20

## 2024-04-20 PROBLEM — J06.9 UPPER RESPIRATORY INFECTION: Status: ACTIVE | Noted: 2022-01-04

## 2024-04-20 PROBLEM — E78.5 DYSLIPIDEMIA: Status: ACTIVE | Noted: 2024-04-16

## 2024-04-20 NOTE — HISTORY OF PRESENT ILLNESS
[de-identified] : 74-year-old female with history of hypertension, hypothyroidism, prediabetes and osteoporosis, presents today with concern that her blood pressure may be high when she monitors it at home blood pressure reading around 130/80. She has history of borderline hypertension and was started on low-dose ACE inhibitor June 2021 lisinopril 2.5 mg for short period time for few weeks but had GI upset and stopped.  She is currently on no blood pressure medications  She has hyperlipidemia and is on atorvastatin 10 mg daily called regarding scheduled appointment. Measures reviewed to reduce transmission of virus. Refills of medications reviewed and done if needed.  -2024-reports onset of URI symptoms with slight nasal congestion and a cough at night related to postnasal drip, denies any problems with hearing All concerns addressed and to call if an other needs arise. Aware of on-call service 24 hours around the clock when office is closed. In the office blood pressure 120/60 with a pulse of 77.  Weight 130 pounds with room air O2 saturation 98% 98%  Denies feeling dizziness, lightheadedness, palpitations or chest pain She does not smoke

## 2024-04-29 DIAGNOSIS — E78.5 HYPERLIPIDEMIA, UNSPECIFIED: ICD-10-CM

## 2024-04-29 DIAGNOSIS — J06.9 ACUTE UPPER RESPIRATORY INFECTION, UNSPECIFIED: ICD-10-CM

## 2024-06-10 ENCOUNTER — APPOINTMENT (OUTPATIENT)
Dept: OPHTHALMOLOGY | Facility: CLINIC | Age: 74
End: 2024-06-10
Payer: MEDICARE

## 2024-06-10 ENCOUNTER — NON-APPOINTMENT (OUTPATIENT)
Age: 74
End: 2024-06-10

## 2024-06-10 PROCEDURE — 92133 CPTRZD OPH DX IMG PST SGM ON: CPT

## 2024-06-10 PROCEDURE — 92012 INTRM OPH EXAM EST PATIENT: CPT

## 2024-06-10 PROCEDURE — 95060 OPH MUCOUS MEMBRANE TESTS: CPT

## 2024-06-13 ENCOUNTER — APPOINTMENT (OUTPATIENT)
Dept: PULMONOLOGY | Facility: CLINIC | Age: 74
End: 2024-06-13
Payer: MEDICARE

## 2024-06-13 VITALS
HEIGHT: 62 IN | RESPIRATION RATE: 16 BRPM | DIASTOLIC BLOOD PRESSURE: 76 MMHG | HEART RATE: 70 BPM | BODY MASS INDEX: 23.92 KG/M2 | SYSTOLIC BLOOD PRESSURE: 128 MMHG | WEIGHT: 130 LBS | OXYGEN SATURATION: 98 %

## 2024-06-13 DIAGNOSIS — R05.3 CHRONIC COUGH: ICD-10-CM

## 2024-06-13 PROCEDURE — 99204 OFFICE O/P NEW MOD 45 MIN: CPT

## 2024-06-13 RX ORDER — FLUTICASONE PROPIONATE 50 UG/1
50 SPRAY, METERED NASAL TWICE DAILY
Qty: 1 | Refills: 5 | Status: ACTIVE | COMMUNITY
Start: 2024-06-13 | End: 1900-01-01

## 2024-06-13 NOTE — ASSESSMENT
[FreeTextEntry1] : 74 year old woman with hypertension, hypothyroidism, prediabetes and osteoporosis comes in for evaluation of cough.  She is a lifelong non smoker but has a strong FH of pulmonary fibrosis- 2 brothers and one sister were diagnosed.  Also with history of LTBI not treated.    ON PE today VSS She is afebrile.  Oxygen saturation is 98% RA Lungs are clear Cor RRR Ext without edema.   Impression:  cough likely secondary to upper airway cough syndrome.  NO clinical evidence of ILD>  Plan: - continue fluticasone nasal spray twice dailiy - complete PFT - If PFT abnormal will obtain CT Chest.

## 2024-06-13 NOTE — HISTORY OF PRESENT ILLNESS
[Never] : never [TextBox_4] : 74 year old woman with history of hypertension, hypothyroidism, prediabetes and osteoporosis.  Has allergic rhinitis and occasional reflux.  She comes in today for evaluation of intermittent cough for many years.  It is usually non productive.   She denies history of asthma.  Reports she and her family being diagnosed with LTBI in .  She started medication but did not complete.    Reports that 2 brothers  of pulmonary fibrosis,. Her sister also has pulmonary fibrosis, and has received transplant at [facility].  Denies fever or systemic complaints.   Also being treated for macular degeneration.

## 2024-06-13 NOTE — REVIEW OF SYSTEMS
[Dry Eyes] : dry eyes [Eye Irritation] : eye irritation [Cough] : cough [Dyspnea] : no dyspnea [Wheezing] : no wheezing [Negative] : Cardiovascular

## 2024-07-01 ENCOUNTER — APPOINTMENT (OUTPATIENT)
Dept: OPHTHALMOLOGY | Facility: CLINIC | Age: 74
End: 2024-07-01

## 2024-07-09 ENCOUNTER — APPOINTMENT (OUTPATIENT)
Dept: DERMATOLOGY | Facility: CLINIC | Age: 74
End: 2024-07-09
Payer: MEDICARE

## 2024-07-09 DIAGNOSIS — Z12.83 ENCOUNTER FOR SCREENING FOR MALIGNANT NEOPLASM OF SKIN: ICD-10-CM

## 2024-07-09 DIAGNOSIS — L21.9 SEBORRHEIC DERMATITIS, UNSPECIFIED: ICD-10-CM

## 2024-07-09 DIAGNOSIS — L82.1 OTHER SEBORRHEIC KERATOSIS: ICD-10-CM

## 2024-07-09 DIAGNOSIS — L85.3 XEROSIS CUTIS: ICD-10-CM

## 2024-07-09 DIAGNOSIS — D22.9 MELANOCYTIC NEVI, UNSPECIFIED: ICD-10-CM

## 2024-07-09 PROCEDURE — 99214 OFFICE O/P EST MOD 30 MIN: CPT

## 2024-07-09 RX ORDER — FLUOCINOLONE ACETONIDE 0.11 MG/ML
0.01 OIL TOPICAL
Qty: 1 | Refills: 2 | Status: ACTIVE | COMMUNITY
Start: 2024-07-09 | End: 1900-01-01

## 2024-07-22 ENCOUNTER — APPOINTMENT (OUTPATIENT)
Dept: OBGYN | Facility: CLINIC | Age: 74
End: 2024-07-22
Payer: MEDICARE

## 2024-07-22 VITALS — DIASTOLIC BLOOD PRESSURE: 73 MMHG | WEIGHT: 135 LBS | SYSTOLIC BLOOD PRESSURE: 129 MMHG | BODY MASS INDEX: 24.69 KG/M2

## 2024-07-22 DIAGNOSIS — R10.2 PELVIC AND PERINEAL PAIN: ICD-10-CM

## 2024-07-22 DIAGNOSIS — Z01.419 ENCOUNTER FOR GYNECOLOGICAL EXAMINATION (GENERAL) (ROUTINE) W/OUT ABNORMAL FINDINGS: ICD-10-CM

## 2024-07-22 PROCEDURE — G0101: CPT

## 2024-07-22 NOTE — HISTORY OF PRESENT ILLNESS
[Patient reported mammogram was normal] : Patient reported mammogram was normal [Patient reported PAP Smear was normal] : Patient reported PAP Smear was normal [Patient reported bone density results were normal] : Patient reported bone density results were normal [Patient reported colonoscopy was normal] : Patient reported colonoscopy was normal [FreeTextEntry1] : 75YO P2 LMP 2000 noting some LLQ pain which she relates to constipation. [Mammogramdate] : 10/23 [PapSmeardate] : 2022 [BoneDensityDate] : 10/21 [TextBox_37] : Tfem neck=-1.4 [ColonoscopyDate] : 2020

## 2024-07-22 NOTE — PHYSICAL EXAM
[Chaperone Present] : A chaperone was present in the examining room during all aspects of the physical examination [[id number]] : A chaperone was present during the pelvic exam. [Appropriately responsive] : appropriately responsive [FreeTextEntry2] : Rao [Alert] : alert [No Acute Distress] : no acute distress [No Lymphadenopathy] : no lymphadenopathy [Regular Rate Rhythm] : regular rate rhythm [No Murmurs] : no murmurs [Clear to Auscultation B/L] : clear to auscultation bilaterally [Soft] : soft [Non-tender] : non-tender [Non-distended] : non-distended [No HSM] : No HSM [No Lesions] : no lesions [No Mass] : no mass [Oriented x3] : oriented x3 [Examination Of The Breasts] : a normal appearance [No Masses] : no breast masses were palpable [Labia Majora] : normal [Labia Minora] : normal [Atrophy] : atrophy [No Bleeding] : There was no active vaginal bleeding [Normal] : normal [Absent] : absent [Uterine Adnexae] : normal [FreeTextEntry6] : Tender over left inguinal area

## 2024-07-30 LAB — CYTOLOGY CVX/VAG DOC THIN PREP: ABNORMAL

## 2024-08-01 ENCOUNTER — APPOINTMENT (OUTPATIENT)
Dept: ULTRASOUND IMAGING | Facility: CLINIC | Age: 74
End: 2024-08-01
Payer: MEDICARE

## 2024-08-01 ENCOUNTER — OUTPATIENT (OUTPATIENT)
Dept: OUTPATIENT SERVICES | Facility: HOSPITAL | Age: 74
LOS: 1 days | End: 2024-08-01
Payer: MEDICARE

## 2024-08-01 DIAGNOSIS — H26.9 UNSPECIFIED CATARACT: Chronic | ICD-10-CM

## 2024-08-01 DIAGNOSIS — H02.209 UNSPECIFIED LAGOPHTHALMOS UNSPECIFIED EYE, UNSPECIFIED EYELID: Chronic | ICD-10-CM

## 2024-08-01 DIAGNOSIS — R10.2 PELVIC AND PERINEAL PAIN: ICD-10-CM

## 2024-08-01 DIAGNOSIS — Z00.8 ENCOUNTER FOR OTHER GENERAL EXAMINATION: ICD-10-CM

## 2024-08-01 PROCEDURE — 76856 US EXAM PELVIC COMPLETE: CPT | Mod: 26

## 2024-08-01 PROCEDURE — 76830 TRANSVAGINAL US NON-OB: CPT | Mod: 26

## 2024-08-01 PROCEDURE — 76830 TRANSVAGINAL US NON-OB: CPT

## 2024-08-01 PROCEDURE — 76856 US EXAM PELVIC COMPLETE: CPT

## 2024-08-08 ENCOUNTER — APPOINTMENT (OUTPATIENT)
Dept: PULMONOLOGY | Facility: CLINIC | Age: 74
End: 2024-08-08

## 2024-08-08 PROCEDURE — 99214 OFFICE O/P EST MOD 30 MIN: CPT | Mod: 25

## 2024-08-08 PROCEDURE — ZZZZZ: CPT

## 2024-08-08 PROCEDURE — 94726 PLETHYSMOGRAPHY LUNG VOLUMES: CPT

## 2024-08-08 PROCEDURE — 94729 DIFFUSING CAPACITY: CPT

## 2024-08-08 PROCEDURE — 94010 BREATHING CAPACITY TEST: CPT

## 2024-08-08 NOTE — PHYSICAL EXAM
[No Acute Distress] : no acute distress [Normal Appearance] : normal appearance [No Neck Mass] : no neck mass [Normal Rate/Rhythm] : normal rate/rhythm [Normal S1, S2] : normal s1, s2 [No Murmurs] : no murmurs [No Resp Distress] : no resp distress [Clear to Auscultation Bilaterally] : clear to auscultation bilaterally [No Abnormalities] : no abnormalities [Normal Gait] : normal gait [No Clubbing] : no clubbing [No Cyanosis] : no cyanosis [No Edema] : no edema [Oriented x3] : oriented x3 [Normal Affect] : normal affect [TextBox_11] : clear mucous seen in posterior pharynx

## 2024-08-08 NOTE — HISTORY OF PRESENT ILLNESS
[Never] : never [TextBox_4] : 74 year old woman with history of hypertension, hypothyroidism, prediabetes and osteoporosis.  Has allergic rhinitis and occasional reflux.  She comes in today for evaluation of intermittent cough for many years.  It is usually non productive.   She denies history of asthma.  Reports she and her family being diagnosed with LTBI in .  She started medication but did not complete.    Reports that 2 brothers  of pulmonary fibrosis,. Her sister also has pulmonary fibrosis, and has received transplant at [facility].  Denies fever or systemic complaints.   Also being treated for macular degeneration.   INitially evaluated in 2024 and thought to have UACS.  Advised fluticasone nasal spray twice daily and complete PFT.   Returns for follow up today.   She is feeling better - cough is improved.  REports persistent mucous and nasal congestion.

## 2024-08-08 NOTE — REVIEW OF SYSTEMS
[Nasal Congestion] : nasal congestion [Postnasal Drip] : postnasal drip [Cough] : cough [Sputum] : sputum [GERD] : gerd [Negative] : Cardiovascular

## 2024-09-09 ENCOUNTER — APPOINTMENT (OUTPATIENT)
Dept: INTERNAL MEDICINE | Facility: CLINIC | Age: 74
End: 2024-09-09

## 2024-09-21 ENCOUNTER — NON-APPOINTMENT (OUTPATIENT)
Age: 74
End: 2024-09-21

## 2024-09-30 ENCOUNTER — LABORATORY RESULT (OUTPATIENT)
Age: 74
End: 2024-09-30

## 2024-09-30 ENCOUNTER — MED ADMIN CHARGE (OUTPATIENT)
Age: 74
End: 2024-09-30

## 2024-09-30 ENCOUNTER — APPOINTMENT (OUTPATIENT)
Dept: INTERNAL MEDICINE | Facility: CLINIC | Age: 74
End: 2024-09-30
Payer: MEDICARE

## 2024-09-30 ENCOUNTER — OUTPATIENT (OUTPATIENT)
Dept: OUTPATIENT SERVICES | Facility: HOSPITAL | Age: 74
LOS: 1 days | End: 2024-09-30
Payer: MEDICARE

## 2024-09-30 VITALS
HEIGHT: 62 IN | BODY MASS INDEX: 24.29 KG/M2 | HEART RATE: 81 BPM | OXYGEN SATURATION: 98 % | WEIGHT: 132 LBS | SYSTOLIC BLOOD PRESSURE: 140 MMHG | DIASTOLIC BLOOD PRESSURE: 70 MMHG

## 2024-09-30 DIAGNOSIS — H02.209 UNSPECIFIED LAGOPHTHALMOS UNSPECIFIED EYE, UNSPECIFIED EYELID: Chronic | ICD-10-CM

## 2024-09-30 DIAGNOSIS — H26.9 UNSPECIFIED CATARACT: Chronic | ICD-10-CM

## 2024-09-30 DIAGNOSIS — N39.0 URINARY TRACT INFECTION, SITE NOT SPECIFIED: ICD-10-CM

## 2024-09-30 PROCEDURE — G0008: CPT

## 2024-09-30 PROCEDURE — 87186 SC STD MICRODIL/AGAR DIL: CPT

## 2024-09-30 PROCEDURE — 90662 IIV NO PRSV INCREASED AG IM: CPT

## 2024-09-30 PROCEDURE — 81003 URINALYSIS AUTO W/O SCOPE: CPT

## 2024-09-30 PROCEDURE — 81001 URINALYSIS AUTO W/SCOPE: CPT

## 2024-09-30 PROCEDURE — 87086 URINE CULTURE/COLONY COUNT: CPT

## 2024-09-30 PROCEDURE — G0463: CPT | Mod: 25

## 2024-09-30 PROCEDURE — 96372 THER/PROPH/DIAG INJ SC/IM: CPT

## 2024-09-30 PROCEDURE — 99205 OFFICE O/P NEW HI 60 MIN: CPT | Mod: GC,25

## 2024-09-30 RX ORDER — CEFTRIAXONE 1 G/1
1 INJECTION, POWDER, FOR SOLUTION INTRAMUSCULAR; INTRAVENOUS
Qty: 1 | Refills: 0 | Status: COMPLETED | OUTPATIENT
Start: 2024-09-30

## 2024-09-30 RX ORDER — CEFTRIAXONE 1 G/1
1 INJECTION, POWDER, FOR SOLUTION INTRAMUSCULAR; INTRAVENOUS
Qty: 1 | Refills: 0 | Status: ACTIVE | COMMUNITY
Start: 2024-09-30 | End: 1900-01-01

## 2024-09-30 RX ORDER — CEFPODOXIME PROXETIL 200 MG/1
200 TABLET, FILM COATED ORAL TWICE DAILY
Qty: 20 | Refills: 0 | Status: ACTIVE | COMMUNITY
Start: 2024-09-30 | End: 1900-01-01

## 2024-09-30 RX ADMIN — CEFTRIAXONE GM: 1 INJECTION, POWDER, FOR SOLUTION INTRAMUSCULAR; INTRAVENOUS at 00:00

## 2024-09-30 NOTE — HISTORY OF PRESENT ILLNESS
[FreeTextEntry8] : 74-year-old female with history of HTN, hypothyroidism, prediabetes and osteoporosis, presents for follow up. Last seen in clinic in April 2024 Had a prior UTI in 1980s, had a cystoscopy.   Presenting for acute visit for UTI, went to urgent care on 9/22 received keflex 500 q12 for 5 days but pain did not improve. Urine culture with >100k E coli, reports dark colored urine. Urine culture at urgent care was resistant to cipro, levofloxacin, and bactrim. Traveled in August to [place], [place] and [place].  Reports she is still having persistent burning with urination and chills at home, low grade fevers 99 F reported. Reports fatigue. No sick contacts. Currently works as a .

## 2024-09-30 NOTE — HISTORY OF PRESENT ILLNESS
[FreeTextEntry8] : 74-year-old female with history of HTN, hypothyroidism, prediabetes and osteoporosis, presents for follow up. Last seen in clinic in April 2024 Had a prior UTI in 1980s, had a cystoscopy.   Presenting for acute visit for UTI, went to urgent care on 9/22 received keflex 500 q12 for 5 days but pain did not improve. Urine culture with >100k E coli, reports dark colored urine. Urine culture at urgent care was resistant to cipro, levofloxacin, and bactrim. Traveled in August to [place], [place] and [place].  Reports she is still having persistent burning with urination and chills at home, low grade fevers 99 F reported. Reports fatigue. No sick contacts. Currently works as a .

## 2024-09-30 NOTE — HISTORY OF PRESENT ILLNESS
[FreeTextEntry8] : 74-year-old female with history of HTN, hypothyroidism, prediabetes and osteoporosis, presents for follow up. Last seen in clinic in April 2024 Had a prior UTI in 1980s, had a cystoscopy.   Presenting for acute visit for UTI, went to urgent care on 9/22 received keflex 500 q12 for 5 days but pain did not improve. Urine culture with >100k E coli, reports dark colored urine. Urine culture at urgent care was resistant to cipro, levofloxacin, and bactrim. Traveled in August to [place], [place] and [place].  Reports she is still having persistent burning with urination and chills at home, low grade fevers 99 F reported. Reports fatigue. No sick contacts. Currently works as a .

## 2024-10-01 DIAGNOSIS — I10 ESSENTIAL (PRIMARY) HYPERTENSION: ICD-10-CM

## 2024-10-03 LAB
APPEARANCE: CLEAR
BILIRUB UR QL STRIP: NORMAL
BILIRUBIN URINE: NEGATIVE
BLOOD URINE: ABNORMAL
COLOR: YELLOW
GLUCOSE QUALITATIVE U: NEGATIVE MG/DL
GLUCOSE UR-MCNC: NORMAL
HCG UR QL: 0.2 EU/DL
HGB UR QL STRIP.AUTO: NORMAL
KETONES UR-MCNC: NORMAL
KETONES URINE: NEGATIVE MG/DL
LEUKOCYTE ESTERASE UR QL STRIP: NORMAL
LEUKOCYTE ESTERASE URINE: ABNORMAL
NITRITE UR QL STRIP: NORMAL
NITRITE URINE: POSITIVE
PH UR STRIP: 6
PH URINE: 6
PROT UR STRIP-MCNC: NORMAL
PROTEIN URINE: NEGATIVE MG/DL
SP GR UR STRIP: 1.01
SPECIFIC GRAVITY URINE: 1.01
UROBILINOGEN URINE: 0.2 MG/DL

## 2024-10-04 ENCOUNTER — NON-APPOINTMENT (OUTPATIENT)
Age: 74
End: 2024-10-04

## 2024-10-07 ENCOUNTER — APPOINTMENT (OUTPATIENT)
Dept: INTERNAL MEDICINE | Facility: CLINIC | Age: 74
End: 2024-10-07
Payer: MEDICARE

## 2024-10-07 ENCOUNTER — OUTPATIENT (OUTPATIENT)
Dept: OUTPATIENT SERVICES | Facility: HOSPITAL | Age: 74
LOS: 1 days | End: 2024-10-07
Payer: MEDICARE

## 2024-10-07 VITALS
SYSTOLIC BLOOD PRESSURE: 140 MMHG | HEART RATE: 72 BPM | BODY MASS INDEX: 24.2 KG/M2 | WEIGHT: 131.5 LBS | DIASTOLIC BLOOD PRESSURE: 90 MMHG | HEIGHT: 62 IN | OXYGEN SATURATION: 98 %

## 2024-10-07 DIAGNOSIS — H02.209 UNSPECIFIED LAGOPHTHALMOS UNSPECIFIED EYE, UNSPECIFIED EYELID: Chronic | ICD-10-CM

## 2024-10-07 DIAGNOSIS — R39.9 UNSPECIFIED SYMPTOMS AND SIGNS INVOLVING THE GENITOURINARY SYSTEM: ICD-10-CM

## 2024-10-07 DIAGNOSIS — I10 ESSENTIAL (PRIMARY) HYPERTENSION: ICD-10-CM

## 2024-10-07 DIAGNOSIS — R21 RASH AND OTHER NONSPECIFIC SKIN ERUPTION: ICD-10-CM

## 2024-10-07 DIAGNOSIS — H26.9 UNSPECIFIED CATARACT: Chronic | ICD-10-CM

## 2024-10-07 PROCEDURE — 99214 OFFICE O/P EST MOD 30 MIN: CPT | Mod: GC

## 2024-10-09 NOTE — ED PROVIDER NOTE - NEUROLOGICAL, MLM
DOS 10/15/24. Pre procedural telephone interview complete with Veronica. Patient/representative verbalizes arrival time TBD by MD office and place SDS for Procedure(s) (LRB):  ROBOTIC REDO LAPAROSCOPY, REVERSAL OF PRIOR FUNDOPLICATION REDO TOUPET FUNDOPLICATION. (N/A).    NPO status MN and medications venlafaxine with only sips of water as per anesthesia protocol on DOS. Patient instructed to hold vitamins and supplements per proceduralist instructions.  Patient/representative instructed to shower with antibacterial soap per protocol.  After hospital discharge patient will be going home with family. Patient/representative is aware of need to have responsible adult with them overnight. Patient/representative acknowledged understanding to the plan of care and had no further questions/concerns.     Patient/representative aware if they are ill (cough, fever, etc.) to call their MD. Patient/representative aware of current visitor policy and  parking no longer available.       Alert and oriented, no focal deficits, no motor or sensory deficits.

## 2024-10-14 DIAGNOSIS — Z23 ENCOUNTER FOR IMMUNIZATION: ICD-10-CM

## 2024-10-14 DIAGNOSIS — N39.0 URINARY TRACT INFECTION, SITE NOT SPECIFIED: ICD-10-CM

## 2024-10-17 ENCOUNTER — LABORATORY RESULT (OUTPATIENT)
Age: 74
End: 2024-10-17

## 2024-10-17 PROCEDURE — G0463: CPT

## 2024-10-17 PROCEDURE — 81001 URINALYSIS AUTO W/SCOPE: CPT

## 2024-10-17 PROCEDURE — 87086 URINE CULTURE/COLONY COUNT: CPT

## 2024-10-18 LAB
APPEARANCE: CLEAR
BILIRUBIN URINE: NEGATIVE
BLOOD URINE: ABNORMAL
COLOR: YELLOW
GLUCOSE QUALITATIVE U: NEGATIVE MG/DL
KETONES URINE: NEGATIVE MG/DL
LEUKOCYTE ESTERASE URINE: ABNORMAL
NITRITE URINE: NEGATIVE
PH URINE: 5.5
PROTEIN URINE: NEGATIVE MG/DL
SPECIFIC GRAVITY URINE: 1.01
UROBILINOGEN URINE: 0.2 MG/DL

## 2024-10-19 LAB — BACTERIA UR CULT: NORMAL

## 2024-10-21 ENCOUNTER — APPOINTMENT (OUTPATIENT)
Dept: DERMATOLOGY | Facility: CLINIC | Age: 74
End: 2024-10-21
Payer: MEDICARE

## 2024-10-21 DIAGNOSIS — R21 RASH AND OTHER NONSPECIFIC SKIN ERUPTION: ICD-10-CM

## 2024-10-21 DIAGNOSIS — L30.0 NUMMULAR DERMATITIS: ICD-10-CM

## 2024-10-21 DIAGNOSIS — L21.9 SEBORRHEIC DERMATITIS, UNSPECIFIED: ICD-10-CM

## 2024-10-21 DIAGNOSIS — R39.9 UNSPECIFIED SYMPTOMS AND SIGNS INVOLVING THE GENITOURINARY SYSTEM: ICD-10-CM

## 2024-10-21 PROCEDURE — 99214 OFFICE O/P EST MOD 30 MIN: CPT

## 2024-10-21 RX ORDER — CLOBETASOL PROPIONATE 0.5 MG/G
0.05 OINTMENT TOPICAL
Qty: 1 | Refills: 1 | Status: ACTIVE | COMMUNITY
Start: 2024-10-21 | End: 1900-01-01

## 2024-10-21 RX ORDER — KETOCONAZOLE 20 MG/ML
2 SUSPENSION TOPICAL
Qty: 1 | Refills: 2 | Status: ACTIVE | COMMUNITY
Start: 2024-10-21 | End: 1900-01-01

## 2024-11-08 ENCOUNTER — NON-APPOINTMENT (OUTPATIENT)
Age: 74
End: 2024-11-08

## 2024-11-18 ENCOUNTER — APPOINTMENT (OUTPATIENT)
Dept: MAMMOGRAPHY | Facility: IMAGING CENTER | Age: 74
End: 2024-11-18
Payer: MEDICARE

## 2024-11-18 ENCOUNTER — RESULT REVIEW (OUTPATIENT)
Age: 74
End: 2024-11-18

## 2024-11-18 ENCOUNTER — OUTPATIENT (OUTPATIENT)
Dept: OUTPATIENT SERVICES | Facility: HOSPITAL | Age: 74
LOS: 1 days | End: 2024-11-18
Payer: MEDICARE

## 2024-11-18 DIAGNOSIS — H02.209 UNSPECIFIED LAGOPHTHALMOS UNSPECIFIED EYE, UNSPECIFIED EYELID: Chronic | ICD-10-CM

## 2024-11-18 DIAGNOSIS — Z00.00 ENCOUNTER FOR GENERAL ADULT MEDICAL EXAMINATION WITHOUT ABNORMAL FINDINGS: ICD-10-CM

## 2024-11-18 DIAGNOSIS — H26.9 UNSPECIFIED CATARACT: Chronic | ICD-10-CM

## 2024-11-18 DIAGNOSIS — Z00.8 ENCOUNTER FOR OTHER GENERAL EXAMINATION: ICD-10-CM

## 2024-11-18 PROCEDURE — 77067 SCR MAMMO BI INCL CAD: CPT

## 2024-11-18 PROCEDURE — 77067 SCR MAMMO BI INCL CAD: CPT | Mod: 26

## 2024-11-18 PROCEDURE — 77063 BREAST TOMOSYNTHESIS BI: CPT

## 2024-11-18 PROCEDURE — 77063 BREAST TOMOSYNTHESIS BI: CPT | Mod: 26

## 2024-11-20 ENCOUNTER — NON-APPOINTMENT (OUTPATIENT)
Age: 74
End: 2024-11-20

## 2024-12-12 ENCOUNTER — LABORATORY RESULT (OUTPATIENT)
Age: 74
End: 2024-12-12

## 2024-12-12 ENCOUNTER — NON-APPOINTMENT (OUTPATIENT)
Age: 74
End: 2024-12-12

## 2024-12-12 ENCOUNTER — APPOINTMENT (OUTPATIENT)
Dept: UROLOGY | Facility: CLINIC | Age: 74
End: 2024-12-12

## 2024-12-12 VITALS — SYSTOLIC BLOOD PRESSURE: 149 MMHG | DIASTOLIC BLOOD PRESSURE: 82 MMHG | HEART RATE: 78 BPM

## 2024-12-12 DIAGNOSIS — N95.2 POSTMENOPAUSAL ATROPHIC VAGINITIS: ICD-10-CM

## 2024-12-12 DIAGNOSIS — R31.29 OTHER MICROSCOPIC HEMATURIA: ICD-10-CM

## 2024-12-12 DIAGNOSIS — R39.15 URGENCY OF URINATION: ICD-10-CM

## 2024-12-12 PROCEDURE — 99204 OFFICE O/P NEW MOD 45 MIN: CPT

## 2024-12-16 ENCOUNTER — APPOINTMENT (OUTPATIENT)
Dept: ULTRASOUND IMAGING | Facility: CLINIC | Age: 74
End: 2024-12-16
Payer: MEDICARE

## 2024-12-16 LAB
APPEARANCE: CLEAR
BILIRUBIN URINE: NEGATIVE
BLOOD URINE: ABNORMAL
COLOR: YELLOW
GLUCOSE QUALITATIVE U: NEGATIVE MG/DL
KETONES URINE: NEGATIVE MG/DL
LEUKOCYTE ESTERASE URINE: ABNORMAL
NITRITE URINE: NEGATIVE
PH URINE: 6
PROTEIN URINE: NEGATIVE MG/DL
SPECIFIC GRAVITY URINE: 1.01
URINE CYTOLOGY: NORMAL
UROBILINOGEN URINE: 0.2 MG/DL

## 2024-12-16 PROCEDURE — 76775 US EXAM ABDO BACK WALL LIM: CPT

## 2025-01-02 ENCOUNTER — OUTPATIENT (OUTPATIENT)
Dept: OUTPATIENT SERVICES | Facility: HOSPITAL | Age: 75
LOS: 1 days | End: 2025-01-02
Payer: MEDICARE

## 2025-01-02 ENCOUNTER — LABORATORY RESULT (OUTPATIENT)
Age: 75
End: 2025-01-02

## 2025-01-02 ENCOUNTER — APPOINTMENT (OUTPATIENT)
Dept: UROLOGY | Facility: CLINIC | Age: 75
End: 2025-01-02
Payer: MEDICARE

## 2025-01-02 ENCOUNTER — APPOINTMENT (OUTPATIENT)
Dept: UROLOGY | Facility: CLINIC | Age: 75
End: 2025-01-02

## 2025-01-02 DIAGNOSIS — R39.15 URGENCY OF URINATION: ICD-10-CM

## 2025-01-02 DIAGNOSIS — H02.209 UNSPECIFIED LAGOPHTHALMOS UNSPECIFIED EYE, UNSPECIFIED EYELID: Chronic | ICD-10-CM

## 2025-01-02 DIAGNOSIS — H26.9 UNSPECIFIED CATARACT: Chronic | ICD-10-CM

## 2025-01-02 DIAGNOSIS — R31.29 OTHER MICROSCOPIC HEMATURIA: ICD-10-CM

## 2025-01-02 DIAGNOSIS — R35.0 FREQUENCY OF MICTURITION: ICD-10-CM

## 2025-01-02 PROCEDURE — 52000 CYSTOURETHROSCOPY: CPT

## 2025-01-02 PROCEDURE — 99212 OFFICE O/P EST SF 10 MIN: CPT | Mod: 25

## 2025-01-05 LAB
APPEARANCE: CLEAR
BILIRUBIN URINE: NEGATIVE
BLOOD URINE: ABNORMAL
COLOR: YELLOW
GLUCOSE QUALITATIVE U: NEGATIVE MG/DL
KETONES URINE: NEGATIVE MG/DL
LEUKOCYTE ESTERASE URINE: ABNORMAL
NITRITE URINE: NEGATIVE
PH URINE: 6
PROTEIN URINE: NEGATIVE MG/DL
SPECIFIC GRAVITY URINE: 1.01
UROBILINOGEN URINE: 0.2 MG/DL

## 2025-01-06 DIAGNOSIS — R39.15 URGENCY OF URINATION: ICD-10-CM

## 2025-01-06 DIAGNOSIS — R31.29 OTHER MICROSCOPIC HEMATURIA: ICD-10-CM

## 2025-01-13 ENCOUNTER — APPOINTMENT (OUTPATIENT)
Dept: OPHTHALMOLOGY | Facility: CLINIC | Age: 75
End: 2025-01-13

## 2025-02-27 ENCOUNTER — OUTPATIENT (OUTPATIENT)
Dept: OUTPATIENT SERVICES | Facility: HOSPITAL | Age: 75
LOS: 1 days | End: 2025-02-27
Payer: MEDICARE

## 2025-02-27 ENCOUNTER — APPOINTMENT (OUTPATIENT)
Dept: INTERNAL MEDICINE | Facility: CLINIC | Age: 75
End: 2025-02-27
Payer: MEDICARE

## 2025-02-27 VITALS
HEIGHT: 62 IN | BODY MASS INDEX: 24.29 KG/M2 | SYSTOLIC BLOOD PRESSURE: 130 MMHG | HEART RATE: 84 BPM | WEIGHT: 132 LBS | OXYGEN SATURATION: 98 % | DIASTOLIC BLOOD PRESSURE: 86 MMHG

## 2025-02-27 DIAGNOSIS — M81.0 AGE-RELATED OSTEOPOROSIS W/OUT CURRENT PATHOLOGICAL FRACTURE: ICD-10-CM

## 2025-02-27 DIAGNOSIS — H02.209 UNSPECIFIED LAGOPHTHALMOS UNSPECIFIED EYE, UNSPECIFIED EYELID: Chronic | ICD-10-CM

## 2025-02-27 DIAGNOSIS — I10 ESSENTIAL (PRIMARY) HYPERTENSION: ICD-10-CM

## 2025-02-27 DIAGNOSIS — H26.9 UNSPECIFIED CATARACT: Chronic | ICD-10-CM

## 2025-02-27 DIAGNOSIS — E78.5 HYPERLIPIDEMIA, UNSPECIFIED: ICD-10-CM

## 2025-02-27 DIAGNOSIS — E03.9 HYPOTHYROIDISM, UNSPECIFIED: ICD-10-CM

## 2025-02-27 PROCEDURE — G0439: CPT

## 2025-03-04 LAB
24R-OH-CALCIDIOL SERPL-MCNC: 32 PG/ML
ALBUMIN SERPL ELPH-MCNC: 4.1 G/DL
ALP BLD-CCNC: 92 U/L
ALT SERPL-CCNC: 12 U/L
ANION GAP SERPL CALC-SCNC: 8 MMOL/L
AST SERPL-CCNC: 24 U/L
BILIRUB SERPL-MCNC: 0.2 MG/DL
BUN SERPL-MCNC: 17 MG/DL
CALCIUM SERPL-MCNC: 9.8 MG/DL
CALCIUM SERPL-MCNC: 9.8 MG/DL
CHLORIDE SERPL-SCNC: 102 MMOL/L
CHOLEST SERPL-MCNC: 172 MG/DL
CO2 SERPL-SCNC: 28 MMOL/L
CREAT SERPL-MCNC: 0.67 MG/DL
EGFR: 92 ML/MIN/1.73M2
ESTIMATED AVERAGE GLUCOSE: 131 MG/DL
GLUCOSE SERPL-MCNC: 101 MG/DL
HBA1C MFR BLD HPLC: 6.2 %
HCT VFR BLD CALC: 38.6 %
HDLC SERPL-MCNC: 58 MG/DL
HGB BLD-MCNC: 12.3 G/DL
LDLC SERPL CALC-MCNC: 93 MG/DL
MCHC RBC-ENTMCNC: 29.7 PG
MCHC RBC-ENTMCNC: 31.9 G/DL
MCV RBC AUTO: 93.2 FL
NONHDLC SERPL-MCNC: 114 MG/DL
PARATHYROID HORMONE INTACT: 32 PG/ML
PLATELET # BLD AUTO: 292 K/UL
POTASSIUM SERPL-SCNC: 5.9 MMOL/L
PROT SERPL-MCNC: 7.8 G/DL
RBC # BLD: 4.14 M/UL
RBC # FLD: 13.4 %
SODIUM SERPL-SCNC: 138 MMOL/L
T4 FREE SERPL-MCNC: 1.3 NG/DL
TRIGL SERPL-MCNC: 117 MG/DL
TSH SERPL-ACNC: 0.79 UIU/ML
VIT B12 SERPL-MCNC: 680 PG/ML
WBC # FLD AUTO: 8.91 K/UL

## 2025-03-06 ENCOUNTER — OUTPATIENT (OUTPATIENT)
Dept: OUTPATIENT SERVICES | Facility: HOSPITAL | Age: 75
LOS: 1 days | End: 2025-03-06
Payer: MEDICARE

## 2025-03-06 ENCOUNTER — APPOINTMENT (OUTPATIENT)
Dept: INTERNAL MEDICINE | Facility: CLINIC | Age: 75
End: 2025-03-06

## 2025-03-06 VITALS
DIASTOLIC BLOOD PRESSURE: 70 MMHG | BODY MASS INDEX: 24.29 KG/M2 | WEIGHT: 132 LBS | HEIGHT: 62 IN | OXYGEN SATURATION: 98 % | HEART RATE: 85 BPM | SYSTOLIC BLOOD PRESSURE: 140 MMHG

## 2025-03-06 DIAGNOSIS — H02.209 UNSPECIFIED LAGOPHTHALMOS UNSPECIFIED EYE, UNSPECIFIED EYELID: Chronic | ICD-10-CM

## 2025-03-06 DIAGNOSIS — H26.9 UNSPECIFIED CATARACT: Chronic | ICD-10-CM

## 2025-03-06 DIAGNOSIS — I10 ESSENTIAL (PRIMARY) HYPERTENSION: ICD-10-CM

## 2025-03-06 DIAGNOSIS — R39.15 URGENCY OF URINATION: ICD-10-CM

## 2025-03-06 DIAGNOSIS — R30.0 DYSURIA: ICD-10-CM

## 2025-03-06 DIAGNOSIS — N30.01 ACUTE CYSTITIS WITH HEMATURIA: ICD-10-CM

## 2025-03-06 DIAGNOSIS — E87.5 HYPERKALEMIA: ICD-10-CM

## 2025-03-06 LAB
APPEARANCE: ABNORMAL
BACTERIA: ABNORMAL /HPF
BILIRUB UR QL STRIP: NORMAL
BILIRUBIN URINE: NEGATIVE
BLOOD URINE: ABNORMAL
CAST: 3 /LPF
CLARITY UR: NORMAL
COLLECTION METHOD: NORMAL
COLOR: YELLOW
EPITHELIAL CELLS: 2 /HPF
GLUCOSE QUALITATIVE U: NEGATIVE MG/DL
GLUCOSE UR-MCNC: NORMAL
HCG UR QL: 0.2 EU/DL
HGB UR QL STRIP.AUTO: NORMAL
KETONES UR-MCNC: NORMAL
KETONES URINE: NEGATIVE MG/DL
LEUKOCYTE ESTERASE UR QL STRIP: NORMAL
LEUKOCYTE ESTERASE URINE: ABNORMAL
MICROSCOPIC-UA: NORMAL
NITRITE UR QL STRIP: NORMAL
NITRITE URINE: NEGATIVE
PH UR STRIP: 6
PH URINE: 6.5
PROT UR STRIP-MCNC: NORMAL
PROTEIN URINE: NEGATIVE MG/DL
RED BLOOD CELLS URINE: 4 /HPF
SP GR UR STRIP: 1.01
SPECIFIC GRAVITY URINE: 1.01
UROBILINOGEN URINE: 0.2 MG/DL
WHITE BLOOD CELLS URINE: 245 /HPF

## 2025-03-06 PROCEDURE — 99213 OFFICE O/P EST LOW 20 MIN: CPT

## 2025-03-06 PROCEDURE — G2211 COMPLEX E/M VISIT ADD ON: CPT

## 2025-03-06 PROCEDURE — G0439: CPT

## 2025-03-06 RX ORDER — NITROFURANTOIN MACROCRYSTALS 100 MG/1
100 CAPSULE ORAL
Qty: 14 | Refills: 0 | Status: ACTIVE | COMMUNITY
Start: 2025-03-06 | End: 1900-01-01

## 2025-03-07 LAB
ALBUMIN SERPL ELPH-MCNC: 4.2 G/DL
ANION GAP SERPL CALC-SCNC: 11 MMOL/L
BUN SERPL-MCNC: 16 MG/DL
CALCIUM SERPL-MCNC: 9.1 MG/DL
CHLORIDE SERPL-SCNC: 100 MMOL/L
CO2 SERPL-SCNC: 26 MMOL/L
CREAT SERPL-MCNC: 0.66 MG/DL
EGFRCR SERPLBLD CKD-EPI 2021: 91 ML/MIN/1.73M2
GLUCOSE SERPL-MCNC: 84 MG/DL
PHOSPHATE SERPL-MCNC: 4.6 MG/DL
POTASSIUM SERPL-SCNC: 4.6 MMOL/L
SODIUM SERPL-SCNC: 138 MMOL/L

## 2025-03-10 DIAGNOSIS — E78.5 HYPERLIPIDEMIA, UNSPECIFIED: ICD-10-CM

## 2025-03-10 DIAGNOSIS — E03.9 HYPOTHYROIDISM, UNSPECIFIED: ICD-10-CM

## 2025-03-10 DIAGNOSIS — Z00.00 ENCOUNTER FOR GENERAL ADULT MEDICAL EXAMINATION WITHOUT ABNORMAL FINDINGS: ICD-10-CM

## 2025-03-10 DIAGNOSIS — M81.0 AGE-RELATED OSTEOPOROSIS WITHOUT CURRENT PATHOLOGICAL FRACTURE: ICD-10-CM

## 2025-03-11 LAB — BACTERIA UR CULT: ABNORMAL

## 2025-03-13 DIAGNOSIS — Z00.00 ENCOUNTER FOR GENERAL ADULT MEDICAL EXAMINATION WITHOUT ABNORMAL FINDINGS: ICD-10-CM

## 2025-03-13 DIAGNOSIS — M81.0 AGE-RELATED OSTEOPOROSIS WITHOUT CURRENT PATHOLOGICAL FRACTURE: ICD-10-CM

## 2025-03-13 DIAGNOSIS — E78.5 HYPERLIPIDEMIA, UNSPECIFIED: ICD-10-CM

## 2025-03-13 DIAGNOSIS — E03.9 HYPOTHYROIDISM, UNSPECIFIED: ICD-10-CM

## 2025-03-20 ENCOUNTER — LABORATORY RESULT (OUTPATIENT)
Age: 75
End: 2025-03-20

## 2025-03-24 LAB
APPEARANCE: CLEAR
BACTERIA UR CULT: NORMAL
BILIRUBIN URINE: NEGATIVE
BLOOD URINE: ABNORMAL
COLOR: YELLOW
GLUCOSE QUALITATIVE U: NEGATIVE MG/DL
KETONES URINE: NEGATIVE MG/DL
LEUKOCYTE ESTERASE URINE: ABNORMAL
NITRITE URINE: NEGATIVE
PH URINE: 5.5
PROTEIN URINE: NEGATIVE MG/DL
SPECIFIC GRAVITY URINE: 1.01
UROBILINOGEN URINE: 0.2 MG/DL

## 2025-04-04 ENCOUNTER — APPOINTMENT (OUTPATIENT)
Dept: OPHTHALMOLOGY | Facility: CLINIC | Age: 75
End: 2025-04-04

## 2025-04-21 ENCOUNTER — APPOINTMENT (OUTPATIENT)
Dept: ENDOCRINOLOGY | Facility: CLINIC | Age: 75
End: 2025-04-21
Payer: MEDICARE

## 2025-04-21 VITALS
SYSTOLIC BLOOD PRESSURE: 120 MMHG | WEIGHT: 131 LBS | HEART RATE: 73 BPM | BODY MASS INDEX: 25.05 KG/M2 | HEIGHT: 60.5 IN | OXYGEN SATURATION: 99 % | DIASTOLIC BLOOD PRESSURE: 82 MMHG

## 2025-04-21 DIAGNOSIS — M81.0 AGE-RELATED OSTEOPOROSIS W/OUT CURRENT PATHOLOGICAL FRACTURE: ICD-10-CM

## 2025-04-21 DIAGNOSIS — E55.9 VITAMIN D DEFICIENCY, UNSPECIFIED: ICD-10-CM

## 2025-04-21 PROCEDURE — ZZZZZ: CPT

## 2025-04-21 PROCEDURE — 77080 DXA BONE DENSITY AXIAL: CPT

## 2025-04-21 PROCEDURE — 99205 OFFICE O/P NEW HI 60 MIN: CPT

## 2025-04-21 PROCEDURE — G2211 COMPLEX E/M VISIT ADD ON: CPT

## 2025-04-21 RX ADMIN — ZOLEDRONIC ACID 5 MG/100ML: 5 INJECTION INTRAVENOUS at 00:00

## 2025-05-07 NOTE — ADDENDUM
Subjective   Patient ID: Fracnesca is a 54 year old female.    Chief Complaint   Patient presents with    Office Visit      I have an area under my right arm that is swollen, soft, and puffy. It doesn't hurt. It's been a week since I noticed it.       This is a 54 years old female patient came in for lump in the right axillary area x 1 week patient states she noticed the lump about a week ago there is no tenderness no swelling no sign of infection no redness patient recently had CT scan of the soft tissue of the neck which shows enlarged thyroid otherwise no acute findings patient had an ultrasound of the thyroid which shows thyroid nodule.  Patient has swelling of the some mental area patient was seen by rheumatologist and have ordered CT scan of the soft tissue neck with and without contrast patient has a small lipoma anterior axillary line no tenderness no redness no sign of infection.  Denies any trauma patient is right-handed patient is referred to surgical general surgery for evaluation.  Vital signs are stable history of asthma sinus congestion anxiety depression hypertension GERD and hyperlipidemia patient is advised to follow-up with endocrinology and CT scan of the soft tissue neck with and without contrast continue follow-up with rheumatologist          Review of Systems   Constitutional:         Patient is comfortable complains of loose bowel movement with some mild cramping on and off for last 2 weeks after eating chicken salad   HENT:          Patient can smell and taste food did receive her COVID vaccination sinus congestion complains of pressure in the ears no facial tenderness complains of postural dizziness history of migraine patient had a CT scan done at [facility] reports are not available patient is referred to ENT for dizziness   Eyes:         Patient wears contact lenses   Respiratory:          History of asthma using inhaler as needed non-smoker   Cardiovascular: Negative.         Denies any  [FreeTextEntry1] : I, Basilia Dalton, have acted as a scribe and documented the above information for Dr. Martin on 05/16/2023.\par \par All medical record entries made by the Scribe were at my, Dr. Mario MD , direction and personally dictated by me on 05/16/2023 . I have reviewed the chart and agree that the record accurately reflects my personal performance of the history, physical exam, assessment and plan. I have also personally directed, reviewed, and agreed with the chart.\par  irregular heartbeats or palpitations no chest pain   Gastrointestinal:         Soft complains of bloated feeling after eating small amount of food occasional nausea history of appendectomy colonoscopy was done in 2024 and she was advised to repeat colonoscopy in 5 to 10 years ultrasound abdomen and pelvis   Endocrine: Negative.    Genitourinary:         History of PCOS patient last menstrual cycle was March 28   Musculoskeletal:         Resolved back pain ambulatory no loss of balance no edema no calf tenderness   Skin:         No skin rash   Neurological: Negative.         History of chronic headaches complains of postural dizziness history of vertigo patient advised to have a CT scan of the head and patient is started on meclizine 12.5 mg 3 times daily as needed informed patient about the effects of the medicine patient was seen at [facility] CT scan of the brain was done reports are not available patient diarrhea and vomiting has stopped but still complains of postural dizziness with nausea patient is started on Protonix and continue meclizine patient is referred to ENT     Objective   Physical Exam  Vitals and nursing note reviewed.   Constitutional:       Appearance: Normal appearance.   HENT:      Head: Normocephalic and atraumatic.      Comments: Resolving headache     Ears:      Comments: Nose throat ears are normal complains of postural dizziness does not wear headphones     Nose: Nose normal.      Comments: Can smell and taste food patient did receive her COVID vaccination sinus congestion difficulty breathing through the nose deviated nasal septum to the right     Mouth/Throat:      Mouth: Mucous membranes are moist.      Neck: Normal range of motion and neck supple.   Eyes:      Extraocular Movements: Extraocular movements intact.      Pupils: Pupils are equal, round, and reactive to light.      Comments: Patient recently had an eye exam patient wears glasses   Neck:      Comments: Supple no  chills no fever no sore throat thyromegaly present TSH is normal ultrasound thyroid has been ordered  Cardiovascular:      Rate and Rhythm: Normal rate and regular rhythm.      Comments: Regular S1-S2 present no chest pain blood pressure 120 /70 patient is on losartan HCT 50 /25 mg daily feeling better no irregular heartbeat or palpitation  Pulmonary:      Effort: Pulmonary effort is normal.      Breath sounds: Normal breath sounds.      Comments: Clear no wheezing no shortness of breath or cough non-smoker  Abdominal:      Palpations: Abdomen is soft.      Comments: Soft no masses no tenderness complains of bloated feeling nauseous and full after eating small amount of food Sister has gallstones bowel movements are loose no nausea occasional heartburn patient is on Protonix ultrasound abdomen and pelvis has been ordered patient has irregular menstrual cycle last menstrual cycle was on March 28   Genitourinary:     Comments: Last menstrual cycle last week history of PCOS follows up with GYN  Musculoskeletal:         General: Normal range of motion.      Comments: Patient is ambulatory resolved low back pain does not use a cane for ambulation   Skin:     General: Skin is warm.   Neurological:      General: No focal deficit present.      Mental Status: She is alert and oriented to person, place, and time.      Comments: Alert oriented history of chronic migraine and postural dizziness patient is referred to ENT CT scan was done at [facility] reports are not available       Assessment   Problem List Items Addressed This Visit          Cardiac and Vasculature    Benign essential hypertension    Mixed hyperlipidemia       Gastrointestinal and Abdominal    Hx of gastroesophageal reflux (GERD)       Pulmonary and Pneumonias    Mild intermittent asthma (CMD)     Other Visit Diagnoses         Lipoma of right upper extremity    -  Primary      Rheumatoid arthritis involving both knees, unspecified whether rheumatoid factor  present  (CMD)            Plan patient is referred to surgery for lipoma right axilla  Patient to continue all present treatments  Continue follow-up with endocrinology and general surgery  Patient to follow-up with GI  Stay on clear liquid diet  Follow-up with us after the workup is complete  Patient is scheduled to have CT soft tissue neck with and without contrast

## 2025-05-15 ENCOUNTER — APPOINTMENT (OUTPATIENT)
Dept: RHEUMATOLOGY | Facility: CLINIC | Age: 75
End: 2025-05-15
Payer: MEDICARE

## 2025-05-15 VITALS
RESPIRATION RATE: 16 BRPM | OXYGEN SATURATION: 97 % | HEART RATE: 69 BPM | SYSTOLIC BLOOD PRESSURE: 152 MMHG | DIASTOLIC BLOOD PRESSURE: 75 MMHG

## 2025-05-15 VITALS
HEART RATE: 70 BPM | TEMPERATURE: 97.2 F | OXYGEN SATURATION: 100 % | DIASTOLIC BLOOD PRESSURE: 81 MMHG | RESPIRATION RATE: 16 BRPM | SYSTOLIC BLOOD PRESSURE: 151 MMHG

## 2025-05-15 PROCEDURE — 96374 THER/PROPH/DIAG INJ IV PUSH: CPT

## 2025-06-30 ENCOUNTER — NON-APPOINTMENT (OUTPATIENT)
Age: 75
End: 2025-06-30

## 2025-06-30 ENCOUNTER — APPOINTMENT (OUTPATIENT)
Dept: OPHTHALMOLOGY | Facility: CLINIC | Age: 75
End: 2025-06-30
Payer: MEDICARE

## 2025-06-30 PROCEDURE — 92014 COMPRE OPH EXAM EST PT 1/>: CPT

## 2025-06-30 PROCEDURE — 92133 CPTRZD OPH DX IMG PST SGM ON: CPT

## 2025-07-14 ENCOUNTER — APPOINTMENT (OUTPATIENT)
Dept: RHEUMATOLOGY | Facility: CLINIC | Age: 75
End: 2025-07-14
Payer: MEDICARE

## 2025-07-14 VITALS
SYSTOLIC BLOOD PRESSURE: 118 MMHG | HEART RATE: 83 BPM | HEIGHT: 60.5 IN | BODY MASS INDEX: 25.05 KG/M2 | DIASTOLIC BLOOD PRESSURE: 70 MMHG | WEIGHT: 131 LBS | OXYGEN SATURATION: 98 %

## 2025-07-14 PROBLEM — M25.561 KNEE PAIN, BILATERAL: Status: ACTIVE | Noted: 2025-07-14

## 2025-07-14 PROBLEM — R53.83 OTHER FATIGUE: Status: ACTIVE | Noted: 2025-07-14

## 2025-07-14 LAB
25(OH)D3 SERPL-MCNC: 31.4 NG/ML
ALBUMIN SERPL ELPH-MCNC: 3.9 G/DL
ALP BLD-CCNC: 83 U/L
ALT SERPL-CCNC: 10 U/L
ANION GAP SERPL CALC-SCNC: 15 MMOL/L
AST SERPL-CCNC: 34 U/L
BILIRUB SERPL-MCNC: 0.2 MG/DL
BUN SERPL-MCNC: 16 MG/DL
CALCIUM SERPL-MCNC: 9.3 MG/DL
CCP AB SER IA-ACNC: <8 U/ML
CCP AB SER QL: NEGATIVE
CHLORIDE SERPL-SCNC: 104 MMOL/L
CHOLEST SERPL-MCNC: 174 MG/DL
CO2 SERPL-SCNC: 20 MMOL/L
CREAT SERPL-MCNC: 0.7 MG/DL
CREAT SPEC-SCNC: 107 MG/DL
CREAT/PROT UR: 0.1 RATIO
CRP SERPL-MCNC: <3 MG/L
EGFRCR SERPLBLD CKD-EPI 2021: 90 ML/MIN/1.73M2
FOLATE SERPL-MCNC: >20 NG/ML
HDLC SERPL-MCNC: 47 MG/DL
LDLC SERPL-MCNC: 103 MG/DL
NONHDLC SERPL-MCNC: 128 MG/DL
POTASSIUM SERPL-SCNC: 5.7 MMOL/L
PROT SERPL-MCNC: 7.5 G/DL
PROT UR-MCNC: 9 MG/DL
RHEUMATOID FACT SERPL-ACNC: <10 IU/ML
SODIUM SERPL-SCNC: 138 MMOL/L
TRIGL SERPL-MCNC: 143 MG/DL
TSH SERPL-ACNC: 0.57 UIU/ML
VIT B12 SERPL-MCNC: 524 PG/ML

## 2025-07-14 PROCEDURE — G2211 COMPLEX E/M VISIT ADD ON: CPT

## 2025-07-14 PROCEDURE — 99214 OFFICE O/P EST MOD 30 MIN: CPT

## 2025-07-15 LAB
APPEARANCE: CLEAR
BACTERIA: NEGATIVE /HPF
BASOPHILS # BLD AUTO: 0.06 K/UL
BASOPHILS NFR BLD AUTO: 0.8 %
BILIRUBIN URINE: NEGATIVE
BLOOD URINE: ABNORMAL
CAST: 1 /LPF
COLOR: YELLOW
EOSINOPHIL # BLD AUTO: 0.26 K/UL
EOSINOPHIL NFR BLD AUTO: 3.3 %
EPITHELIAL CELLS: 6 /HPF
ERYTHROCYTE [SEDIMENTATION RATE] IN BLOOD BY WESTERGREN METHOD: 35 MM/HR
ESTIMATED AVERAGE GLUCOSE: 128 MG/DL
GLUCOSE QUALITATIVE U: NEGATIVE MG/DL
HBA1C MFR BLD HPLC: 6.1 %
HCT VFR BLD CALC: 36.4 %
HGB BLD-MCNC: 11.5 G/DL
IMM GRANULOCYTES NFR BLD AUTO: 0.4 %
KETONES URINE: NEGATIVE MG/DL
LEUKOCYTE ESTERASE URINE: ABNORMAL
LYMPHOCYTES # BLD AUTO: 2.85 K/UL
LYMPHOCYTES NFR BLD AUTO: 35.8 %
MAN DIFF?: NORMAL
MCHC RBC-ENTMCNC: 29.5 PG
MCHC RBC-ENTMCNC: 31.6 G/DL
MCV RBC AUTO: 93.3 FL
MICROSCOPIC-UA: NORMAL
MONOCYTES # BLD AUTO: 0.81 K/UL
MONOCYTES NFR BLD AUTO: 10.2 %
NEUTROPHILS # BLD AUTO: 3.95 K/UL
NEUTROPHILS NFR BLD AUTO: 49.5 %
NITRITE URINE: NEGATIVE
PH URINE: 5.5
PLATELET # BLD AUTO: 290 K/UL
PROTEIN URINE: NEGATIVE MG/DL
RBC # BLD: 3.9 M/UL
RBC # FLD: 13.5 %
RED BLOOD CELLS URINE: 10 /HPF
SPECIFIC GRAVITY URINE: 1.02
UROBILINOGEN URINE: 0.2 MG/DL
WBC # FLD AUTO: 7.96 K/UL
WHITE BLOOD CELLS URINE: 18 /HPF

## 2025-07-16 ENCOUNTER — OUTPATIENT (OUTPATIENT)
Dept: OUTPATIENT SERVICES | Facility: HOSPITAL | Age: 75
LOS: 1 days | End: 2025-07-16
Payer: MEDICARE

## 2025-07-16 ENCOUNTER — APPOINTMENT (OUTPATIENT)
Dept: RADIOLOGY | Facility: CLINIC | Age: 75
End: 2025-07-16

## 2025-07-16 DIAGNOSIS — H26.9 UNSPECIFIED CATARACT: Chronic | ICD-10-CM

## 2025-07-16 DIAGNOSIS — M25.561 PAIN IN RIGHT KNEE: ICD-10-CM

## 2025-07-16 DIAGNOSIS — H02.209 UNSPECIFIED LAGOPHTHALMOS UNSPECIFIED EYE, UNSPECIFIED EYELID: Chronic | ICD-10-CM

## 2025-07-16 LAB
B2 GLYCOPROT1 IGA SERPL IA-ACNC: <2 U/ML
B2 GLYCOPROT1 IGG SERPL IA-ACNC: <1.4 U/ML
B2 GLYCOPROT1 IGM SERPL IA-ACNC: <1.5 U/ML
CARDIOLIPIN IGA SER IA-ACNC: <2 APL U/ML
CARDIOLIPIN IGG SER IA-ACNC: <1.6 GPL U/ML
CARDIOLIPIN IGM SER IA-ACNC: <1.5 MPL U/ML
CENTROMERE B AB SER-ACNC: <0.2 AL
DSDNA AB SER-ACNC: <1 IU/ML
ENA RNP AB SER-ACNC: 0.2 AL
ENA SCL70 AB SER-ACNC: <0.2 AL
ENA SM AB SER-ACNC: <0.2 AL
ENA SS-A AB SER-ACNC: <0.2 AL
ENA SS-B AB SER-ACNC: <0.2 AL
RNA POLYMERASE III IGG: 25 UNITS

## 2025-07-16 PROCEDURE — 73564 X-RAY EXAM KNEE 4 OR MORE: CPT | Mod: 26,50

## 2025-07-16 PROCEDURE — 73564 X-RAY EXAM KNEE 4 OR MORE: CPT

## 2025-07-18 LAB
ANA TITR SER: ABNORMAL
ANA TITR SER: ABNORMAL

## 2025-07-25 ENCOUNTER — RX RENEWAL (OUTPATIENT)
Age: 75
End: 2025-07-25

## 2025-08-08 ENCOUNTER — APPOINTMENT (OUTPATIENT)
Dept: RHEUMATOLOGY | Facility: CLINIC | Age: 75
End: 2025-08-08
Payer: MEDICARE

## 2025-08-08 DIAGNOSIS — M17.9 OSTEOARTHRITIS OF KNEE, UNSPECIFIED: ICD-10-CM

## 2025-08-08 DIAGNOSIS — R76.8 OTHER SPECIFIED ABNORMAL IMMUNOLOGICAL FINDINGS IN SERUM: ICD-10-CM

## 2025-08-08 PROCEDURE — 99212 OFFICE O/P EST SF 10 MIN: CPT | Mod: 2W

## 2025-08-11 LAB
ANION GAP SERPL CALC-SCNC: 13 MMOL/L
BUN SERPL-MCNC: 12 MG/DL
CALCIUM SERPL-MCNC: 9.1 MG/DL
CHLORIDE SERPL-SCNC: 101 MMOL/L
CO2 SERPL-SCNC: 24 MMOL/L
CREAT SERPL-MCNC: 0.69 MG/DL
EGFRCR SERPLBLD CKD-EPI 2021: 90 ML/MIN/1.73M2
POTASSIUM SERPL-SCNC: 4.8 MMOL/L
SODIUM SERPL-SCNC: 138 MMOL/L

## 2025-08-14 LAB — RNA POLYMERASE III IGG: 28 UNITS
